# Patient Record
Sex: FEMALE | Race: WHITE | Employment: OTHER | URBAN - METROPOLITAN AREA
[De-identification: names, ages, dates, MRNs, and addresses within clinical notes are randomized per-mention and may not be internally consistent; named-entity substitution may affect disease eponyms.]

---

## 2018-10-26 ENCOUNTER — APPOINTMENT (OUTPATIENT)
Dept: GENERAL RADIOLOGY | Age: 73
DRG: 439 | End: 2018-10-26
Attending: EMERGENCY MEDICINE
Payer: MEDICARE

## 2018-10-26 ENCOUNTER — APPOINTMENT (OUTPATIENT)
Dept: ULTRASOUND IMAGING | Age: 73
DRG: 439 | End: 2018-10-26
Attending: EMERGENCY MEDICINE
Payer: MEDICARE

## 2018-10-26 ENCOUNTER — APPOINTMENT (OUTPATIENT)
Dept: CT IMAGING | Age: 73
DRG: 439 | End: 2018-10-26
Attending: EMERGENCY MEDICINE
Payer: MEDICARE

## 2018-10-26 ENCOUNTER — HOSPITAL ENCOUNTER (INPATIENT)
Age: 73
LOS: 8 days | Discharge: HOME | DRG: 439 | End: 2018-11-03
Attending: EMERGENCY MEDICINE | Admitting: INTERNAL MEDICINE
Payer: MEDICARE

## 2018-10-26 DIAGNOSIS — K85.11 ACUTE BILIARY PANCREATITIS WITH UNINFECTED NECROSIS: ICD-10-CM

## 2018-10-26 DIAGNOSIS — K80.20 GALL STONES: ICD-10-CM

## 2018-10-26 DIAGNOSIS — K85.90 ACUTE PANCREATITIS, UNSPECIFIED COMPLICATION STATUS, UNSPECIFIED PANCREATITIS TYPE: Primary | ICD-10-CM

## 2018-10-26 LAB
ALBUMIN SERPL-MCNC: 3.8 G/DL (ref 3.5–5)
ALBUMIN/GLOB SERPL: 0.9 {RATIO} (ref 1.1–2.2)
ALP SERPL-CCNC: 132 U/L (ref 45–117)
ALT SERPL-CCNC: 63 U/L (ref 12–78)
ANION GAP SERPL CALC-SCNC: 7 MMOL/L (ref 5–15)
APPEARANCE UR: CLEAR
AST SERPL-CCNC: 100 U/L (ref 15–37)
ATRIAL RATE: 77 BPM
BACTERIA URNS QL MICRO: NEGATIVE /HPF
BASOPHILS # BLD: 0.1 K/UL (ref 0–0.1)
BASOPHILS NFR BLD: 0 % (ref 0–1)
BILIRUB SERPL-MCNC: 0.9 MG/DL (ref 0.2–1)
BILIRUB UR QL: NEGATIVE
BUN SERPL-MCNC: 22 MG/DL (ref 6–20)
BUN/CREAT SERPL: 30 (ref 12–20)
CALCIUM SERPL-MCNC: 9 MG/DL (ref 8.5–10.1)
CALCULATED P AXIS, ECG09: 34 DEGREES
CALCULATED R AXIS, ECG10: -27 DEGREES
CALCULATED T AXIS, ECG11: 9 DEGREES
CHLORIDE SERPL-SCNC: 105 MMOL/L (ref 97–108)
CO2 SERPL-SCNC: 26 MMOL/L (ref 21–32)
COLOR UR: ABNORMAL
CREAT SERPL-MCNC: 0.74 MG/DL (ref 0.55–1.02)
DIAGNOSIS, 93000: NORMAL
DIFFERENTIAL METHOD BLD: ABNORMAL
EOSINOPHIL # BLD: 0.1 K/UL (ref 0–0.4)
EOSINOPHIL NFR BLD: 0 % (ref 0–7)
EPITH CASTS URNS QL MICRO: ABNORMAL /LPF
ERYTHROCYTE [DISTWIDTH] IN BLOOD BY AUTOMATED COUNT: 13.1 % (ref 11.5–14.5)
GLOBULIN SER CALC-MCNC: 4.4 G/DL (ref 2–4)
GLUCOSE BLD STRIP.AUTO-MCNC: 210 MG/DL (ref 65–100)
GLUCOSE BLD STRIP.AUTO-MCNC: 217 MG/DL (ref 65–100)
GLUCOSE SERPL-MCNC: 185 MG/DL (ref 65–100)
GLUCOSE UR STRIP.AUTO-MCNC: NEGATIVE MG/DL
HCT VFR BLD AUTO: 48.8 % (ref 35–47)
HGB BLD-MCNC: 15.5 G/DL (ref 11.5–16)
HGB UR QL STRIP: NEGATIVE
HYALINE CASTS URNS QL MICRO: ABNORMAL /LPF (ref 0–5)
IMM GRANULOCYTES # BLD: 0.1 K/UL (ref 0–0.04)
IMM GRANULOCYTES NFR BLD AUTO: 1 % (ref 0–0.5)
KETONES UR QL STRIP.AUTO: NEGATIVE MG/DL
LACTATE SERPL-SCNC: 2.4 MMOL/L (ref 0.4–2)
LEUKOCYTE ESTERASE UR QL STRIP.AUTO: ABNORMAL
LIPASE SERPL-CCNC: >3000 U/L (ref 73–393)
LYMPHOCYTES # BLD: 2.8 K/UL (ref 0.8–3.5)
LYMPHOCYTES NFR BLD: 12 % (ref 12–49)
MCH RBC QN AUTO: 30.6 PG (ref 26–34)
MCHC RBC AUTO-ENTMCNC: 31.8 G/DL (ref 30–36.5)
MCV RBC AUTO: 96.4 FL (ref 80–99)
MONOCYTES # BLD: 1.7 K/UL (ref 0–1)
MONOCYTES NFR BLD: 7 % (ref 5–13)
NEUTS SEG # BLD: 18.8 K/UL (ref 1.8–8)
NEUTS SEG NFR BLD: 80 % (ref 32–75)
NITRITE UR QL STRIP.AUTO: NEGATIVE
NRBC # BLD: 0 K/UL (ref 0–0.01)
NRBC BLD-RTO: 0 PER 100 WBC
P-R INTERVAL, ECG05: 140 MS
PH UR STRIP: 5.5 [PH] (ref 5–8)
PLATELET # BLD AUTO: 449 K/UL (ref 150–400)
PMV BLD AUTO: 10.2 FL (ref 8.9–12.9)
POTASSIUM SERPL-SCNC: 4.2 MMOL/L (ref 3.5–5.1)
PROT SERPL-MCNC: 8.2 G/DL (ref 6.4–8.2)
PROT UR STRIP-MCNC: NEGATIVE MG/DL
Q-T INTERVAL, ECG07: 396 MS
QRS DURATION, ECG06: 66 MS
QTC CALCULATION (BEZET), ECG08: 448 MS
RBC # BLD AUTO: 5.06 M/UL (ref 3.8–5.2)
RBC #/AREA URNS HPF: ABNORMAL /HPF (ref 0–5)
SERVICE CMNT-IMP: ABNORMAL
SERVICE CMNT-IMP: ABNORMAL
SODIUM SERPL-SCNC: 138 MMOL/L (ref 136–145)
SP GR UR REFRACTOMETRY: 1.01 (ref 1–1.03)
TRIGL SERPL-MCNC: 101 MG/DL (ref ?–150)
TROPONIN I SERPL-MCNC: <0.05 NG/ML
UA: UC IF INDICATED,UAUC: ABNORMAL
UROBILINOGEN UR QL STRIP.AUTO: 0.2 EU/DL (ref 0.2–1)
VENTRICULAR RATE, ECG03: 77 BPM
WBC # BLD AUTO: 23.6 K/UL (ref 3.6–11)
WBC URNS QL MICRO: ABNORMAL /HPF (ref 0–4)

## 2018-10-26 PROCEDURE — 83690 ASSAY OF LIPASE: CPT | Performed by: EMERGENCY MEDICINE

## 2018-10-26 PROCEDURE — 84484 ASSAY OF TROPONIN QUANT: CPT | Performed by: EMERGENCY MEDICINE

## 2018-10-26 PROCEDURE — 81001 URINALYSIS AUTO W/SCOPE: CPT | Performed by: INTERNAL MEDICINE

## 2018-10-26 PROCEDURE — 96374 THER/PROPH/DIAG INJ IV PUSH: CPT

## 2018-10-26 PROCEDURE — 74011250636 HC RX REV CODE- 250/636: Performed by: EMERGENCY MEDICINE

## 2018-10-26 PROCEDURE — 99285 EMERGENCY DEPT VISIT HI MDM: CPT

## 2018-10-26 PROCEDURE — 84478 ASSAY OF TRIGLYCERIDES: CPT | Performed by: PHYSICIAN ASSISTANT

## 2018-10-26 PROCEDURE — 71045 X-RAY EXAM CHEST 1 VIEW: CPT

## 2018-10-26 PROCEDURE — 74011636637 HC RX REV CODE- 636/637: Performed by: INTERNAL MEDICINE

## 2018-10-26 PROCEDURE — 74011250636 HC RX REV CODE- 250/636: Performed by: PHYSICIAN ASSISTANT

## 2018-10-26 PROCEDURE — 74011000250 HC RX REV CODE- 250: Performed by: EMERGENCY MEDICINE

## 2018-10-26 PROCEDURE — 74011250636 HC RX REV CODE- 250/636: Performed by: FAMILY MEDICINE

## 2018-10-26 PROCEDURE — 87086 URINE CULTURE/COLONY COUNT: CPT | Performed by: INTERNAL MEDICINE

## 2018-10-26 PROCEDURE — 99222 1ST HOSP IP/OBS MODERATE 55: CPT | Performed by: FAMILY MEDICINE

## 2018-10-26 PROCEDURE — 93005 ELECTROCARDIOGRAM TRACING: CPT

## 2018-10-26 PROCEDURE — 36415 COLL VENOUS BLD VENIPUNCTURE: CPT | Performed by: EMERGENCY MEDICINE

## 2018-10-26 PROCEDURE — 76705 ECHO EXAM OF ABDOMEN: CPT

## 2018-10-26 PROCEDURE — 74011000258 HC RX REV CODE- 258: Performed by: FAMILY MEDICINE

## 2018-10-26 PROCEDURE — 74177 CT ABD & PELVIS W/CONTRAST: CPT

## 2018-10-26 PROCEDURE — 74011000258 HC RX REV CODE- 258: Performed by: INTERNAL MEDICINE

## 2018-10-26 PROCEDURE — 74011250636 HC RX REV CODE- 250/636: Performed by: INTERNAL MEDICINE

## 2018-10-26 PROCEDURE — 82962 GLUCOSE BLOOD TEST: CPT

## 2018-10-26 PROCEDURE — 83605 ASSAY OF LACTIC ACID: CPT | Performed by: EMERGENCY MEDICINE

## 2018-10-26 PROCEDURE — 74011000258 HC RX REV CODE- 258: Performed by: EMERGENCY MEDICINE

## 2018-10-26 PROCEDURE — 87040 BLOOD CULTURE FOR BACTERIA: CPT | Performed by: EMERGENCY MEDICINE

## 2018-10-26 PROCEDURE — 65270000029 HC RM PRIVATE

## 2018-10-26 PROCEDURE — 74011250637 HC RX REV CODE- 250/637: Performed by: EMERGENCY MEDICINE

## 2018-10-26 PROCEDURE — 85025 COMPLETE CBC W/AUTO DIFF WBC: CPT | Performed by: EMERGENCY MEDICINE

## 2018-10-26 PROCEDURE — 82787 IGG 1 2 3 OR 4 EACH: CPT | Performed by: PHYSICIAN ASSISTANT

## 2018-10-26 PROCEDURE — 74011250636 HC RX REV CODE- 250/636

## 2018-10-26 PROCEDURE — 80053 COMPREHEN METABOLIC PANEL: CPT | Performed by: EMERGENCY MEDICINE

## 2018-10-26 PROCEDURE — 96375 TX/PRO/DX INJ NEW DRUG ADDON: CPT

## 2018-10-26 PROCEDURE — 74011636320 HC RX REV CODE- 636/320: Performed by: EMERGENCY MEDICINE

## 2018-10-26 RX ORDER — ONDANSETRON 2 MG/ML
INJECTION INTRAMUSCULAR; INTRAVENOUS
Status: COMPLETED
Start: 2018-10-26 | End: 2018-10-26

## 2018-10-26 RX ORDER — SODIUM CHLORIDE, SODIUM LACTATE, POTASSIUM CHLORIDE, CALCIUM CHLORIDE 600; 310; 30; 20 MG/100ML; MG/100ML; MG/100ML; MG/100ML
150 INJECTION, SOLUTION INTRAVENOUS CONTINUOUS
Status: DISCONTINUED | OUTPATIENT
Start: 2018-10-26 | End: 2018-10-29

## 2018-10-26 RX ORDER — HYDROMORPHONE HYDROCHLORIDE 1 MG/ML
0.5 INJECTION, SOLUTION INTRAMUSCULAR; INTRAVENOUS; SUBCUTANEOUS
Status: DISCONTINUED | OUTPATIENT
Start: 2018-10-26 | End: 2018-11-03 | Stop reason: HOSPADM

## 2018-10-26 RX ORDER — DEXTROSE 50 % IN WATER (D50W) INTRAVENOUS SYRINGE
25-50 AS NEEDED
Status: DISCONTINUED | OUTPATIENT
Start: 2018-10-26 | End: 2018-11-03 | Stop reason: HOSPADM

## 2018-10-26 RX ORDER — ASPIRIN 81 MG/1
81 TABLET ORAL DAILY
COMMUNITY
End: 2020-06-12

## 2018-10-26 RX ORDER — MAGNESIUM SULFATE 100 %
4 CRYSTALS MISCELLANEOUS AS NEEDED
Status: DISCONTINUED | OUTPATIENT
Start: 2018-10-26 | End: 2018-11-03 | Stop reason: HOSPADM

## 2018-10-26 RX ORDER — SODIUM CHLORIDE 0.9 % (FLUSH) 0.9 %
5-10 SYRINGE (ML) INJECTION EVERY 8 HOURS
Status: DISCONTINUED | OUTPATIENT
Start: 2018-10-26 | End: 2018-11-03 | Stop reason: HOSPADM

## 2018-10-26 RX ORDER — SODIUM CHLORIDE 9 MG/ML
100 INJECTION, SOLUTION INTRAVENOUS CONTINUOUS
Status: DISCONTINUED | OUTPATIENT
Start: 2018-10-26 | End: 2018-10-26

## 2018-10-26 RX ORDER — HYDROMORPHONE HYDROCHLORIDE 1 MG/ML
0.5 INJECTION, SOLUTION INTRAMUSCULAR; INTRAVENOUS; SUBCUTANEOUS ONCE
Status: COMPLETED | OUTPATIENT
Start: 2018-10-26 | End: 2018-10-26

## 2018-10-26 RX ORDER — SODIUM CHLORIDE 0.9 % (FLUSH) 0.9 %
10 SYRINGE (ML) INJECTION
Status: COMPLETED | OUTPATIENT
Start: 2018-10-26 | End: 2018-10-26

## 2018-10-26 RX ORDER — FAMOTIDINE 10 MG/ML
20 INJECTION INTRAVENOUS
Status: COMPLETED | OUTPATIENT
Start: 2018-10-26 | End: 2018-10-26

## 2018-10-26 RX ORDER — ONDANSETRON 2 MG/ML
4 INJECTION INTRAMUSCULAR; INTRAVENOUS
Status: DISCONTINUED | OUTPATIENT
Start: 2018-10-26 | End: 2018-11-03 | Stop reason: HOSPADM

## 2018-10-26 RX ORDER — SODIUM CHLORIDE 9 MG/ML
50 INJECTION, SOLUTION INTRAVENOUS
Status: COMPLETED | OUTPATIENT
Start: 2018-10-26 | End: 2018-10-26

## 2018-10-26 RX ORDER — INSULIN LISPRO 100 [IU]/ML
INJECTION, SOLUTION INTRAVENOUS; SUBCUTANEOUS
Status: DISCONTINUED | OUTPATIENT
Start: 2018-10-26 | End: 2018-11-03 | Stop reason: HOSPADM

## 2018-10-26 RX ORDER — SODIUM CHLORIDE 9 MG/ML
100 INJECTION, SOLUTION INTRAVENOUS ONCE
Status: COMPLETED | OUTPATIENT
Start: 2018-10-26 | End: 2018-10-26

## 2018-10-26 RX ORDER — MORPHINE SULFATE 2 MG/ML
2 INJECTION, SOLUTION INTRAMUSCULAR; INTRAVENOUS ONCE
Status: COMPLETED | OUTPATIENT
Start: 2018-10-26 | End: 2018-10-26

## 2018-10-26 RX ORDER — MULTIVITAMIN WITH IRON
1 TABLET ORAL DAILY
COMMUNITY
End: 2020-06-02

## 2018-10-26 RX ORDER — ONDANSETRON 2 MG/ML
8 INJECTION INTRAMUSCULAR; INTRAVENOUS
Status: COMPLETED | OUTPATIENT
Start: 2018-10-26 | End: 2018-10-26

## 2018-10-26 RX ORDER — HEPARIN SODIUM 5000 [USP'U]/ML
5000 INJECTION, SOLUTION INTRAVENOUS; SUBCUTANEOUS EVERY 8 HOURS
Status: DISCONTINUED | OUTPATIENT
Start: 2018-10-26 | End: 2018-11-03 | Stop reason: HOSPADM

## 2018-10-26 RX ORDER — ONDANSETRON 2 MG/ML
6 INJECTION INTRAMUSCULAR; INTRAVENOUS
Status: COMPLETED | OUTPATIENT
Start: 2018-10-26 | End: 2018-10-26

## 2018-10-26 RX ORDER — SODIUM CHLORIDE 0.9 % (FLUSH) 0.9 %
5-10 SYRINGE (ML) INJECTION AS NEEDED
Status: DISCONTINUED | OUTPATIENT
Start: 2018-10-26 | End: 2018-11-03 | Stop reason: HOSPADM

## 2018-10-26 RX ADMIN — HEPARIN SODIUM 5000 UNITS: 5000 INJECTION INTRAVENOUS; SUBCUTANEOUS at 21:07

## 2018-10-26 RX ADMIN — LIDOCAINE HYDROCHLORIDE 40 ML: 20 SOLUTION ORAL; TOPICAL at 13:11

## 2018-10-26 RX ADMIN — Medication 10 ML: at 15:11

## 2018-10-26 RX ADMIN — INSULIN LISPRO 2 UNITS: 100 INJECTION, SOLUTION INTRAVENOUS; SUBCUTANEOUS at 21:07

## 2018-10-26 RX ADMIN — Medication 10 ML: at 17:55

## 2018-10-26 RX ADMIN — MORPHINE SULFATE 2 MG: 2 INJECTION, SOLUTION INTRAMUSCULAR; INTRAVENOUS at 13:35

## 2018-10-26 RX ADMIN — SODIUM CHLORIDE, SODIUM LACTATE, POTASSIUM CHLORIDE, AND CALCIUM CHLORIDE 250 ML/HR: 600; 310; 30; 20 INJECTION, SOLUTION INTRAVENOUS at 23:43

## 2018-10-26 RX ADMIN — FAMOTIDINE 20 MG: 10 INJECTION, SOLUTION INTRAVENOUS at 13:35

## 2018-10-26 RX ADMIN — SODIUM CHLORIDE, SODIUM LACTATE, POTASSIUM CHLORIDE, AND CALCIUM CHLORIDE 250 ML/HR: 600; 310; 30; 20 INJECTION, SOLUTION INTRAVENOUS at 19:03

## 2018-10-26 RX ADMIN — ONDANSETRON 6 MG: 2 INJECTION INTRAMUSCULAR; INTRAVENOUS at 13:35

## 2018-10-26 RX ADMIN — HYDROMORPHONE HYDROCHLORIDE 0.5 MG: 1 INJECTION, SOLUTION INTRAMUSCULAR; INTRAVENOUS; SUBCUTANEOUS at 21:20

## 2018-10-26 RX ADMIN — PROMETHAZINE HYDROCHLORIDE 12.5 MG: 25 INJECTION INTRAMUSCULAR; INTRAVENOUS at 19:03

## 2018-10-26 RX ADMIN — IOPAMIDOL 100 ML: 755 INJECTION, SOLUTION INTRAVENOUS at 14:25

## 2018-10-26 RX ADMIN — INSULIN LISPRO 3 UNITS: 100 INJECTION, SOLUTION INTRAVENOUS; SUBCUTANEOUS at 17:59

## 2018-10-26 RX ADMIN — PIPERACILLIN SODIUM,TAZOBACTAM SODIUM 4.5 G: 4; .5 INJECTION, POWDER, FOR SOLUTION INTRAVENOUS at 17:11

## 2018-10-26 RX ADMIN — SODIUM CHLORIDE 1000 ML: 900 INJECTION, SOLUTION INTRAVENOUS at 14:23

## 2018-10-26 RX ADMIN — MEROPENEM 1 G: 1 INJECTION, POWDER, FOR SOLUTION INTRAVENOUS at 21:04

## 2018-10-26 RX ADMIN — HYDROMORPHONE HYDROCHLORIDE 0.5 MG: 1 INJECTION, SOLUTION INTRAMUSCULAR; INTRAVENOUS; SUBCUTANEOUS at 15:20

## 2018-10-26 RX ADMIN — ONDANSETRON 8 MG: 2 INJECTION INTRAMUSCULAR; INTRAVENOUS at 16:58

## 2018-10-26 RX ADMIN — SODIUM CHLORIDE 50 ML/HR: 900 INJECTION, SOLUTION INTRAVENOUS at 15:11

## 2018-10-26 RX ADMIN — Medication 10 ML: at 21:05

## 2018-10-26 RX ADMIN — SODIUM CHLORIDE, SODIUM LACTATE, POTASSIUM CHLORIDE, AND CALCIUM CHLORIDE 1000 ML: 600; 310; 30; 20 INJECTION, SOLUTION INTRAVENOUS at 17:55

## 2018-10-26 RX ADMIN — SODIUM CHLORIDE 100 ML/HR: 900 INJECTION, SOLUTION INTRAVENOUS at 17:11

## 2018-10-26 RX ADMIN — HYDROMORPHONE HYDROCHLORIDE 0.5 MG: 1 INJECTION, SOLUTION INTRAMUSCULAR; INTRAVENOUS; SUBCUTANEOUS at 17:50

## 2018-10-26 NOTE — ED NOTES
Patient assisted to bedside commode at this time; urine sample obtained and sent to lab at this time. Patient returned to bed in a position of comfort; call bell within reach.

## 2018-10-26 NOTE — ED NOTES
TRANSFER - OUT REPORT: 
 
Verbal report given to Regla Sidhu RN(name) on Karissa Payan  being transferred to Gen Surg(unit) for routine progression of care Report consisted of patients Situation, Background, Assessment and  
Recommendations(SBAR). Information from the following report(s) SBAR, Kardex, ED Summary, MAR, Recent Results and Cardiac Rhythm NSR was reviewed with the receiving nurse. Lines:  
Peripheral IV 10/26/18 Right Hand (Active) Site Assessment Clean, dry, & intact 10/26/2018  1:31 PM  
Phlebitis Assessment 0 10/26/2018  1:31 PM  
Infiltration Assessment 0 10/26/2018  1:31 PM  
Dressing Status Clean, dry, & intact 10/26/2018  1:31 PM  
Dressing Type Transparent 10/26/2018  1:31 PM  
Hub Color/Line Status Blue;Capped;Flushed 10/26/2018  1:31 PM  
  
 
Opportunity for questions and clarification was provided. Patient transported with: 
 Tech and patient chart

## 2018-10-26 NOTE — ROUTINE PROCESS
TRANSFER - IN REPORT: 
 
Verbal report received from Jeremy Ferro, [facility] (name) on Cielo Dan  being received from ED (unit) for routine progression of care Report consisted of patients Situation, Background, Assessment and  
Recommendations(SBAR). Information from the following report(s) SBAR, Kardex, Intake/Output and MAR was reviewed with the receiving nurse. Opportunity for questions and clarification was provided. Assessment completed upon patients arrival to unit and care assumed.

## 2018-10-26 NOTE — CONSULTS
Gastroenterology Consult     Referring Physician: Dr. Ash Alexandre    Consult Date: 10/26/2018     Subjective:     Chief Complaint: abd pain, nausea, vomiting    History of Present Illness: Yamil Woods is a 68 y.o. female who is seen in consultation for acute pancreatitis. Patient developed upper abd pain at 10am about an hour after breakfast this am (eggs and toast). The pain is severe, 10/10 and constant. There is no radiation to the back. There has been nausea and numerous episodes of vomiting (non bloody). Lipase>3000. CT abd/pelvis with contrast showed:   IMPRESSION:  1. Acute pancreatitis. Possible developing necrosis in the tail. No drainable  fluid collection at this time. 2. Cholelithiasis. Decreased gallstones since 2010. CBD is not dilated. 3. Colonic diverticulosis. No diverticulitis. U/S Abd showed  FINDINGS:      Liver: echogenicity is within normal limits. No focal liver lesion.      Main portal vein flow: Toward the liver.     Fluid: No ascites.     Gallbladder: Contains a mobile shadowing gallstones. One of the gallstones  measures 2.4 cm and is lodged in the gallbladder neck. No gallbladder wall  thickening or pericholecystic fluid. Positive tenderness during scanning.     Bile ducts: There is no intra or extrahepatic biliary ductal dilatation. The  common bile duct measures 4 mm.     Pancreas: 2 mm pancreatic duct is within normal limits. No visible pancreatic  Fluid.      Kidneys: Right length: 10.8 cm. No hydronephrosis.     IMPRESSION  IMPRESSION:   1. Cholelithiasis. No acute cholecystitis. Normal CBD. 2.  Normal pancreas on ultrasound. T. Bili 0.9  Alk Phos 132    ALT 63    WBC 23.6  BUN/Crt normal  Hct 48    No hx of pancreatitis or sxs of gallstones. No ETOH. No new medications. Mother had her gallbladder removed. No FH of pancreatitis. Non smoker. Did intentionally lose 23 lbs with the help of weight watchers this year (since Jan).     Past Medical History:   Diagnosis Date    Arthritis     osteoarthritis    Chronic allergic rhinitis     Chronic pain     right knee    Diabetes (HCC)     diet controlled    GERD (gastroesophageal reflux disease)     Hypercholesterolemia     Hypertension      Past Surgical History:   Procedure Laterality Date    HX ORTHOPAEDIC  9/18/13    RIGHT TOTAL KNEE ARTHROPLASTY    HX AISSATOU AND BSO      HX TUBAL LIGATION        History reviewed. No pertinent family history. Social History     Tobacco Use    Smoking status: Never Smoker    Smokeless tobacco: Never Used   Substance Use Topics    Alcohol use: No      No Known Allergies  Current Facility-Administered Medications   Medication Dose Route Frequency    0.9% sodium chloride infusion  100 mL/hr IntraVENous ONCE    piperacillin-tazobactam (ZOSYN) 3.375 g in 0.9% sodium chloride (MBP/ADV) 100 mL  3.375 g IntraVENous Q8H    ondansetron (ZOFRAN) injection 8 mg  8 mg IntraVENous NOW    glucose chewable tablet 16 g  4 Tab Oral PRN    dextrose (D50W) injection syrg 12.5-25 g  25-50 mL IntraVENous PRN    glucagon (GLUCAGEN) injection 1 mg  1 mg IntraMUSCular PRN    insulin lispro (HUMALOG) injection   SubCUTAneous AC&HS    sodium chloride (NS) flush 5-10 mL  5-10 mL IntraVENous Q8H    sodium chloride (NS) flush 5-10 mL  5-10 mL IntraVENous PRN    0.9% sodium chloride infusion  100 mL/hr IntraVENous CONTINUOUS    HYDROmorphone (PF) (DILAUDID) injection 0.5 mg  0.5 mg IntraVENous Q4H PRN    heparin (porcine) injection 5,000 Units  5,000 Units SubCUTAneous Q8H    ondansetron (ZOFRAN) injection 4 mg  4 mg IntraVENous Q4H PRN    acetaminophen (TYLENOL) solution 650 mg  650 mg Oral Q6H PRN     Current Outpatient Medications   Medication Sig    aspirin delayed-release 81 mg tablet Take 81 mg by mouth daily.  multivitamin with iron (DAILY MULTI-VITAMINS/IRON) tablet Take 1 Tab by mouth daily.  amLODIPine (NORVASC) 5 mg tablet Take 5 mg by mouth daily.  Indications: HYPERTENSION    omeprazole (PRILOSEC) 20 mg capsule Take 20 mg by mouth daily. Indications: GASTROESOPHAGEAL REFLUX    potassium chloride SR (KLOR-CON 10) 10 mEq tablet Take 20 mEq by mouth two (2) times a day.  lisinopril (PRINIVIL, ZESTRIL) 40 mg tablet Take 40 mg by mouth daily. Indications: HYPERTENSION    atorvastatin (LIPITOR) 20 mg tablet Take 20 mg by mouth nightly. Indications: HYPERCHOLESTEROLEMIA    BUMETANIDE (BUMEX PO) Take 1 mg by mouth daily.  estradiol (ESTRACE) 0.5 mg tablet Take 0.5 mg by mouth every other day.  cycloSPORINE (RESTASIS) 0.05 % ophthalmic emulsion Administer 1 Drop to left eye two (2) times a day. Indications: DRY EYE        Review of Systems:  A detailed 10 organ review of systems is obtained with pertinent positives as listed in the History of Present Illness and Past Medical History. A detailed review of systems was performed as follows:  Constitutional:  Sweats/chills  Eyes:  No ocular sensitivity to the sun, blurred vision or double vision. ENMT:  No nose or sinus problems. Respiratory: No coughing, wheezing or sob  Cardiac:  No chest pain, exertional chest pain or palpitations  Gastrointestinal:  See history of the present illness  :   No pain with urination or hematuria  Musculoskeletal:  No arthritis or hot swollen joints. Endocrine:  No thyroid disease or diabetes  Psychiatric: No depression or feeling blue  Integumentary:  No skin rash or sensitivity to the sun. Neurologic:  No stroke or seizure; no numbness or tingling of the extremities. Heme-Lymphatic:  No history of anemia, no unexplained lumps or bumps      Objective:     Physical Exam:  Visit Vitals  /57   Pulse 64   Temp 97.3 °F (36.3 °C)   Resp 28   Ht 5' 2\" (1.575 m)   Wt 64.8 kg (142 lb 13.7 oz)   SpO2 93%   BMI 26.13 kg/m²        Gen: Ill appearing but NAD  Skin:  Extremities and face reveal no rashes. Judson Pérez HEENT: Sclerae anicteric. No abnormal pigmentation of the lips. Cardiovascular: Regular rate and rhythm. No murmurs, gallops, or rubs. Respiratory:  Comfortable breathing with no accessory muscle use. Clear breath sounds with no wheezes, rales, or rhonchi. GI:  Abdomen nondistended Normal active bowel sounds. Tenderness across epigastrium. Palpation in RUQ induces referred pain in LUQ. No enlargement of the liver or spleen. No masses palpable. Rectal:  Deferred  Musculoskeletal:  No pitting edema of the lower legs. Neurological:  Gross memory appears intact. Patient is alert and oriented. Psychiatric:  Mood appears appropriate with judgement intact. Lymphatic:  No cervical or supraclavicular adenopathy. Lab/Data Review:  CT Results (most recent):  Results from Hospital Encounter encounter on 10/26/18   CT ABD PELV W CONT    Narrative EXAM:  CT ABD PELV W CONT    INDICATION: Epigastric abdominal pain, leukocytosis, elevated lipase, alkaline  phosphatase, and AST. Cholelithiasis. Hysterectomy and oophorectomy. COMPARISON: Limited abdominal ultrasound earlier this afternoon. CT  abdomen/pelvis without IV contrast on 1/4/2010. CONTRAST:  100 mL of Isovue-370. TECHNIQUE:   Following the uneventful intravenous administration of contrast, thin axial  images were obtained through the abdomen and pelvis. Coronal and sagittal  reconstructions were generated. Oral contrast was not administered. CT dose  reduction was achieved through use of a standardized protocol tailored for this  examination and automatic exposure control for dose modulation. FINDINGS:   LUNG BASES: Mild dependent atelectasis. No pneumonia. INCIDENTALLY IMAGED HEART AND MEDIASTINUM: Unremarkable. LIVER: No mass or biliary dilatation. GALLBLADDER: Cholelithiasis is decreased in number since 2010. No acute  cholecystitis. CBD is not dilated. SPLEEN: Normal size and enhancement. PANCREAS: Hypoenhancement of the tail. No mass. Extensive surrounding edema.  No  drainable fluid collection or pseudocyst. Pancreatic duct is not dilated. ADRENALS: Unremarkable. KIDNEYS: No mass, calculus, or hydronephrosis. STOMACH: Unremarkable. SMALL BOWEL: No dilatation or wall thickening. COLON: Moderate colonic diverticulosis. No diverticulitis. APPENDIX: Unremarkable. PERITONEUM: No ascites or pneumoperitoneum. RETROPERITONEUM: Edema from pancreatitis. Aorta is normal in caliber. Aortic  branches are patent. No lymphadenopathy. Mesenteric vasculature is patent. REPRODUCTIVE ORGANS: Hysterectomy and oophorectomy. URINARY BLADDER: No mass or calculus. BONES: No destructive bone lesion. ADDITIONAL COMMENTS: N/A      Impression IMPRESSION:    1. Acute pancreatitis. Possible developing necrosis in the tail. No drainable  fluid collection at this time. 2. Cholelithiasis. Decreased gallstones since 2010. CBD is not dilated. 3. Colonic diverticulosis. No diverticulitis. US Results (most recent):  Results from [place] encounter on 10/26/18   US ABD LTD    Narrative EXAM:  US ABD LTD    INDICATION: Sudden onset mid lower abdominal pain at 10:00 AM today. Leukocytosis. Elevated lipase, alkaline phosphatase, and AST. Normal serum  bilirubin. COMPARISON:  None    TECHNIQUE:  Limited abdominal ultrasound. FINDINGS:     Liver: echogenicity is within normal limits. No focal liver lesion. Main portal vein flow: Toward the liver. Fluid: No ascites. Gallbladder: Contains a mobile shadowing gallstones. One of the gallstones  measures 2.4 cm and is lodged in the gallbladder neck. No gallbladder wall  thickening or pericholecystic fluid. Positive tenderness during scanning. Bile ducts: There is no intra or extrahepatic biliary ductal dilatation. The  common bile duct measures 4 mm. Pancreas: 2 mm pancreatic duct is within normal limits. No visible pancreatic  Fluid. Kidneys: Right length: 10.8 cm. No hydronephrosis. Impression IMPRESSION:   1. Cholelithiasis.  No acute cholecystitis. Normal CBD. 2.  Normal pancreas on ultrasound. Lab Results   Component Value Date/Time    WBC 23.6 (H) 10/26/2018 01:10 PM    HGB 15.5 10/26/2018 01:10 PM    HCT 48.8 (H) 10/26/2018 01:10 PM    PLATELET 438 (H) 13/02/8276 01:10 PM    MCV 96.4 10/26/2018 01:10 PM     Lab Results   Component Value Date/Time    Sodium 138 10/26/2018 01:10 PM    Potassium 4.2 10/26/2018 01:10 PM    Chloride 105 10/26/2018 01:10 PM    CO2 26 10/26/2018 01:10 PM    Anion gap 7 10/26/2018 01:10 PM    Glucose 185 (H) 10/26/2018 01:10 PM    BUN 22 (H) 10/26/2018 01:10 PM    Creatinine 0.74 10/26/2018 01:10 PM    BUN/Creatinine ratio 30 (H) 10/26/2018 01:10 PM    GFR est AA >60 10/26/2018 01:10 PM    GFR est non-AA >60 10/26/2018 01:10 PM    Calcium 9.0 10/26/2018 01:10 PM    Bilirubin, total 0.9 10/26/2018 01:10 PM    AST (SGOT) 100 (H) 10/26/2018 01:10 PM    Alk. phosphatase 132 (H) 10/26/2018 01:10 PM    Protein, total 8.2 10/26/2018 01:10 PM    Albumin 3.8 10/26/2018 01:10 PM    Globulin 4.4 (H) 10/26/2018 01:10 PM    A-G Ratio 0.9 (L) 10/26/2018 01:10 PM    ALT (SGPT) 63 10/26/2018 01:10 PM         Assessment/Plan:     Active Problems:    Gall stones (10/26/2018)      Pancreatitis (10/26/2018)         Patient presents with her first episode of acute gallstone pancreatitis. There is no evidence of a CBD stone and therefore it does not appear that ERCP is necessary. General surgery consult has been placed. She appears to have severe pancreatitis with WBC 23.6 and developing necrosis in the tail of the pancreas. I recommend aggressive hydration with LR (4-6 L) and supportive care with anti emetics and pain medication and NPO overnight. I warned the patient and her  that severe pancreatitis can progress to multisystem organ failure and can be life threatening in some people. YANNA Ontiveros  10/26/18  5:04 PM      The patient was seen and examined independently.  Please see above note for details. Physical exam:  General:AAO x 3, vomited during interview.  is at bedside  HEENT:  EOMI,   Chest:  CTA,Heart: S1, S2, RRR  GI: Soft, epigastric and RUQ pain with referred pain to LUQ. Dec bowel sounds  Extremities: No edema    Data Review:  CT reviewed with radiology    Impression:   Severe  acute pancreatitis  Possible early pancreatic necrosis  Leucocytosis  Nausea,vomiting and pain 2ndary to above  Gallstones    Plan and discussion:  · CT review showed ti pancreatic edema with possible early tail necrosis. GB has one large neck  stone. On last CT there were more stones suggesting that she has been passing them. This is likely what may have caused this. LFTs look ok. No CBD pathology. · Treat aggressively with IVF (LR) 3-4 litres today, then 250 cc /hour,  · IV analgesia,  · IV PPI  · IV antiemetics,  · Surgical consult. · NPO for now. Re asses daily. · Follow up CT depending on how this progresses. · Prognosis d/w pt and  in detail. · I have discussed the serious nature of pancreatitis with the family. The discussion included potential infection, respiratory failure, renal failure, ICU stay and multi-organ failure. · Get IgG 4 and TG levels. Signed By: Amina Man.  Yoon Courtney MD    10/26/2018  5:24 PM

## 2018-10-26 NOTE — ED NOTES
Patient assisted to bedside commode at this time. Patient returned to bed in a position of comfort; call bell within reach.

## 2018-10-26 NOTE — CONSULTS
Surgery      CT scan: \"IMPRESSION:     1. Acute pancreatitis. Possible developing necrosis in the tail. No drainable  fluid collection at this time. 2. Cholelithiasis. Decreased gallstones since 2010. CBD is not dilated. I    US:\"Contains a mobile shadowing gallstones. One of the gallstones  measures 2.4 cm and is lodged in the gallbladder neck. No gallbladder wall  thickening or pericholecystic fluid. Lipase>3000  Bili 0.9  SGPT  63  SGOT  100  Alk phos   132    Ca 9.0    WBC 23.6    H/H 15.5/48.8    Pt reports developing pain this AM.    PE tender across upper abdomen, greater to left side    Acute pancreatitis with possible necrosis  Unclear etiology for pancreatitis but may be gallstones. Would not entertain Lap Jessa at least until lipase is normaliized but due to CT scan findings and severity of pancreatitis might opt to wait six weeks. Suyggest repeat CT scan in 3-4 days unless conditiopn worsens. Discussed antibx coverage for acute pancreatitis and only study I am aware of which showed benefit was use of a carbapenem.     Discussed with Hospitalist    Will follow with you    Kumar Neil MD, [facility]

## 2018-10-26 NOTE — ED NOTES
GI MD at bedside to assess patient and discuss plan of care at this time. Per Dr. Lester Blas should consult general surgery if needed stat.

## 2018-10-26 NOTE — H&P
Hospitalist Admission NoteNAME: Raymond Garcia :  1945 MRN:  [id number] Date/Time:  10/26/2018 3:38 PM 
 
Patient PCP: Kenyon Jackson MD 
______________________________________________________________________ Given the patient's current clinical presentation, I have a high level of concern for decompensation if discharged from the emergency department. Complex decision making was performed, which includes reviewing the patient's available past medical records, laboratory results, and x-ray films. My assessment of this patient's clinical condition and my plan of care is as follows. Assessment / Plan: 
 
Acute pancreatitis  POA Leukocytosis POA with SIRS criteria Blood cx/check lactic acid Cont zosyn Gi/surgery to evl Epigastric pain radiating to back, lipase elevated Suspect related gall stones not on any culprit meds Check lipid panel NPO Aggressive IVF 
PRN pain and nausea meds, monitor for side effects with the narcotics Check CT scan /us 1. Acute pancreatitis. Possible developing necrosis in the tail. No drainable 
fluid collection at this time. 2. Cholelithiasis. Decreased gallstones since . CBD is not dilated. 3. Colonic diverticulosis. No diverticulitis. [place] Check a1c HTN POA hold bp meds/bumex/asa Pain control PRN hydralazine HLP- hold meds ? DVT prophylaxis: Heparin Code status: Full code Surrogate Decision Maker:  GI Prophylaxis: pepcic Baseline:ambulatory Subjective: CHIEF COMPLAINT: abdominal pain HISTORY OF PRESENT ILLNESS:    
Mary Ralph is a 68 y.o.  female with PMHx significant for HTN, GERD,pre diabetes, hypercholesterolemia, and arthritis, presents to the er  C/o  new onset, constant, moderate, sharp, non-radiating epigastric and RUQ pain starting at 1000 today alongside associated nausea with vomiting, chills, and diaphoresis. No blood in vomitusThe pt states that she was able to normally eat breakfast this morning and dinner last night. She reports that she still has her gallbladder. She denies being in the proximity of sick individuals recently. She specifically denies experiencing hematemesis, SOB, CP, diarrhea, or fever. No h/o gall bladder issues. We were asked to admit for work up and evaluation of the above problems. Past Medical History:  
Diagnosis Date  Arthritis   
 osteoarthritis  Chronic allergic rhinitis  Chronic pain   
 right knee  Diabetes ([address] diet controlled  GERD (gastroesophageal reflux disease)  Hypercholesterolemia  Hypertension Past Surgical History:  
Procedure Laterality Date  HX ORTHOPAEDIC  9/18/13 RIGHT TOTAL KNEE ARTHROPLASTY  HX AISSATOU AND BSO  HX TUBAL LIGATION Social History Tobacco Use  Smoking status: Never Smoker  Smokeless tobacco: Never Used Substance Use Topics  Alcohol use: No  
  
 
History reviewed. No pertinent family history. No Known Allergies Prior to Admission medications Medication Sig Start Date End Date Taking? Authorizing Provider  
aspirin delayed-release 81 mg tablet Take 81 mg by mouth daily. Yes Other, MD Radha  
multivitamin with iron (DAILY MULTI-VITAMINS/IRON) tablet Take 1 Tab by mouth daily. Yes Other, MD Radha  
amLODIPine (NORVASC) 5 mg tablet Take 5 mg by mouth daily. Indications: HYPERTENSION   Yes Provider, Historical  
omeprazole (PRILOSEC) 20 mg capsule Take 20 mg by mouth daily. Indications: GASTROESOPHAGEAL REFLUX   Yes Provider, Historical  
potassium chloride SR (KLOR-CON 10) 10 mEq tablet Take 20 mEq by mouth two (2) times a day. Yes Provider, Historical  
lisinopril (PRINIVIL, ZESTRIL) 40 mg tablet Take 40 mg by mouth daily. Indications: HYPERTENSION   Yes Provider, Historical  
atorvastatin (LIPITOR) 20 mg tablet Take 20 mg by mouth nightly. Indications: HYPERCHOLESTEROLEMIA   Yes Provider, Historical  
BUMETANIDE (BUMEX PO) Take 1 mg by mouth daily. Yes Provider, Historical  
estradiol (ESTRACE) 0.5 mg tablet Take 0.5 mg by mouth every other day. Provider, Historical  
cycloSPORINE (RESTASIS) 0.05 % ophthalmic emulsion Administer 1 Drop to left eye two (2) times a day. Indications: DRY EYE    Provider, Historical  
 
 
REVIEW OF SYSTEMS:    
I am not able to complete the review of systems because: The patient is intubated and sedated The patient has altered mental status due to his acute medical problems The patient has baseline aphasia from prior stroke(s) The patient has baseline dementia and is not reliable historian The patient is in acute medical distress and unable to provide information Total of 12 systems reviewed as follows:   
   POSITIVE= underlined text  Negative = text not underlined General:  fever, chills, sweats, generalized weakness, weight loss/gain,  
   loss of appetite Eyes:    blurred vision, eye pain, loss of vision, double vision ENT:    rhinorrhea, pharyngitis Respiratory:   cough, sputum production, SOB, KLEIN, wheezing, pleuritic pain  
Cardiology:   chest pain, palpitations, orthopnea, PND, edema, syncope Gastrointestinal:  abdominal pain , N/V, diarrhea, dysphagia, constipation, bleeding rt uq pain Genitourinary:  frequency, urgency, dysuria, hematuria, incontinence no hematemisis Muskuloskeletal :  arthralgia, myalgia, back pain Hematology:  easy bruising, nose or gum bleeding, lymphadenopathy Dermatological: rash, ulceration, pruritis, color change / jaundice Endocrine:   hot flashes or polydipsia Neurological:  headache, dizziness, confusion, focal weakness, paresthesia, Speech difficulties, memory loss, gait difficulty Psychological: Feelings of anxiety, depression, agitation Objective: VITALS:   
Visit Vitals /63 Pulse (!) 52 Temp 97.3 °F (36.3 °C) Resp 21 Ht 5' 2\" (1.575 m) Wt 64.8 kg (142 lb 13.7 oz) SpO2 98% BMI 26.13 kg/m² PHYSICAL EXAM: 
 
General:    Alert, cooperative, no distress, appears stated age. Bit distress dt n/v 
HEENT: Atraumatic, anicteric sclerae, pink conjunctivae No oral ulcers, mucosa moist, throat clear, dentition fair Neck:  Supple, symmetrical,  thyroid: non tender Lungs:   Clear to auscultation bilaterally. No Wheezing or Rhonchi. No rales. Chest wall:  No tenderness  No Accessory muscle use. Heart:   Regular  rhythm,  No  murmur   No edema Abdomen:   Soft, + tender rt uq . Not distended. No rt no guarding,  Bowel sounds normal 
Extremities: No cyanosis. No clubbing,   
  Skin turgor normal, Capillary refill normal, Radial dial pulse 2+ Skin:     Not pale. Not Jaundiced  No rashes Psych:  Good insight. Not depressed. Not anxious or agitated. Neurologic: EOMs intact. No facial asymmetry. No aphasia or slurred speech. Symmetrical strength, Sensation grossly intact. Alert and oriented X 4.  
 
_______________________________________________________________________ Care Plan discussed with: 
  Comments Patient x Family  x   
RN Care Manager Consultant:  x   
_______________________________________________________________________ Expected  Disposition:  
Home with Family x HH/PT/OT/RN   
SNF/LTC   
SAE   
________________________________________________________________________ TOTAL TIME:  90 Minutes Critical Care Provided     Minutes non procedure based Comments  
 x Reviewed previous records  
>50% of visit spent in counseling and coordination of care x Discussion with patient and/or family and questions answered 
  
 
________________________________________________________________________ Signed: Cristian Guillermo MD 
 
Procedures: see electronic medical records for all procedures/Xrays and details which were not copied into this note but were reviewed prior to creation of Plan. LAB DATA REVIEWED:   
Recent Results (from the past 24 hour(s)) EKG, 12 LEAD, INITIAL Collection Time: 10/26/18 12:19 PM  
Result Value Ref Range Ventricular Rate 77 BPM  
 Atrial Rate 77 BPM  
 P-R Interval 140 ms QRS Duration 66 ms  
 Q-T Interval 396 ms QTC Calculation (Bezet) 448 ms Calculated P Axis 34 degrees Calculated R Axis -27 degrees Calculated T Axis 9 degrees Diagnosis Normal sinus rhythm Inferior infarct , age undetermined Cannot rule out Anterior infarct , age undetermined No previous ECGs available CBC WITH AUTOMATED DIFF Collection Time: 10/26/18  1:10 PM  
Result Value Ref Range WBC 23.6 (H) 3.6 - 11.0 K/uL  
 RBC 5.06 3.80 - 5.20 M/uL  
 HGB 15.5 11.5 - 16.0 g/dL HCT 48.8 (H) 35.0 - 47.0 % MCV 96.4 80.0 - 99.0 FL  
 MCH 30.6 26.0 - 34.0 PG  
 MCHC 31.8 30.0 - 36.5 g/dL  
 RDW 13.1 11.5 - 14.5 % PLATELET 742 (H) 309 - 400 K/uL MPV 10.2 8.9 - 12.9 FL  
 NRBC 0.0 0  WBC ABSOLUTE NRBC 0.00 0.00 - 0.01 K/uL NEUTROPHILS 80 (H) 32 - 75 % LYMPHOCYTES 12 12 - 49 % MONOCYTES 7 5 - 13 % EOSINOPHILS 0 0 - 7 % BASOPHILS 0 0 - 1 % IMMATURE GRANULOCYTES 1 (H) 0.0 - 0.5 % ABS. NEUTROPHILS 18.8 (H) 1.8 - 8.0 K/UL  
 ABS. LYMPHOCYTES 2.8 0.8 - 3.5 K/UL  
 ABS. MONOCYTES 1.7 (H) 0.0 - 1.0 K/UL  
 ABS. EOSINOPHILS 0.1 0.0 - 0.4 K/UL  
 ABS. BASOPHILS 0.1 0.0 - 0.1 K/UL  
 ABS. IMM. GRANS. 0.1 (H) 0.00 - 0.04 K/UL  
 DF AUTOMATED METABOLIC PANEL, COMPREHENSIVE Collection Time: 10/26/18  1:10 PM  
Result Value Ref Range Sodium 138 136 - 145 mmol/L Potassium 4.2 3.5 - 5.1 mmol/L Chloride 105 97 - 108 mmol/L  
 CO2 26 21 - 32 mmol/L Anion gap 7 5 - 15 mmol/L Glucose 185 (H) 65 - 100 mg/dL BUN 22 (H) 6 - 20 MG/DL  Creatinine 0.74 0.55 - 1.02 MG/DL  
 BUN/Creatinine ratio 30 (H) 12 - 20    
 GFR est AA >60 >60 ml/min/1.73m2 GFR est non-AA >60 >60 ml/min/1.73m2 Calcium 9.0 8.5 - 10.1 MG/DL Bilirubin, total 0.9 0.2 - 1.0 MG/DL  
 ALT (SGPT) 63 12 - 78 U/L  
 AST (SGOT) 100 (H) 15 - 37 U/L Alk. phosphatase 132 (H) 45 - 117 U/L Protein, total 8.2 6.4 - 8.2 g/dL Albumin 3.8 3.5 - 5.0 g/dL Globulin 4.4 (H) 2.0 - 4.0 g/dL A-G Ratio 0.9 (L) 1.1 - 2.2 LIPASE Collection Time: 10/26/18  1:10 PM  
Result Value Ref Range Lipase >3,000 (H) 73 - 393 U/L  
TROPONIN I Collection Time: 10/26/18  1:10 PM  
Result Value Ref Range Troponin-I, Qt. <0.05 <0.05 ng/mL

## 2018-10-26 NOTE — ED PROVIDER NOTES
EMERGENCY DEPARTMENT HISTORY AND PHYSICAL EXAM 
 
 
Date: 10/26/2018 Patient Name: Risa Barlow History of Presenting Illness Chief Complaint Patient presents with  Abdominal Pain  
  middle lower abdomen onset this morning at 1000am sudden onset. History Provided By: Patient HPI: Risa Barlow, 68 y.o. female with PMHx significant for HTN, GERD, diabetes, hypercholesterolemia, and arthritis, presents ambulatory to the ED with cc of new onset, constant, moderate, sharp, non-radiating epigastric and RUQ pain starting at 1000 today alongside associated nausea with vomiting, chills, and diaphoresis. The pt states that she was able to normally eat breakfast this morning and dinner last night. She reports that she still has her gallbladder. She denies being in the proximity of sick individuals recently. She specifically denies experiencing hematemesis, SOB, CP, diarrhea, or fever. PMHx: HTN, GERD, diabetes, hypercholesterolemia, and arthritis PSHx: hysterectomy, tubal ligation SHx: - EtOH, - tobacco, - illicit drugs There are no other complaints, changes, or physical findings at this time. PCP: Vaishnavi Cleaning MD 
 
Current Facility-Administered Medications Medication Dose Route Frequency Provider Last Rate Last Dose  
 HYDROmorphone (PF) (DILAUDID) injection 0.5 mg  0.5 mg IntraVENous ONCE Anirudh Arauz MD      
 0.9% sodium chloride infusion  50 mL/hr IntraVENous RAD ONCE Anirudh Arauz MD      
 sodium chloride (NS) flush 10 mL  10 mL IntraVENous RAD ONCE Anirudh Arauz MD      
 iopamidol (ISOVUE-370) 76 % injection 100 mL  100 mL IntraVENous RAD ONCE Anirudh Arauz MD      
 
Current Outpatient Medications Medication Sig Dispense Refill  aspirin delayed-release 81 mg tablet Take 81 mg by mouth daily.  multivitamin with iron (DAILY MULTI-VITAMINS/IRON) tablet Take 1 Tab by mouth daily.  amLODIPine (NORVASC) 5 mg tablet Take 5 mg by mouth daily. Indications: HYPERTENSION    
 omeprazole (PRILOSEC) 20 mg capsule Take 20 mg by mouth daily. Indications: GASTROESOPHAGEAL REFLUX  potassium chloride SR (KLOR-CON 10) 10 mEq tablet Take 20 mEq by mouth two (2) times a day.  lisinopril (PRINIVIL, ZESTRIL) 40 mg tablet Take 40 mg by mouth daily. Indications: HYPERTENSION    
 atorvastatin (LIPITOR) 20 mg tablet Take 20 mg by mouth nightly. Indications: HYPERCHOLESTEROLEMIA  BUMETANIDE (BUMEX PO) Take 1 mg by mouth daily.  estradiol (ESTRACE) 0.5 mg tablet Take 0.5 mg by mouth every other day.  cycloSPORINE (RESTASIS) 0.05 % ophthalmic emulsion Administer 1 Drop to left eye two (2) times a day. Indications: DRY EYE Past History Past Medical History: 
Past Medical History:  
Diagnosis Date  Arthritis   
 osteoarthritis  Chronic allergic rhinitis  Chronic pain   
 right knee  Diabetes ([address] diet controlled  GERD (gastroesophageal reflux disease)  Hypercholesterolemia  Hypertension Past Surgical History: 
Past Surgical History:  
Procedure Laterality Date  HX ORTHOPAEDIC  9/18/13 RIGHT TOTAL KNEE ARTHROPLASTY  HX AISSATOU AND BSO  HX TUBAL LIGATION Family History: 
History reviewed. No pertinent family history. Social History: 
Social History Tobacco Use  Smoking status: Never Smoker  Smokeless tobacco: Never Used Substance Use Topics  Alcohol use: No  
 Drug use: No  
 
 
Allergies: 
No Known Allergies Review of Systems Review of Systems Constitutional: Positive for chills and diaphoresis. Negative for activity change, appetite change, fever and unexpected weight change. HENT: Negative for congestion. Eyes: Negative for pain and visual disturbance. Respiratory: Negative for cough and shortness of breath. Cardiovascular: Negative for chest pain. Gastrointestinal: Positive for abdominal pain (epigastric and RUQ pain), nausea and vomiting. Negative for diarrhea.  
     -hematemesis Genitourinary: Negative for dysuria. Musculoskeletal: Negative for back pain. Skin: Negative for rash. Neurological: Negative for headaches. Physical Exam  
Physical Exam  
Constitutional: She is oriented to person, place, and time. She appears well-developed and well-nourished. This is an elderly female in moderate distress due to pain HENT:  
Head: Normocephalic and atraumatic. Mouth/Throat: Oropharynx is clear and moist.  
Eyes: Conjunctivae and EOM are normal. Pupils are equal, round, and reactive to light. Right eye exhibits no discharge. Left eye exhibits no discharge. Neck: Normal range of motion and full passive range of motion without pain. Neck supple. Cardiovascular: Normal rate, regular rhythm, S1 normal, S2 normal, normal heart sounds, intact distal pulses and normal pulses. No murmur heard. Pulmonary/Chest: Effort normal and breath sounds normal. No respiratory distress. She has no wheezes. She has no rales. Abdominal: Soft. Normal appearance and bowel sounds are normal. She exhibits no distension and no mass. There is tenderness in the right upper quadrant and epigastric area. There is rebound and guarding. Musculoskeletal: Normal range of motion. She exhibits no edema. Neurological: She is alert and oriented to person, place, and time. She has normal strength. No cranial nerve deficit. She exhibits normal muscle tone. Skin: Skin is warm, dry and intact. No rash noted. She is not diaphoretic. Psychiatric: She has a normal mood and affect. Her speech is normal and behavior is normal. Judgment and thought content normal.  
Nursing note and vitals reviewed. Diagnostic Study Results Labs - Recent Results (from the past 12 hour(s)) EKG, 12 LEAD, INITIAL Collection Time: 10/26/18 12:19 PM  
Result Value Ref Range Ventricular Rate 77 BPM  
 Atrial Rate 77 BPM  
 P-R Interval 140 ms QRS Duration 66 ms  
 Q-T Interval 396 ms QTC Calculation (Bezet) 448 ms Calculated P Axis 34 degrees Calculated R Axis -27 degrees Calculated T Axis 9 degrees Diagnosis Normal sinus rhythm Inferior infarct , age undetermined Cannot rule out Anterior infarct , age undetermined No previous ECGs available CBC WITH AUTOMATED DIFF Collection Time: 10/26/18  1:10 PM  
Result Value Ref Range WBC 23.6 (H) 3.6 - 11.0 K/uL  
 RBC 5.06 3.80 - 5.20 M/uL  
 HGB 15.5 11.5 - 16.0 g/dL HCT 48.8 (H) 35.0 - 47.0 % MCV 96.4 80.0 - 99.0 FL  
 MCH 30.6 26.0 - 34.0 PG  
 MCHC 31.8 30.0 - 36.5 g/dL  
 RDW 13.1 11.5 - 14.5 % PLATELET 413 (H) 522 - 400 K/uL MPV 10.2 8.9 - 12.9 FL  
 NRBC 0.0 0  WBC ABSOLUTE NRBC 0.00 0.00 - 0.01 K/uL NEUTROPHILS 80 (H) 32 - 75 % LYMPHOCYTES 12 12 - 49 % MONOCYTES 7 5 - 13 % EOSINOPHILS 0 0 - 7 % BASOPHILS 0 0 - 1 % IMMATURE GRANULOCYTES 1 (H) 0.0 - 0.5 % ABS. NEUTROPHILS 18.8 (H) 1.8 - 8.0 K/UL  
 ABS. LYMPHOCYTES 2.8 0.8 - 3.5 K/UL  
 ABS. MONOCYTES 1.7 (H) 0.0 - 1.0 K/UL  
 ABS. EOSINOPHILS 0.1 0.0 - 0.4 K/UL  
 ABS. BASOPHILS 0.1 0.0 - 0.1 K/UL  
 ABS. IMM. GRANS. 0.1 (H) 0.00 - 0.04 K/UL  
 DF AUTOMATED METABOLIC PANEL, COMPREHENSIVE Collection Time: 10/26/18  1:10 PM  
Result Value Ref Range Sodium 138 136 - 145 mmol/L Potassium 4.2 3.5 - 5.1 mmol/L Chloride 105 97 - 108 mmol/L  
 CO2 26 21 - 32 mmol/L Anion gap 7 5 - 15 mmol/L Glucose 185 (H) 65 - 100 mg/dL BUN 22 (H) 6 - 20 MG/DL Creatinine 0.74 0.55 - 1.02 MG/DL  
 BUN/Creatinine ratio 30 (H) 12 - 20 GFR est AA >60 >60 ml/min/1.73m2 GFR est non-AA >60 >60 ml/min/1.73m2 Calcium 9.0 8.5 - 10.1 MG/DL Bilirubin, total 0.9 0.2 - 1.0 MG/DL  
 ALT (SGPT) 63 12 - 78 U/L  
 AST (SGOT) 100 (H) 15 - 37 U/L Alk.  phosphatase 132 (H) 45 - 117 U/L  
 Protein, total 8.2 6.4 - 8.2 g/dL Albumin 3.8 3.5 - 5.0 g/dL Globulin 4.4 (H) 2.0 - 4.0 g/dL A-G Ratio 0.9 (L) 1.1 - 2.2 LIPASE Collection Time: 10/26/18  1:10 PM  
Result Value Ref Range Lipase >3,000 (H) 73 - 393 U/L  
TROPONIN I Collection Time: 10/26/18  1:10 PM  
Result Value Ref Range Troponin-I, Qt. <0.05 <0.05 ng/mL Radiologic Studies - CXR Results  (Last 48 hours) 10/26/18 1407  XR CHEST PORT Final result Impression:  IMPRESSION:  
   
Low lung volumes. No acute process on portable chest. Consider PA and lateral  
chest views when the patient can better tolerate. Narrative:  EXAM:  XR CHEST PORT INDICATION:  Lower mid abdominal pain had sudden onset this morning. COMPARISON: Chest views on 9/20/2013 TECHNIQUE: Semiupright portable chest AP view. FINDINGS: Cardiac monitoring wires overlie the thorax. The cardiomediastinal and  
hilar contours are within normal limits. The pulmonary vasculature is within  
normal limits. Lung volumes are low. No evidence of pneumonia or pneumothorax. The visualized  
bones and upper abdomen are age-appropriate. US ABD LTD (Final result) Result time 10/26/18 14:55:07 Final result by Gerber, Rad Results In (10/26/18 14:52:11) Initial Result:  
Impression:  
 IMPRESSION:  
1. Cholelithiasis. No acute cholecystitis. Normal CBD. 2.  Normal pancreas on ultrasound. Narrative: EXAM:  US ABD LTD INDICATION: Sudden onset mid lower abdominal pain at 10:00 AM today. Leukocytosis. Elevated lipase, alkaline phosphatase, and AST. Normal serum 
bilirubin. COMPARISON:  None TECHNIQUE:  Limited abdominal ultrasound. FINDINGS:  
 
Liver: echogenicity is within normal limits.  No focal liver lesion. Main portal vein flow: Toward the liver. Fluid: No ascites. Gallbladder: Contains a mobile shadowing gallstones. One of the gallstones measures 2.4 cm and is lodged in the gallbladder neck. No gallbladder wall 
thickening or pericholecystic fluid. Positive tenderness during scanning. Bile ducts: There is no intra or extrahepatic biliary ductal dilatation.  The 
common bile duct measures 4 mm. Pancreas: 2 mm pancreatic duct is within normal limits. No visible pancreatic Fluid. Kidneys: Right length: 10.8 cm. No hydronephrosis.  
  
  
  
   
CT ABD PELV W CONT (Final result) Result time 10/26/18 15:33:47 Final result by Gerber, Rad Results In (10/26/18 15:28:52) Initial Result:  
Impression:  
 IMPRESSION: 
 
1. Acute pancreatitis. Possible developing necrosis in the tail. No drainable 
fluid collection at this time. 2. Cholelithiasis. Decreased gallstones since 2010. CBD is not dilated. 3. Colonic diverticulosis. No diverticulitis. Narrative: EXAM:  CT ABD PELV W CONT INDICATION: Epigastric abdominal pain, leukocytosis, elevated lipase, alkaline 
phosphatase, and AST. Cholelithiasis.  Hysterectomy and oophorectomy. COMPARISON: Limited abdominal ultrasound earlier this afternoon. CT 
abdomen/pelvis without IV contrast on 1/4/2010. CONTRAST:  100 mL of Isovue-370. TECHNIQUE:  
Following the uneventful intravenous administration of contrast, thin axial 
images were obtained through the abdomen and pelvis. Coronal and sagittal 
reconstructions were generated. Oral contrast was not administered. CT dose 
reduction was achieved through use of a standardized protocol tailored for this 
examination and automatic exposure control for dose modulation. FINDINGS:  
LUNG BASES: Mild dependent atelectasis. No pneumonia. INCIDENTALLY IMAGED HEART AND MEDIASTINUM: Unremarkable. LIVER: No mass or biliary dilatation. GALLBLADDER: Cholelithiasis is decreased in number since 2010. No acute 
cholecystitis. CBD is not dilated. SPLEEN: Normal size and enhancement. PANCREAS: Hypoenhancement of the tail. No mass. Extensive surrounding edema. No 
drainable fluid collection or pseudocyst. Pancreatic duct is not dilated. ADRENALS: Unremarkable. KIDNEYS: No mass, calculus, or hydronephrosis. STOMACH: Unremarkable. SMALL BOWEL: No dilatation or wall thickening. COLON: Moderate colonic diverticulosis. No diverticulitis. APPENDIX: Unremarkable. PERITONEUM: No ascites or pneumoperitoneum. RETROPERITONEUM: Edema from pancreatitis. Aorta is normal in caliber. Aortic 
branches are patent. No lymphadenopathy. Mesenteric vasculature is patent. REPRODUCTIVE ORGANS: Hysterectomy and oophorectomy. URINARY BLADDER: No mass or calculus. BONES: No destructive bone lesion. ADDITIONAL COMMENTS: N/A Medical Decision Making I am the first provider for this patient. I reviewed the vital signs, available nursing notes, past medical history, past surgical history, family history and social history. Vital Signs-Reviewed the patient's vital signs. Patient Vitals for the past 12 hrs: 
 Temp Pulse Resp BP SpO2  
10/26/18 1414  (!) 54  94/52   
10/26/18 1400  (!) 54 28 93/45 90 % 10/26/18 1347  (!) 55 14 98/48 94 % 10/26/18 1252    93/41 96 % 10/26/18 1208 97.3 °F (36.3 °C) 63 18 116/78 100 % Pulse Oximetry Analysis - 100% on RA Cardiac Monitor:  
Rate: 63 bpm 
Rhythm: Normal Sinus Rhythm EKG interpretation: (Preliminary) (12:19:09) Rhythm: normal sinus rhythm; and regular . Rate (approx.): 77; Axis: normal; MA interval: normal; QRS interval: normal ; ST/T wave: normal; Other findings: normal. 
Written by Bettie [facility], ED Scribe, as dictated by Oumou Arauz MD. Records Reviewed: Nursing Notes and Old Medical Records Provider Notes (Medical Decision Making):  
Elderly female with upper abdominal pain concerning for biliary colic, pancreatitis, gastritis. Currently without evidence of sepsis. ED Course: Initial assessment performed. The patients presenting problems have been discussed, and they are in agreement with the care plan formulated and outlined with them. I have encouraged them to ask questions as they arise throughout their visit. CONSULT NOTE:  
2:40 PM 
Reji Arauz MD spoke with Dr. Javy Gann, Specialty: Hospitalist 
Discussed pt's hx, disposition, and available diagnostic and imaging results. Reviewed care plans. Consultant will evaluate pt for admission. Written by Issa Paige, ED Scribe, as dictated by Reji Arauz MD. 
 
15:00 US notable for gallstones without CBD dilatation. Discussed with IM-hospitalist who request call to GI. Consult page placed for Dr Zulema Hartmann. 
 
16:00 Continue to await GI for further recommendations. Update hospitalist. 
 
Critical Care Time: 0 minutes Disposition: 
Admit 2:18 PM 
Patient is being admitted to the hospital by Dr. Javy Gann. The results of their tests and reasons for their admission have been discussed with them and/or available family. They convey agreement and understanding for the need to be admitted and for their admission diagnosis. Consultation has been made with the inpatient physician specialist for hospitalization. Written by Issa Paige, MALACHI Scribe, as dictated by Reji Arauz MD. 
 
PLAN: 
1. Admitted to the hospital.  
Diagnosis Clinical Impression: 1. Acute pancreatitis, unspecified complication status, unspecified pancreatitis type Attestation: This note is prepared by Issa Paige, acting as Scribe for Reji Arauz MD. Reji Arauz MD: The scribe's documentation has been prepared under my direction and personally reviewed by me in its entirety. I confirm that the note above accurately reflects all work, treatment, procedures, and medical decision making performed by me.

## 2018-10-26 NOTE — ED TRIAGE NOTES
Patient arrives ambulatory with  with a report of abdominal pain and episodes of vomiting after eating breakfast around 10am. Patient states the pain started, then she started getting sick, and sweating. Patient denies diarrhea and fever. Patient reports recent travel outside [address] [facility] to [facility] and just got back Sunday. MD at bedside at this time.

## 2018-10-27 LAB
ALBUMIN SERPL-MCNC: 2.9 G/DL (ref 3.5–5)
ALBUMIN/GLOB SERPL: 0.7 {RATIO} (ref 1.1–2.2)
ALP SERPL-CCNC: 120 U/L (ref 45–117)
ALT SERPL-CCNC: 127 U/L (ref 12–78)
ANION GAP SERPL CALC-SCNC: 8 MMOL/L (ref 5–15)
AST SERPL-CCNC: 81 U/L (ref 15–37)
BASOPHILS # BLD: 0 K/UL (ref 0–0.1)
BASOPHILS NFR BLD: 0 % (ref 0–1)
BILIRUB DIRECT SERPL-MCNC: 0.2 MG/DL (ref 0–0.2)
BILIRUB SERPL-MCNC: 0.6 MG/DL (ref 0.2–1)
BUN SERPL-MCNC: 21 MG/DL (ref 6–20)
BUN/CREAT SERPL: 32 (ref 12–20)
CALCIUM SERPL-MCNC: 8.3 MG/DL (ref 8.5–10.1)
CHLORIDE SERPL-SCNC: 106 MMOL/L (ref 97–108)
CHOLEST SERPL-MCNC: 107 MG/DL
CO2 SERPL-SCNC: 27 MMOL/L (ref 21–32)
CREAT SERPL-MCNC: 0.65 MG/DL (ref 0.55–1.02)
DIFFERENTIAL METHOD BLD: ABNORMAL
EOSINOPHIL # BLD: 0 K/UL (ref 0–0.4)
EOSINOPHIL NFR BLD: 0 % (ref 0–7)
ERYTHROCYTE [DISTWIDTH] IN BLOOD BY AUTOMATED COUNT: 13.2 % (ref 11.5–14.5)
EST. AVERAGE GLUCOSE BLD GHB EST-MCNC: 128 MG/DL
GLOBULIN SER CALC-MCNC: 4 G/DL (ref 2–4)
GLUCOSE BLD STRIP.AUTO-MCNC: 148 MG/DL (ref 65–100)
GLUCOSE BLD STRIP.AUTO-MCNC: 166 MG/DL (ref 65–100)
GLUCOSE BLD STRIP.AUTO-MCNC: 167 MG/DL (ref 65–100)
GLUCOSE BLD STRIP.AUTO-MCNC: 176 MG/DL (ref 65–100)
GLUCOSE SERPL-MCNC: 208 MG/DL (ref 65–100)
HBA1C MFR BLD: 6.1 % (ref 4.2–6.3)
HCT VFR BLD AUTO: 48.9 % (ref 35–47)
HDLC SERPL-MCNC: 48 MG/DL
HDLC SERPL: 2.2 {RATIO} (ref 0–5)
HGB BLD-MCNC: 15.3 G/DL (ref 11.5–16)
IMM GRANULOCYTES # BLD: 0.1 K/UL (ref 0–0.04)
IMM GRANULOCYTES NFR BLD AUTO: 1 % (ref 0–0.5)
LACTATE SERPL-SCNC: 2.7 MMOL/L (ref 0.4–2)
LACTATE SERPL-SCNC: 4.2 MMOL/L (ref 0.4–2)
LDLC SERPL CALC-MCNC: 36.8 MG/DL (ref 0–100)
LIPASE SERPL-CCNC: >3000 U/L (ref 73–393)
LIPID PROFILE,FLP: NORMAL
LYMPHOCYTES # BLD: 1.9 K/UL (ref 0.8–3.5)
LYMPHOCYTES NFR BLD: 10 % (ref 12–49)
MAGNESIUM SERPL-MCNC: 2 MG/DL (ref 1.6–2.4)
MCH RBC QN AUTO: 29.8 PG (ref 26–34)
MCHC RBC AUTO-ENTMCNC: 31.3 G/DL (ref 30–36.5)
MCV RBC AUTO: 95.3 FL (ref 80–99)
MONOCYTES # BLD: 1.4 K/UL (ref 0–1)
MONOCYTES NFR BLD: 7 % (ref 5–13)
NEUTS SEG # BLD: 16.4 K/UL (ref 1.8–8)
NEUTS SEG NFR BLD: 83 % (ref 32–75)
NRBC # BLD: 0 K/UL (ref 0–0.01)
NRBC BLD-RTO: 0 PER 100 WBC
PLATELET # BLD AUTO: 398 K/UL (ref 150–400)
PMV BLD AUTO: 10.1 FL (ref 8.9–12.9)
POTASSIUM SERPL-SCNC: 3.9 MMOL/L (ref 3.5–5.1)
PROT SERPL-MCNC: 6.9 G/DL (ref 6.4–8.2)
RBC # BLD AUTO: 5.13 M/UL (ref 3.8–5.2)
SERVICE CMNT-IMP: ABNORMAL
SODIUM SERPL-SCNC: 141 MMOL/L (ref 136–145)
TRIGL SERPL-MCNC: 111 MG/DL (ref ?–150)
VLDLC SERPL CALC-MCNC: 22.2 MG/DL
WBC # BLD AUTO: 19.8 K/UL (ref 3.6–11)

## 2018-10-27 PROCEDURE — 83605 ASSAY OF LACTIC ACID: CPT | Performed by: EMERGENCY MEDICINE

## 2018-10-27 PROCEDURE — 74011250637 HC RX REV CODE- 250/637: Performed by: INTERNAL MEDICINE

## 2018-10-27 PROCEDURE — 80053 COMPREHEN METABOLIC PANEL: CPT | Performed by: INTERNAL MEDICINE

## 2018-10-27 PROCEDURE — 74011250636 HC RX REV CODE- 250/636: Performed by: PHYSICIAN ASSISTANT

## 2018-10-27 PROCEDURE — 85025 COMPLETE CBC W/AUTO DIFF WBC: CPT | Performed by: INTERNAL MEDICINE

## 2018-10-27 PROCEDURE — 82248 BILIRUBIN DIRECT: CPT | Performed by: INTERNAL MEDICINE

## 2018-10-27 PROCEDURE — 83690 ASSAY OF LIPASE: CPT | Performed by: INTERNAL MEDICINE

## 2018-10-27 PROCEDURE — 80061 LIPID PANEL: CPT | Performed by: INTERNAL MEDICINE

## 2018-10-27 PROCEDURE — 65270000029 HC RM PRIVATE

## 2018-10-27 PROCEDURE — 99231 SBSQ HOSP IP/OBS SF/LOW 25: CPT | Performed by: FAMILY MEDICINE

## 2018-10-27 PROCEDURE — 82962 GLUCOSE BLOOD TEST: CPT

## 2018-10-27 PROCEDURE — 74011000258 HC RX REV CODE- 258: Performed by: FAMILY MEDICINE

## 2018-10-27 PROCEDURE — 36415 COLL VENOUS BLD VENIPUNCTURE: CPT | Performed by: INTERNAL MEDICINE

## 2018-10-27 PROCEDURE — 74011250636 HC RX REV CODE- 250/636: Performed by: INTERNAL MEDICINE

## 2018-10-27 PROCEDURE — 83036 HEMOGLOBIN GLYCOSYLATED A1C: CPT | Performed by: INTERNAL MEDICINE

## 2018-10-27 PROCEDURE — 83735 ASSAY OF MAGNESIUM: CPT | Performed by: INTERNAL MEDICINE

## 2018-10-27 PROCEDURE — 74011636637 HC RX REV CODE- 636/637: Performed by: INTERNAL MEDICINE

## 2018-10-27 PROCEDURE — 74011250636 HC RX REV CODE- 250/636: Performed by: FAMILY MEDICINE

## 2018-10-27 RX ADMIN — HYDROMORPHONE HYDROCHLORIDE 0.5 MG: 1 INJECTION, SOLUTION INTRAMUSCULAR; INTRAVENOUS; SUBCUTANEOUS at 13:12

## 2018-10-27 RX ADMIN — MEROPENEM 1 G: 1 INJECTION, POWDER, FOR SOLUTION INTRAVENOUS at 04:48

## 2018-10-27 RX ADMIN — Medication 10 ML: at 21:02

## 2018-10-27 RX ADMIN — HEPARIN SODIUM 5000 UNITS: 5000 INJECTION INTRAVENOUS; SUBCUTANEOUS at 06:44

## 2018-10-27 RX ADMIN — ONDANSETRON 4 MG: 2 INJECTION, SOLUTION INTRAMUSCULAR; INTRAVENOUS at 04:48

## 2018-10-27 RX ADMIN — HYDROMORPHONE HYDROCHLORIDE 0.5 MG: 1 INJECTION, SOLUTION INTRAMUSCULAR; INTRAVENOUS; SUBCUTANEOUS at 17:11

## 2018-10-27 RX ADMIN — SODIUM CHLORIDE, SODIUM LACTATE, POTASSIUM CHLORIDE, AND CALCIUM CHLORIDE 250 ML/HR: 600; 310; 30; 20 INJECTION, SOLUTION INTRAVENOUS at 14:07

## 2018-10-27 RX ADMIN — HEPARIN SODIUM 5000 UNITS: 5000 INJECTION INTRAVENOUS; SUBCUTANEOUS at 21:02

## 2018-10-27 RX ADMIN — SODIUM CHLORIDE, SODIUM LACTATE, POTASSIUM CHLORIDE, AND CALCIUM CHLORIDE 250 ML/HR: 600; 310; 30; 20 INJECTION, SOLUTION INTRAVENOUS at 09:16

## 2018-10-27 RX ADMIN — INSULIN LISPRO 2 UNITS: 100 INJECTION, SOLUTION INTRAVENOUS; SUBCUTANEOUS at 02:00

## 2018-10-27 RX ADMIN — ACETAMINOPHEN 650 MG: 325 SOLUTION ORAL at 21:02

## 2018-10-27 RX ADMIN — ONDANSETRON 4 MG: 2 INJECTION, SOLUTION INTRAMUSCULAR; INTRAVENOUS at 00:52

## 2018-10-27 RX ADMIN — HYDROMORPHONE HYDROCHLORIDE 0.5 MG: 1 INJECTION, SOLUTION INTRAMUSCULAR; INTRAVENOUS; SUBCUTANEOUS at 01:29

## 2018-10-27 RX ADMIN — INSULIN LISPRO 2 UNITS: 100 INJECTION, SOLUTION INTRAVENOUS; SUBCUTANEOUS at 12:31

## 2018-10-27 RX ADMIN — HYDROMORPHONE HYDROCHLORIDE 0.5 MG: 1 INJECTION, SOLUTION INTRAMUSCULAR; INTRAVENOUS; SUBCUTANEOUS at 09:12

## 2018-10-27 RX ADMIN — SODIUM CHLORIDE, SODIUM LACTATE, POTASSIUM CHLORIDE, AND CALCIUM CHLORIDE 250 ML/HR: 600; 310; 30; 20 INJECTION, SOLUTION INTRAVENOUS at 18:13

## 2018-10-27 RX ADMIN — Medication 10 ML: at 06:44

## 2018-10-27 RX ADMIN — SODIUM CHLORIDE, SODIUM LACTATE, POTASSIUM CHLORIDE, AND CALCIUM CHLORIDE 250 ML/HR: 600; 310; 30; 20 INJECTION, SOLUTION INTRAVENOUS at 03:49

## 2018-10-27 RX ADMIN — HEPARIN SODIUM 5000 UNITS: 5000 INJECTION INTRAVENOUS; SUBCUTANEOUS at 14:08

## 2018-10-27 RX ADMIN — MEROPENEM 1 G: 1 INJECTION, POWDER, FOR SOLUTION INTRAVENOUS at 21:02

## 2018-10-27 RX ADMIN — ACETAMINOPHEN 650 MG: 325 SOLUTION ORAL at 11:08

## 2018-10-27 RX ADMIN — Medication 10 ML: at 09:20

## 2018-10-27 RX ADMIN — MEROPENEM 1 G: 1 INJECTION, POWDER, FOR SOLUTION INTRAVENOUS at 12:32

## 2018-10-27 RX ADMIN — INSULIN LISPRO 2 UNITS: 100 INJECTION, SOLUTION INTRAVENOUS; SUBCUTANEOUS at 17:18

## 2018-10-27 NOTE — PROGRESS NOTES
00:08 - Received call from lab. Lactic acid is critical at 4.2 
 
00:13 - Paged Dr. Abby Livingston regarding critical lab result. 00:15 - Received call back from Dr. Abby Livingston. Notified physician of above. Also notified physician that pt is on [email or web address] and that blood cultures were done on 10/26. Physician states that pt is being treated appropriately and to recheck lactic acid level again 6 hrs from last time drawn per protocol. No other orders noted at this time.

## 2018-10-27 NOTE — PROGRESS NOTES
06:04 - Received call from lab reporting pt's lactic acid is critically high at 2.7 this AM. Level noted to be trending down.  
 
06:23 - Paged Dr. Jen Armando regarding lab result 06:25 - Received call back from Dr. Jen Armando. Notified physician of pt's lactic acid. No new orders noted at this time.

## 2018-10-27 NOTE — PROGRESS NOTES
GI PROGRESS NOTE 
 
NAME:             Tobin Cuellar :              1945 MRN:              [id number] Admit Date:     10/26/2018 Todays Date:  10/27/2018 Subjective:  
 
    
Abdominal pain: improved Nausea: yes but better Vomiting:no Review of Systems - Respiratory ROS: no cough, shortness of breath, or wheezing Some difficulty taking deep breathe Cardiovascular ROS: no chest pain or dyspnea on exertion Medications-reviewed Current Facility-Administered Medications Medication Dose Route Frequency  glucose chewable tablet 16 g  4 Tab Oral PRN  
 dextrose (D50W) injection syrg 12.5-25 g  25-50 mL IntraVENous PRN  
 glucagon (GLUCAGEN) injection 1 mg  1 mg IntraMUSCular PRN  
 insulin lispro (HUMALOG) injection   SubCUTAneous AC&HS  sodium chloride (NS) flush 5-10 mL  5-10 mL IntraVENous Q8H  
 sodium chloride (NS) flush 5-10 mL  5-10 mL IntraVENous PRN  
 HYDROmorphone (PF) (DILAUDID) injection 0.5 mg  0.5 mg IntraVENous Q4H PRN  
 heparin (porcine) injection 5,000 Units  5,000 Units SubCUTAneous Q8H  
 ondansetron (ZOFRAN) injection 4 mg  4 mg IntraVENous Q4H PRN  
 acetaminophen (TYLENOL) solution 650 mg  650 mg Oral Q6H PRN  
 lactated Ringers infusion  250 mL/hr IntraVENous CONTINUOUS  promethazine (PHENERGAN) 12.5 mg in 0.9% sodium chloride 50 mL IVPB  12.5 mg IntraVENous Q6H PRN  
 meropenem (MERREM) 1 g in 0.9% sodium chloride (MBP/ADV) 50 mL  1 g IntraVENous Q8H Objective:  
 
Patient Vitals for the past 8 hrs: 
 BP Temp Pulse Resp SpO2  
10/27/18 1515 127/68 98.5 °F (36.9 °C) 84 20 92 % 10/27/18 1455 129/88 98.8 °F (37.1 °C) 84 18 94 % 10/27/18 1100 142/78 98.7 °F (37.1 °C) 88 18 90 % No intake/output data recorded. 10/25 190 - 10/27 0700 In: 3112.5 [I.V.:3112.5] Out: - EXAM:   
Visit Vitals /68 Pulse 84 Temp 98.5 °F (36.9 °C) Resp 20 Ht 5' 2\" (1.575 m) Wt 64.8 kg (142 lb 13.7 oz) SpO2 92% BMI 26.13 kg/m² General appearance: alert, cooperative, no distress, appears stated age Lungs: clear to auscultation bilaterally Heart: regular rate and rhythm, S1, S2 normal, no murmur, click, rub or gallop Abdomen: soft, tender. Bowel sounds hypoactive. No masses,  no organomegaly Data Review Recent Labs 10/27/18 
0516 10/26/18 
1310 WBC 19.8* 23.6* HGB 15.3 15.5 HCT 48.9* 48.8*  449* Recent Labs 10/27/18 
0516 10/26/18 
1310  138  
K 3.9 4.2  105 CO2 27 26 BUN 21* 22* CREA 0.65 0.74 * 185* CA 8.3* 9.0 Recent Labs 10/27/18 
0516 10/26/18 
1310 SGOT 81* 100* * 132* TP 6.9 8.2 ALB 2.9* 3.8 GLOB 4.0 4.4* LPSE >3,000* >3,000* Assessment:  
Severe  acute pancreatitis - improved Possible early pancreatic necrosis following Leucocytosis Nausea,vomiting and pain 2ndary to above Gallstones Active Problems: 
  Gall stones (10/26/2018) Pancreatitis (10/26/2018) Plan: 1. Continue NPO, pain and antiemetics, encorage to deep breath and ambulate Karen Ordonez MD

## 2018-10-27 NOTE — PROGRESS NOTES
General Surgery End of Shift Nursing Note Bedside shift change report given to Ester Maria (oncoming nurse) by Sherif Edmondson (offgoing nurse). Report included the following information SBAR, Kardex, Intake/Output, MAR and Recent Results. Shift worked:   C Summary of shift:    0 Issues for physician to address:   0 Number times ambulated in hallway past shift: 2 Number of times OOB to chair past shift: 2 Pain Management: 
Current medication: dilaudid Patient states pain is manageable on current pain medication: YES 
 
GI: 
 
Current diet:  No diet orders on file Tolerating current diet: YES Passing flatus: YES Last Bowel Movement: yesterday Appearance: 0 Respiratory: 
 
Incentive Spirometer at bedside: YES Patient instructed on use: YES Patient Safety: 
 
Falls Score: 1 Bed Alarm On? No 
Sitter?  No 
 
Anatoly James RN

## 2018-10-27 NOTE — PROGRESS NOTES
Pharmacy Automatic Renal Dosing Protocol - Antimicrobials Indication for Antimicrobials: acute pancreatitis w/ possible necrosis Current Regimen of Each Antimicrobial: 
Meropenem 1gm IV q12h; start 10/26; Day #1 Previous Antimicrobial Therapy: 
Zosyn 3.375g IV q8h (Start Date 10/26/18; Day # 1) Goal Level:  
 
Date Dose & Interval Measured (mcg/mL) Extrapolated (mcg/mL) Significant Cultures: N/a Radiology / Imaging results: (X-ray, CT scan or MRI):  
10/26/18 CT abd/ pelv: 
1. Acute pancreatitis. Possible developing necrosis in the tail. No drainable 
fluid collection at this time. 2. Cholelithiasis. Decreased gallstones since . CBD is not dilated. 3. Colonic diverticulosis. No diverticulitis. Paralysis, amputations, malnutrition: none Labs: 
Recent Labs 10/26/18 
1310 CREA 0.74 BUN 22* WBC 23.6* Temp (24hrs), Av.5 °F (36.4 °C), Min:97.3 °F (36.3 °C), Max:97.8 °F (36.6 °C) Creatinine Clearance (mL/min) or Dialysis: 54 ml/min Impression/Plan: · Acute pancreatitis w/ possible necrosis; possible gallstones. To wait on Lap-Jessa · Zosyn changed to Meropenem · Change Meropenem to 1gm IV q8h for CrCl >50 
· Antimicrobial stop date TBD Pharmacy will follow daily and adjust medications as appropriate for renal function and/or serum levels. Thank you, 
Santy Wynn, [facility] Recommended duration of therapy 
[email or web address]/[facility]/Pharmacy/Clinical%[email or web address]. docx Renal Dosing 
[email or web address]/[place]/[facility]/Pharmacy/Clinical%20Companion/Renal%[id number]. pdf

## 2018-10-27 NOTE — PROGRESS NOTES
12:09 AM  called to report critical lactic acid of 4.2; Primary RN, Leva Goldberg, aware. Twyla Phillip RN 
10/27/18 
12:09 AM 
 
12:13 AM Primary RN, Leva Goldberg, paged on call hospitalist to report critical lactic acid; Currently awaiting callback.

## 2018-10-27 NOTE — PROGRESS NOTES
Hospitalist Progress Note NAME: Christ Glover :  1945 MRN:  [id number] Assessment / Plan: 
Acute pancreatitis  POA 
-Lipase > 3000 on admission 
-Triglycerides normal. 
CT abd/pelvis:  Acute pancreatitis; possible developing necrosis in tail. Colonic diverticulosis, gallstones noted. U/S abd:  Cholelithiasis. No acute cholecystitis. Normal CBD. GI, Surgery consulted. No surgical intervention at this time. Recommend repeat CT in next several days. Consultants help appreciated. Keep NPO for now IV fluids Empiric iv abx Prn antiemetic Pain control Check lipid panel  
 
Lactic acidosis 
-likely due to lower bp. Not septic at this time. No SIRS criteria. No fevers or hypothermia. RR and HR stable. Leukocytosis 
-likely reactive 
-monitor Prediabetes -A1c 6.1 HTN  
-hold oral meds PRN hydralazine  
  
HLD- hold meds GERD ? DVT prophylaxis: Heparin 
  
  
Code status: Full code 
  
Surrogate Decision Maker:   
  
GI Prophylaxis: pepcic Body mass index is 26.13 kg/m². Subjective: Chief Complaint / Reason for Physician Visit Follow up for abd pain. Discussed with RN events overnight. C/o generalized abd pain. No n/v. No cp/sob. Review of Systems: 
Symptom Y/N Comments  Symptom Y/N Comments Fever/Chills    Chest Pain Poor Appetite    Edema Cough    Abdominal Pain Sputum    Joint Pain SOB/KLEIN    Pruritis/Rash Nausea/vomit    Tolerating PT/OT Diarrhea    Tolerating Diet Constipation    Other Could NOT obtain due to:   
 
Objective: VITALS:  
Last 24hrs VS reviewed since prior progress note. Most recent are: 
Patient Vitals for the past 24 hrs: 
 Temp Pulse Resp BP SpO2  
10/27/18 1100 98.7 °F (37.1 °C) 88 18 142/78 90 % 10/27/18 0842 98.7 °F (37.1 °C) 92 18 147/75 90 % 10/27/18 0505 98 °F (36.7 °C) 63 17 144/57 90 % 10/26/18 2315 98.1 °F (36.7 °C) 67 20 131/63 90 % 10/26/18 1906  (!) 58     
10/26/18 1902 97.3 °F (36.3 °C) 64 22 118/65 94 % 10/26/18 1800 97.8 °F (36.6 °C) 74 21 105/50 95 % 10/26/18 1715  64 18 112/67 94 % 10/26/18 1700  62 21 121/48 97 % 10/26/18 1645  64 28 122/57 93 % 10/26/18 1630  (!) 58 23 128/49 95 % 10/26/18 1615  (!) 56 21 125/56 95 % 10/26/18 1600  65 24 127/58 93 % 10/26/18 1545  69 20 135/60 95 % 10/26/18 1530  74 27 126/58 94 % 10/26/18 1523  68 16 128/43 98 % 10/26/18 1430  (!) 52 21 104/63 98 % 10/26/18 1414  (!) 54  94/52   
10/26/18 1400  (!) 54 28 93/45 90 % 10/26/18 1347  (!) 55 14 98/48 94 % 10/26/18 1252    93/41 96 % 10/26/18 1208 97.3 °F (36.3 °C) 63 18 116/78 100 % Intake/Output Summary (Last 24 hours) at 10/27/2018 1143 Last data filed at 10/27/2018 2501 Gross per 24 hour Intake 3112.5 ml Output  Net 3112.5 ml PHYSICAL EXAM: 
General: WD, WN. Alert, cooperative, no acute distress   
EENT:  EOMI. Anicteric sclerae. MMM Resp:  CTA bilaterally, no wheezing or rales. No accessory muscle use CV:  Regular  rhythm,  No edema GI:  Soft, Non distended, mild generalized tenderness, no rebound.  +Bowel sounds Neurologic:  Alert and oriented X 3, normal speech, Psych:   Good insight. Not anxious nor agitated Skin:  No rashes. No jaundice Reviewed most current lab test results and cultures  YES Reviewed most current radiology test results   YES Review and summation of old records today    NO Reviewed patient's current orders and MAR    YES 
PMH/SH reviewed - no change compared to H&P 
________________________________________________________________________ Care Plan discussed with: 
  Comments Patient x Family RN Care Manager Consultant Multidiciplinary team rounds were held today with , nursing, pharmacist and clinical coordinator.   Patient's plan of care was discussed; medications were reviewed and discharge planning was addressed. ________________________________________________________________________ Total NON critical care TIME:  25   Minutes Total CRITICAL CARE TIME Spent:   Minutes non procedure based Comments >50% of visit spent in counseling and coordination of care    
________________________________________________________________________ Billy Cuevas MD  
 
Procedures: see electronic medical records for all procedures/Xrays and details which were not copied into this note but were reviewed prior to creation of Plan. LABS: 
I reviewed today's most current labs and imaging studies. Pertinent labs include: 
Recent Labs 10/27/18 
0516 10/26/18 
1310 WBC 19.8* 23.6* HGB 15.3 15.5 HCT 48.9* 48.8*  449* Recent Labs 10/27/18 
0516 10/26/18 
1310  138  
K 3.9 4.2  105 CO2 27 26 * 185* BUN 21* 22* CREA 0.65 0.74 CA 8.3* 9.0 MG 2.0  --   
ALB 2.9* 3.8 TBILI 0.6 0.9 SGOT 81* 100* * 63 Signed: Billy Cuevas MD

## 2018-10-27 NOTE — PROGRESS NOTES
Problem: Pain - Acute Goal: *Pain is controlled to three or less Outcome: Progressing Towards Goal 
Assess pain Q4h  & prn. Medicating with 0.5 mg IV Dilaudid Q4h. Rating pain \"6-8\" out of \"10\" on scale of '0-10\".

## 2018-10-27 NOTE — PROGRESS NOTES
Surgery Lipase still > 3000 Bili wnl, LFTs elevated WBC elevated but trending down Lactic acid elevated Feels slightly better PE unchangesd Cont present RX Leoncio Escamilla MD, FACS 
ED [facility]

## 2018-10-28 LAB
ALBUMIN SERPL-MCNC: 2.5 G/DL (ref 3.5–5)
ALBUMIN/GLOB SERPL: 0.7 {RATIO} (ref 1.1–2.2)
ALP SERPL-CCNC: 90 U/L (ref 45–117)
ALT SERPL-CCNC: 59 U/L (ref 12–78)
ANION GAP SERPL CALC-SCNC: 4 MMOL/L (ref 5–15)
AST SERPL-CCNC: 38 U/L (ref 15–37)
BACTERIA SPEC CULT: NORMAL
BASOPHILS # BLD: 0 K/UL (ref 0–0.1)
BASOPHILS NFR BLD: 0 % (ref 0–1)
BILIRUB SERPL-MCNC: 0.7 MG/DL (ref 0.2–1)
BUN SERPL-MCNC: 18 MG/DL (ref 6–20)
BUN/CREAT SERPL: 40 (ref 12–20)
CALCIUM SERPL-MCNC: 8 MG/DL (ref 8.5–10.1)
CC UR VC: NORMAL
CHLORIDE SERPL-SCNC: 105 MMOL/L (ref 97–108)
CO2 SERPL-SCNC: 31 MMOL/L (ref 21–32)
CREAT SERPL-MCNC: 0.45 MG/DL (ref 0.55–1.02)
DIFFERENTIAL METHOD BLD: ABNORMAL
EOSINOPHIL # BLD: 0 K/UL (ref 0–0.4)
EOSINOPHIL NFR BLD: 0 % (ref 0–7)
ERYTHROCYTE [DISTWIDTH] IN BLOOD BY AUTOMATED COUNT: 13.5 % (ref 11.5–14.5)
GLOBULIN SER CALC-MCNC: 3.6 G/DL (ref 2–4)
GLUCOSE BLD STRIP.AUTO-MCNC: 122 MG/DL (ref 65–100)
GLUCOSE BLD STRIP.AUTO-MCNC: 131 MG/DL (ref 65–100)
GLUCOSE BLD STRIP.AUTO-MCNC: 131 MG/DL (ref 65–100)
GLUCOSE BLD STRIP.AUTO-MCNC: 134 MG/DL (ref 65–100)
GLUCOSE SERPL-MCNC: 142 MG/DL (ref 65–100)
HCT VFR BLD AUTO: 38.6 % (ref 35–47)
HGB BLD-MCNC: 12.5 G/DL (ref 11.5–16)
IMM GRANULOCYTES # BLD: 0 K/UL (ref 0–0.04)
IMM GRANULOCYTES NFR BLD AUTO: 0 % (ref 0–0.5)
LACTATE SERPL-SCNC: 1.3 MMOL/L (ref 0.4–2)
LIPASE SERPL-CCNC: 1309 U/L (ref 73–393)
LYMPHOCYTES # BLD: 0.6 K/UL (ref 0.8–3.5)
LYMPHOCYTES NFR BLD: 2 % (ref 12–49)
MCH RBC QN AUTO: 30.9 PG (ref 26–34)
MCHC RBC AUTO-ENTMCNC: 32.4 G/DL (ref 30–36.5)
MCV RBC AUTO: 95.3 FL (ref 80–99)
MONOCYTES # BLD: 1.8 K/UL (ref 0–1)
MONOCYTES NFR BLD: 6 % (ref 5–13)
NEUTS BAND NFR BLD MANUAL: 6 %
NEUTS SEG # BLD: 27.2 K/UL (ref 1.8–8)
NEUTS SEG NFR BLD: 86 % (ref 32–75)
NRBC # BLD: 0 K/UL (ref 0–0.01)
NRBC BLD-RTO: 0 PER 100 WBC
PLATELET # BLD AUTO: 306 K/UL (ref 150–400)
PMV BLD AUTO: 9.9 FL (ref 8.9–12.9)
POTASSIUM SERPL-SCNC: 3.7 MMOL/L (ref 3.5–5.1)
PROT SERPL-MCNC: 6.1 G/DL (ref 6.4–8.2)
RBC # BLD AUTO: 4.05 M/UL (ref 3.8–5.2)
RBC MORPH BLD: ABNORMAL
SERVICE CMNT-IMP: ABNORMAL
SERVICE CMNT-IMP: NORMAL
SODIUM SERPL-SCNC: 140 MMOL/L (ref 136–145)
WBC # BLD AUTO: 29.6 K/UL (ref 3.6–11)

## 2018-10-28 PROCEDURE — 74011250636 HC RX REV CODE- 250/636: Performed by: INTERNAL MEDICINE

## 2018-10-28 PROCEDURE — 74011250636 HC RX REV CODE- 250/636: Performed by: PHYSICIAN ASSISTANT

## 2018-10-28 PROCEDURE — 82962 GLUCOSE BLOOD TEST: CPT

## 2018-10-28 PROCEDURE — 94760 N-INVAS EAR/PLS OXIMETRY 1: CPT

## 2018-10-28 PROCEDURE — 83605 ASSAY OF LACTIC ACID: CPT

## 2018-10-28 PROCEDURE — 83690 ASSAY OF LIPASE: CPT

## 2018-10-28 PROCEDURE — 74011000250 HC RX REV CODE- 250: Performed by: FAMILY MEDICINE

## 2018-10-28 PROCEDURE — 77010033678 HC OXYGEN DAILY

## 2018-10-28 PROCEDURE — 65270000029 HC RM PRIVATE

## 2018-10-28 PROCEDURE — 80053 COMPREHEN METABOLIC PANEL: CPT

## 2018-10-28 PROCEDURE — 36415 COLL VENOUS BLD VENIPUNCTURE: CPT

## 2018-10-28 PROCEDURE — 74011250636 HC RX REV CODE- 250/636: Performed by: FAMILY MEDICINE

## 2018-10-28 PROCEDURE — 85025 COMPLETE CBC W/AUTO DIFF WBC: CPT

## 2018-10-28 PROCEDURE — 99231 SBSQ HOSP IP/OBS SF/LOW 25: CPT | Performed by: FAMILY MEDICINE

## 2018-10-28 PROCEDURE — 74011000258 HC RX REV CODE- 258: Performed by: FAMILY MEDICINE

## 2018-10-28 PROCEDURE — 77030027138 HC INCENT SPIROMETER -A

## 2018-10-28 RX ORDER — FAMOTIDINE 20 MG/50ML
20 INJECTION, SOLUTION INTRAVENOUS EVERY 12 HOURS
Status: DISCONTINUED | OUTPATIENT
Start: 2018-10-28 | End: 2018-10-28

## 2018-10-28 RX ADMIN — SODIUM CHLORIDE, SODIUM LACTATE, POTASSIUM CHLORIDE, AND CALCIUM CHLORIDE 250 ML/HR: 600; 310; 30; 20 INJECTION, SOLUTION INTRAVENOUS at 13:09

## 2018-10-28 RX ADMIN — HYDROMORPHONE HYDROCHLORIDE 0.5 MG: 1 INJECTION, SOLUTION INTRAMUSCULAR; INTRAVENOUS; SUBCUTANEOUS at 08:50

## 2018-10-28 RX ADMIN — SODIUM CHLORIDE, SODIUM LACTATE, POTASSIUM CHLORIDE, AND CALCIUM CHLORIDE 250 ML/HR: 600; 310; 30; 20 INJECTION, SOLUTION INTRAVENOUS at 08:45

## 2018-10-28 RX ADMIN — MEROPENEM 1 G: 1 INJECTION, POWDER, FOR SOLUTION INTRAVENOUS at 20:27

## 2018-10-28 RX ADMIN — MEROPENEM 1 G: 1 INJECTION, POWDER, FOR SOLUTION INTRAVENOUS at 05:48

## 2018-10-28 RX ADMIN — FAMOTIDINE 20 MG: 10 INJECTION INTRAVENOUS at 20:27

## 2018-10-28 RX ADMIN — HEPARIN SODIUM 5000 UNITS: 5000 INJECTION INTRAVENOUS; SUBCUTANEOUS at 21:12

## 2018-10-28 RX ADMIN — HEPARIN SODIUM 5000 UNITS: 5000 INJECTION INTRAVENOUS; SUBCUTANEOUS at 05:48

## 2018-10-28 RX ADMIN — HYDROMORPHONE HYDROCHLORIDE 0.5 MG: 1 INJECTION, SOLUTION INTRAMUSCULAR; INTRAVENOUS; SUBCUTANEOUS at 21:08

## 2018-10-28 RX ADMIN — HYDROMORPHONE HYDROCHLORIDE 0.5 MG: 1 INJECTION, SOLUTION INTRAMUSCULAR; INTRAVENOUS; SUBCUTANEOUS at 17:10

## 2018-10-28 RX ADMIN — HYDROMORPHONE HYDROCHLORIDE 0.5 MG: 1 INJECTION, SOLUTION INTRAMUSCULAR; INTRAVENOUS; SUBCUTANEOUS at 00:06

## 2018-10-28 RX ADMIN — FAMOTIDINE 20 MG: 10 INJECTION INTRAVENOUS at 12:53

## 2018-10-28 RX ADMIN — Medication 10 ML: at 05:49

## 2018-10-28 RX ADMIN — Medication 10 ML: at 12:49

## 2018-10-28 RX ADMIN — Medication 10 ML: at 08:50

## 2018-10-28 RX ADMIN — HYDROMORPHONE HYDROCHLORIDE 0.5 MG: 1 INJECTION, SOLUTION INTRAMUSCULAR; INTRAVENOUS; SUBCUTANEOUS at 13:06

## 2018-10-28 RX ADMIN — SODIUM CHLORIDE, SODIUM LACTATE, POTASSIUM CHLORIDE, AND CALCIUM CHLORIDE 150 ML/HR: 600; 310; 30; 20 INJECTION, SOLUTION INTRAVENOUS at 20:31

## 2018-10-28 RX ADMIN — MEROPENEM 1 G: 1 INJECTION, POWDER, FOR SOLUTION INTRAVENOUS at 12:47

## 2018-10-28 RX ADMIN — SODIUM CHLORIDE, SODIUM LACTATE, POTASSIUM CHLORIDE, AND CALCIUM CHLORIDE 250 ML/HR: 600; 310; 30; 20 INJECTION, SOLUTION INTRAVENOUS at 04:25

## 2018-10-28 RX ADMIN — Medication 5 ML: at 22:00

## 2018-10-28 RX ADMIN — HEPARIN SODIUM 5000 UNITS: 5000 INJECTION INTRAVENOUS; SUBCUTANEOUS at 13:06

## 2018-10-28 NOTE — PROGRESS NOTES
Hospitalist Progress Note NAME: Ricardo Stout :  1945 MRN:  [id number] Assessment / Plan: 
Acute pancreatitis  POA 
-Lipase > 3000 on admission 
-Triglycerides normal. 
CT abd/pelvis:  Acute pancreatitis; possible developing necrosis in tail. Colonic diverticulosis, gallstones noted. U/S abd:  Cholelithiasis. No acute cholecystitis. Normal CBD. GI, Surgery consulted. No surgical intervention at this time. Recommend repeat CT in next several days. Consultants help appreciated. Keep NPO for now IV fluids Empiric iv abx Prn antiemetic Pain control Check lipase. Lactic acidosis 
-likely due to lower bp. Not septic at this time. No SIRS criteria. No fevers or hypothermia. RR and HR stable. Leukocytosis 
-likely reactive 
-monitor Prediabetes -A1c 6.1 HTN  
-hold oral meds PRN hydralazine  
  
HLD- hold meds GERD ? DVT prophylaxis: Heparin 
  
  
Code status: Full code 
  
Surrogate Decision Maker:   
  
GI Prophylaxis: pepcid Body mass index is 26.13 kg/m². Subjective: Chief Complaint / Reason for Physician Visit Follow up for abd pain. Discussed with RN events overnight. Abdominal pain much better. . No n/v. No cp/sob. Review of Systems: 
Symptom Y/N Comments  Symptom Y/N Comments Fever/Chills    Chest Pain Poor Appetite    Edema Cough    Abdominal Pain Sputum    Joint Pain SOB/KLEIN    Pruritis/Rash Nausea/vomit    Tolerating PT/OT Diarrhea    Tolerating Diet Constipation    Other Could NOT obtain due to:   
 
Objective: VITALS:  
Last 24hrs VS reviewed since prior progress note. Most recent are: 
Patient Vitals for the past 24 hrs: 
 Temp Pulse Resp BP SpO2  
10/28/18 0902 99.6 °F (37.6 °C) (!) 101 18 118/62 94 % 10/28/18 0439 98.7 °F (37.1 °C) (!) 106 20 116/52 (!) 87 % 10/28/18 0123     94 % 10/28/18 0119 98.6 °F (37 °C) (!) 101 16 113/57 (!) 80 % 10/27/18 1515 98.5 °F (36.9 °C) 84 20 127/68 92 % 10/27/18 1455 98.8 °F (37.1 °C) 84 18 129/88 94 % 10/27/18 1100 98.7 °F (37.1 °C) 88 18 142/78 90 % Intake/Output Summary (Last 24 hours) at 10/28/2018 1007 Last data filed at 10/28/2018 7423 Gross per 24 hour Intake 6075 ml Output  Net 6075 ml PHYSICAL EXAM: 
General: WD, WN. Alert, cooperative, no acute distress   
EENT:  EOMI. Anicteric sclerae. MMM Resp:  CTA bilaterally, no wheezing or rales. Decreased breath sounds. No accessory muscle use CV:  Regular  rhythm,  No edema GI:  Soft, Non distended, minimal tenderness, no rebound.  +Bowel sounds Neurologic:  Alert and oriented X 3, normal speech, Psych:   Good insight. Not anxious nor agitated Skin:  No rashes. No jaundice Reviewed most current lab test results and cultures  YES Reviewed most current radiology test results   YES Review and summation of old records today    NO Reviewed patient's current orders and MAR    YES 
PMH/ reviewed - no change compared to H&P 
________________________________________________________________________ Care Plan discussed with: 
  Comments Patient x Family RN Care Manager Consultant Multidiciplinary team rounds were held today with , nursing, pharmacist and clinical coordinator. Patient's plan of care was discussed; medications were reviewed and discharge planning was addressed. ________________________________________________________________________ Total NON critical care TIME:  25   Minutes Total CRITICAL CARE TIME Spent:   Minutes non procedure based Comments >50% of visit spent in counseling and coordination of care    
________________________________________________________________________ Toña Fontenot MD  
 
Procedures: see electronic medical records for all procedures/Xrays and details which were not copied into this note but were reviewed prior to creation of Plan. LABS: 
I reviewed today's most current labs and imaging studies. Pertinent labs include: 
Recent Labs 10/27/18 
0516 10/26/18 
1310 WBC 19.8* 23.6* HGB 15.3 15.5 HCT 48.9* 48.8*  449* Recent Labs 10/27/18 
0516 10/26/18 
1310  138  
K 3.9 4.2  105 CO2 27 26 * 185* BUN 21* 22* CREA 0.65 0.74 CA 8.3* 9.0 MG 2.0  --   
ALB 2.9* 3.8 TBILI 0.6 0.9 SGOT 81* 100* * 63 Signed: Anupam Conti MD

## 2018-10-28 NOTE — PROGRESS NOTES
General Surgery End of Shift Nursing Note Bedside shift change report given to Barbara Morales  (oncoming nurse) by Lottie Herrera (offgoing nurse). Report included the following information SBAR, Kardex, OR Summary, Procedure Summary, Intake/Output, MAR and Accordion. Shift worked:   [id number] Summary of shift:   Patient alert and oriented x 4. O2 saturation within normal range today, supplemental oxygen discontinued. pt sitting up in chair, ABT continued. Patient complained of pain. PRN dilaudid administered per MD orders. No c/o nausea. Labs drawn this shift and sent to lab for further evaluation. 18 derek peripheral IV in left ac placed today. patient NPO with Ice chips. No bowel movement this shift. Family at bedside. Issues for physician to address:  follow up on gallstones, and pancreatitis Number times ambulated in hallway past shift: 0 Number of times OOB to chair past shift: 3 Pain Management: 
Current medication: See [facility] Patient states pain is manageable on current pain medication: YES 
 
GI: 
 
Current diet:  No diet orders on file Tolerating current diet: YES Passing flatus: YES Last Bowel Movement: 10/26/18 Appearance: Lona Sosa, mason Respiratory: 
 
Incentive Spirometer at bedside: YES Patient instructed on use: YES Patient Safety: 
 
Falls Score: 1 Bed Alarm On? No 
Sitter?  No 
 
Christina Vazquez RN

## 2018-10-28 NOTE — PROGRESS NOTES
Surgery Pt reports feeling a bit better each day Lipase starting to trend down 1309 Lactic acid wnl WBC climbing 29.6 PE stable With WBC rising would get CT scan tomorrow to assess necrosis of pancreas and ? If area could be aspirated for culture by IR 
 
Dr Yaneli El will assume surgical evaluation of patient in AM 
 
Tavares Vera MD, [address] [facility]

## 2018-10-28 NOTE — PROGRESS NOTES
Unable to obtain labs and IV with vein finder, and multiple attempts made. Nurse spoke with ED charge nurse who will send someone up shortly

## 2018-10-29 ENCOUNTER — APPOINTMENT (OUTPATIENT)
Dept: CT IMAGING | Age: 73
DRG: 439 | End: 2018-10-29
Attending: FAMILY MEDICINE
Payer: MEDICARE

## 2018-10-29 ENCOUNTER — APPOINTMENT (OUTPATIENT)
Dept: GENERAL RADIOLOGY | Age: 73
DRG: 439 | End: 2018-10-29
Attending: INTERNAL MEDICINE
Payer: MEDICARE

## 2018-10-29 LAB
ALBUMIN SERPL-MCNC: 2.3 G/DL (ref 3.5–5)
ALBUMIN/GLOB SERPL: 0.6 {RATIO} (ref 1.1–2.2)
ALP SERPL-CCNC: 87 U/L (ref 45–117)
ALT SERPL-CCNC: 45 U/L (ref 12–78)
ANION GAP SERPL CALC-SCNC: 5 MMOL/L (ref 5–15)
AST SERPL-CCNC: 30 U/L (ref 15–37)
BASOPHILS # BLD: 0 K/UL (ref 0–0.1)
BASOPHILS NFR BLD: 0 % (ref 0–1)
BILIRUB SERPL-MCNC: 1 MG/DL (ref 0.2–1)
BUN SERPL-MCNC: 18 MG/DL (ref 6–20)
BUN/CREAT SERPL: 60 (ref 12–20)
CALCIUM SERPL-MCNC: 7.8 MG/DL (ref 8.5–10.1)
CHLORIDE SERPL-SCNC: 105 MMOL/L (ref 97–108)
CO2 SERPL-SCNC: 29 MMOL/L (ref 21–32)
CREAT SERPL-MCNC: 0.3 MG/DL (ref 0.55–1.02)
DIFFERENTIAL METHOD BLD: ABNORMAL
EOSINOPHIL # BLD: 0 K/UL (ref 0–0.4)
EOSINOPHIL NFR BLD: 0 % (ref 0–7)
ERYTHROCYTE [DISTWIDTH] IN BLOOD BY AUTOMATED COUNT: 13.4 % (ref 11.5–14.5)
GLOBULIN SER CALC-MCNC: 3.6 G/DL (ref 2–4)
GLUCOSE BLD STRIP.AUTO-MCNC: 119 MG/DL (ref 65–100)
GLUCOSE BLD STRIP.AUTO-MCNC: 120 MG/DL (ref 65–100)
GLUCOSE BLD STRIP.AUTO-MCNC: 122 MG/DL (ref 65–100)
GLUCOSE SERPL-MCNC: 125 MG/DL (ref 65–100)
HCT VFR BLD AUTO: 37.2 % (ref 35–47)
HGB BLD-MCNC: 11.7 G/DL (ref 11.5–16)
IMM GRANULOCYTES # BLD: 0.3 K/UL (ref 0–0.04)
IMM GRANULOCYTES NFR BLD AUTO: 1 % (ref 0–0.5)
LIPASE SERPL-CCNC: 399 U/L (ref 73–393)
LYMPHOCYTES # BLD: 2.3 K/UL (ref 0.8–3.5)
LYMPHOCYTES NFR BLD: 9 % (ref 12–49)
MCH RBC QN AUTO: 30.8 PG (ref 26–34)
MCHC RBC AUTO-ENTMCNC: 31.5 G/DL (ref 30–36.5)
MCV RBC AUTO: 97.9 FL (ref 80–99)
MONOCYTES # BLD: 1.8 K/UL (ref 0–1)
MONOCYTES NFR BLD: 7 % (ref 5–13)
NEUTS SEG # BLD: 20.8 K/UL (ref 1.8–8)
NEUTS SEG NFR BLD: 83 % (ref 32–75)
NRBC # BLD: 0 K/UL (ref 0–0.01)
NRBC BLD-RTO: 0 PER 100 WBC
PLATELET # BLD AUTO: 269 K/UL (ref 150–400)
PMV BLD AUTO: 10.4 FL (ref 8.9–12.9)
POTASSIUM SERPL-SCNC: 3.7 MMOL/L (ref 3.5–5.1)
PROT SERPL-MCNC: 5.9 G/DL (ref 6.4–8.2)
RBC # BLD AUTO: 3.8 M/UL (ref 3.8–5.2)
RBC MORPH BLD: ABNORMAL
SERVICE CMNT-IMP: ABNORMAL
SODIUM SERPL-SCNC: 139 MMOL/L (ref 136–145)
WBC # BLD AUTO: 25.2 K/UL (ref 3.6–11)

## 2018-10-29 PROCEDURE — 74011250636 HC RX REV CODE- 250/636: Performed by: INTERNAL MEDICINE

## 2018-10-29 PROCEDURE — 71046 X-RAY EXAM CHEST 2 VIEWS: CPT

## 2018-10-29 PROCEDURE — 74177 CT ABD & PELVIS W/CONTRAST: CPT

## 2018-10-29 PROCEDURE — 85025 COMPLETE CBC W/AUTO DIFF WBC: CPT

## 2018-10-29 PROCEDURE — 99232 SBSQ HOSP IP/OBS MODERATE 35: CPT | Performed by: SURGERY

## 2018-10-29 PROCEDURE — 74011250636 HC RX REV CODE- 250/636: Performed by: FAMILY MEDICINE

## 2018-10-29 PROCEDURE — 36415 COLL VENOUS BLD VENIPUNCTURE: CPT

## 2018-10-29 PROCEDURE — 74011000250 HC RX REV CODE- 250: Performed by: FAMILY MEDICINE

## 2018-10-29 PROCEDURE — 80053 COMPREHEN METABOLIC PANEL: CPT

## 2018-10-29 PROCEDURE — 74011000258 HC RX REV CODE- 258: Performed by: FAMILY MEDICINE

## 2018-10-29 PROCEDURE — 65270000029 HC RM PRIVATE

## 2018-10-29 PROCEDURE — 82962 GLUCOSE BLOOD TEST: CPT

## 2018-10-29 PROCEDURE — 74011636320 HC RX REV CODE- 636/320: Performed by: INTERNAL MEDICINE

## 2018-10-29 PROCEDURE — 83690 ASSAY OF LIPASE: CPT

## 2018-10-29 PROCEDURE — 74011000255 HC RX REV CODE- 255: Performed by: INTERNAL MEDICINE

## 2018-10-29 RX ORDER — SODIUM CHLORIDE 9 MG/ML
50 INJECTION, SOLUTION INTRAVENOUS
Status: COMPLETED | OUTPATIENT
Start: 2018-10-29 | End: 2018-10-29

## 2018-10-29 RX ORDER — SODIUM CHLORIDE 9 MG/ML
50 INJECTION, SOLUTION INTRAVENOUS CONTINUOUS
Status: DISCONTINUED | OUTPATIENT
Start: 2018-10-29 | End: 2018-11-03 | Stop reason: HOSPADM

## 2018-10-29 RX ORDER — ALBUTEROL SULFATE 0.83 MG/ML
2.5 SOLUTION RESPIRATORY (INHALATION)
Status: DISCONTINUED | OUTPATIENT
Start: 2018-10-29 | End: 2018-11-03 | Stop reason: HOSPADM

## 2018-10-29 RX ORDER — SODIUM CHLORIDE 0.9 % (FLUSH) 0.9 %
10 SYRINGE (ML) INJECTION
Status: COMPLETED | OUTPATIENT
Start: 2018-10-29 | End: 2018-10-29

## 2018-10-29 RX ORDER — BARIUM SULFATE 20 MG/ML
900 SUSPENSION ORAL
Status: COMPLETED | OUTPATIENT
Start: 2018-10-29 | End: 2018-10-29

## 2018-10-29 RX ORDER — FUROSEMIDE 10 MG/ML
20 INJECTION INTRAMUSCULAR; INTRAVENOUS ONCE
Status: COMPLETED | OUTPATIENT
Start: 2018-10-29 | End: 2018-10-29

## 2018-10-29 RX ADMIN — HYDROMORPHONE HYDROCHLORIDE 0.5 MG: 1 INJECTION, SOLUTION INTRAMUSCULAR; INTRAVENOUS; SUBCUTANEOUS at 01:10

## 2018-10-29 RX ADMIN — HEPARIN SODIUM 5000 UNITS: 5000 INJECTION INTRAVENOUS; SUBCUTANEOUS at 23:22

## 2018-10-29 RX ADMIN — Medication 10 ML: at 08:47

## 2018-10-29 RX ADMIN — Medication 10 ML: at 14:35

## 2018-10-29 RX ADMIN — SODIUM CHLORIDE 125 ML/HR: 900 INJECTION, SOLUTION INTRAVENOUS at 11:43

## 2018-10-29 RX ADMIN — BARIUM SULFATE 900 ML: 20 SUSPENSION ORAL at 08:52

## 2018-10-29 RX ADMIN — HEPARIN SODIUM 5000 UNITS: 5000 INJECTION INTRAVENOUS; SUBCUTANEOUS at 14:34

## 2018-10-29 RX ADMIN — HYDROMORPHONE HYDROCHLORIDE 0.5 MG: 1 INJECTION, SOLUTION INTRAMUSCULAR; INTRAVENOUS; SUBCUTANEOUS at 07:38

## 2018-10-29 RX ADMIN — HEPARIN SODIUM 5000 UNITS: 5000 INJECTION INTRAVENOUS; SUBCUTANEOUS at 06:12

## 2018-10-29 RX ADMIN — FAMOTIDINE 20 MG: 10 INJECTION INTRAVENOUS at 08:47

## 2018-10-29 RX ADMIN — Medication 10 ML: at 11:04

## 2018-10-29 RX ADMIN — HYDROMORPHONE HYDROCHLORIDE 0.5 MG: 1 INJECTION, SOLUTION INTRAMUSCULAR; INTRAVENOUS; SUBCUTANEOUS at 14:36

## 2018-10-29 RX ADMIN — IOPAMIDOL 100 ML: 755 INJECTION, SOLUTION INTRAVENOUS at 11:04

## 2018-10-29 RX ADMIN — FAMOTIDINE 20 MG: 10 INJECTION INTRAVENOUS at 23:22

## 2018-10-29 RX ADMIN — SODIUM CHLORIDE 50 ML/HR: 900 INJECTION, SOLUTION INTRAVENOUS at 11:04

## 2018-10-29 RX ADMIN — HYDROMORPHONE HYDROCHLORIDE 0.5 MG: 1 INJECTION, SOLUTION INTRAMUSCULAR; INTRAVENOUS; SUBCUTANEOUS at 23:14

## 2018-10-29 RX ADMIN — MEROPENEM 1 G: 1 INJECTION, POWDER, FOR SOLUTION INTRAVENOUS at 05:32

## 2018-10-29 RX ADMIN — Medication 10 ML: at 17:22

## 2018-10-29 RX ADMIN — SODIUM CHLORIDE, SODIUM LACTATE, POTASSIUM CHLORIDE, AND CALCIUM CHLORIDE 150 ML/HR: 600; 310; 30; 20 INJECTION, SOLUTION INTRAVENOUS at 03:26

## 2018-10-29 RX ADMIN — MEROPENEM 1 G: 1 INJECTION, POWDER, FOR SOLUTION INTRAVENOUS at 12:44

## 2018-10-29 RX ADMIN — FUROSEMIDE 20 MG: 10 INJECTION, SOLUTION INTRAMUSCULAR; INTRAVENOUS at 17:27

## 2018-10-29 RX ADMIN — Medication 10 ML: at 23:23

## 2018-10-29 RX ADMIN — Medication 10 ML: at 07:38

## 2018-10-29 RX ADMIN — MEROPENEM 1 G: 1 INJECTION, POWDER, FOR SOLUTION INTRAVENOUS at 23:22

## 2018-10-29 RX ADMIN — Medication 10 ML: at 05:32

## 2018-10-29 NOTE — PROGRESS NOTES
GI PROGRESS NOTE 
 
NAME:             Melanie Lopez :              1945 MRN:              [id number] Admit Date:     10/26/2018 Todays Date:  10/28/2018 Subjective:  
 
          
Abdominal pain: improved Nausea: yes but better Vomiting:no 
  
Review of Systems - Respiratory ROS: no cough, shortness of breath, or wheezing Some difficulty taking deep breathe Cardiovascular ROS: no chest pain or dyspnea on exertion Medications-reviewed Current Facility-Administered Medications Medication Dose Route Frequency  famotidine (PF) (PEPCID) 20 mg in sodium chloride 0.9% 10 mL injection  20 mg IntraVENous Q12H  
 glucose chewable tablet 16 g  4 Tab Oral PRN  
 dextrose (D50W) injection syrg 12.5-25 g  25-50 mL IntraVENous PRN  
 glucagon (GLUCAGEN) injection 1 mg  1 mg IntraMUSCular PRN  
 insulin lispro (HUMALOG) injection   SubCUTAneous AC&HS  sodium chloride (NS) flush 5-10 mL  5-10 mL IntraVENous Q8H  
 sodium chloride (NS) flush 5-10 mL  5-10 mL IntraVENous PRN  
 HYDROmorphone (PF) (DILAUDID) injection 0.5 mg  0.5 mg IntraVENous Q4H PRN  
 heparin (porcine) injection 5,000 Units  5,000 Units SubCUTAneous Q8H  
 ondansetron (ZOFRAN) injection 4 mg  4 mg IntraVENous Q4H PRN  
 acetaminophen (TYLENOL) solution 650 mg  650 mg Oral Q6H PRN  
 lactated Ringers infusion  150 mL/hr IntraVENous CONTINUOUS  promethazine (PHENERGAN) 12.5 mg in 0.9% sodium chloride 50 mL IVPB  12.5 mg IntraVENous Q6H PRN  
 meropenem (MERREM) 1 g in 0.9% sodium chloride (MBP/ADV) 50 mL  1 g IntraVENous Q8H Objective:  
 
Patient Vitals for the past 8 hrs: 
 BP Temp Pulse Resp SpO2  
10/28/18 1906 136/52 99.3 °F (37.4 °C) (!) 102 18 93 % 10/28/18 1520 128/52 98.4 °F (36.9 °C) 93 18 97 % No intake/output data recorded. 10/27 0701 - 10/28 1900 In: 6125 [I. Q.:9721] Out: - EXAM:   
Visit Vitals /52 (BP 1 Location: Left arm, BP Patient Position: Sitting) Pulse (!) 102 Temp 99.3 °F (37.4 °C) Resp 18 Ht 5' 2\" (1.575 m) Wt 64.8 kg (142 lb 13.7 oz) SpO2 93% BMI 26.13 kg/m² General appearance: alert, cooperative, no distress, appears stated age Lungs: clear to auscultation bilaterally Heart: regular rate and rhythm, S1, S2 normal, no murmur, click, rub or gallop Abdomen: soft, tender. Bowel sounds hypoactive. No masses,  no organomegaly Data Review Recent Labs 10/28/18 
1242 10/27/18 
0516 WBC 29.6* 19.8* HGB 12.5 15.3 HCT 38.6 48.9*  
 398 Recent Labs 10/28/18 
1242 10/27/18 
0516  141  
K 3.7 3.9  106 CO2 31 27 BUN 18 21* CREA 0.45* 0.65 * 208* CA 8.0* 8.3* Recent Labs 10/28/18 
1242 10/27/18 
0516 SGOT 38* 81* AP 90 120* TP 6.1* 6.9 ALB 2.5* 2.9*  
GLOB 3.6 4.0  
LPSE 1,309* >3,000* Assessment:  
Severe  acute pancreatitis - improved but agree WBC increase is concerning Possible early pancreatic necrosis following Leucocytosis Nausea,vomiting and pain 2ndary to above - resolved Gallstones Active Problems: 
  Gall stones (10/26/2018) Pancreatitis (10/26/2018) Plan: 1. CT tomorrow Erin Rodriguez MD

## 2018-10-29 NOTE — PROGRESS NOTES
Call placed to Dr. Kailey Sims re Changing IV fluids so it is compatible with Merrem since patient is maryuri difficult IV stick. Also pt has some exp wheezes and feels very tight in chest, can only raise IS to 500 max,  Could she get a portable chest xray?

## 2018-10-29 NOTE — PROGRESS NOTES
Hospitalist Progress Note NAME: Cielo Dan :  1945 MRN:  [id number] Assessment / Plan: 
Acute pancreatitis  POA 
-Lipase > 3000 on admission 
-Triglycerides normal. 
CT abd/pelvis:  Acute pancreatitis; possible developing necrosis in tail. Colonic diverticulosis, gallstones noted. U/S abd:  Cholelithiasis. No acute cholecystitis. Normal CBD. GI, Surgery consulted. No surgical intervention at this time. NPO on admission. IV fluids Empiric IV abx. Received meropenem. Lipase improved to normal. WBC increased to 29 K. However, clinically improved. No fevers. Pain improved. Repeat CT abd/pelvis 10/29: 
1. Diffuse peripancreatic stranding and fluid consistent with acute 
pancreatitis. No abscess or drainable fluid collection. 2. Diminished enhancement in the neck of the pancreas consistent with pancreatic 
necrosis. 3. New bilateral pleural effusions and lower lobe atelectasis. 4. New ascites in the left lower quadrant and pelvis. 5. Atherosclerotic abdominal aorta without aneurysm. 6. Diverticulosis. CT reviewed by Surgery and GI. No surgical intervention at this time. Clear liquid diet vs post-duodenal feeds via dubhoff starting tomorrow via GI. Bilateral pleural effusions/pulm edema  
-likely due to volume overload. Asymptomatic. 
-decreased IV fluids. 
-Lasix 20 mg IV x 1 
-cont incentive spirometry Lactic acidosis 
-likely due to lower bp. Not septic at this time. No SIRS criteria. No fevers or hypothermia. RR and HR stable. Resolved. Leukocytosis 
-likely reactive 
-monitor Prediabetes -A1c 6.1 HTN  
-hold oral meds PRN hydralazine  
  
HLD- hold meds GERD ? Plan: As above Recheck labs in am. 
Dc once tolerating oral diet DVT prophylaxis: Heparin 
  
  
Code status: Full code 
  
Surrogate Decision Maker:   
  
GI Prophylaxis: pepcid Body mass index is 26.13 kg/m². Subjective: Chief Complaint / Reason for Physician Visit Follow up for abd pain. Discussed with RN events overnight. Much less abdominal pain. Wants to eat. No n/v. No cp/sob. Review of Systems: 
Symptom Y/N Comments  Symptom Y/N Comments Fever/Chills    Chest Pain Poor Appetite    Edema Cough    Abdominal Pain Sputum    Joint Pain SOB/KLEIN    Pruritis/Rash Nausea/vomit    Tolerating PT/OT Diarrhea    Tolerating Diet Constipation    Other Could NOT obtain due to:   
 
Objective: VITALS:  
Last 24hrs VS reviewed since prior progress note. Most recent are: 
Patient Vitals for the past 24 hrs: 
 Temp Pulse Resp BP SpO2  
10/29/18 0728 98.5 °F (36.9 °C) 80 18 122/41 96 % 10/29/18 0352 97.7 °F (36.5 °C) 98 18 143/64 90 % 10/29/18 0026 98 °F (36.7 °C) (!) 101 18 128/57 90 % 10/28/18 1906 99.3 °F (37.4 °C) (!) 102 18 136/52 93 % 10/28/18 1520 98.4 °F (36.9 °C) 93 18 128/52 97 % 10/28/18 1227 98.5 °F (36.9 °C) (!) 107 18 122/70 93 % 10/28/18 0902 99.6 °F (37.6 °C) (!) 101 18 118/62 94 % Intake/Output Summary (Last 24 hours) at 10/29/2018 1040 Last data filed at 10/29/2018 0114 Gross per 24 hour Intake 4315 ml Output  Net 4315 ml PHYSICAL EXAM: 
General: WD, WN. Alert, cooperative, no acute distress   
EENT:  EOMI. Anicteric sclerae. MMM Resp:  CTA bilaterally, no wheezing or rales. Decreased breath sounds. No accessory muscle use CV:  Regular  rhythm,  No edema GI:  Soft, Non distended, minimal tenderness, no rebound.  +Bowel sounds Neurologic:  Alert and oriented X 3, normal speech, Psych:   Good insight. Not anxious nor agitated Skin:  No rashes. No jaundice Reviewed most current lab test results and cultures  YES Reviewed most current radiology test results   YES Review and summation of old records today    NO Reviewed patient's current orders and MAR    YES 
PMH/SH reviewed - no change compared to H&P 
 ________________________________________________________________________ Care Plan discussed with: 
  Comments Patient x Family RN x Care Manager Consultant Multidiciplinary team rounds were held today with , nursing, pharmacist and clinical coordinator. Patient's plan of care was discussed; medications were reviewed and discharge planning was addressed. ________________________________________________________________________ Total NON critical care TIME:  25   Minutes Total CRITICAL CARE TIME Spent:   Minutes non procedure based Comments >50% of visit spent in counseling and coordination of care    
________________________________________________________________________ Suzan Rinne, MD  
 
Procedures: see electronic medical records for all procedures/Xrays and details which were not copied into this note but were reviewed prior to creation of Plan. LABS: 
I reviewed today's most current labs and imaging studies. Pertinent labs include: 
Recent Labs 10/29/18 
4240 10/28/18 
1242 10/27/18 
7423 WBC 25.2* 29.6* 19.8* HGB 11.7 12.5 15.3 HCT 37.2 38.6 48.9*  
 306 398 Recent Labs 10/29/18 
8873 10/28/18 
1242 10/27/18 
2962  140 141  
K 3.7 3.7 3.9  105 106 CO2 29 31 27 * 142* 208* BUN 18 18 21* CREA 0.30* 0.45* 0.65 CA 7.8* 8.0* 8.3*  
MG  --   --  2.0 ALB 2.3* 2.5* 2.9* TBILI 1.0 0.7 0.6 SGOT 30 38* 81* ALT 45 59 127* Signed: Suzan Rinne, MD

## 2018-10-29 NOTE — PROGRESS NOTES
Left AC IV access was removed at 11:08am.  Site was puffy after CT IV Dye injection and suffered approximately 10cc infiltration of normal saline. Floor RN Darren Guidry) was notified.

## 2018-10-29 NOTE — PROGRESS NOTES
GI PROGRESS NOTE 
 
NAME:             Tobin Cuellar :              1945 MRN:              [id number] Admit Date:     10/26/2018 Todays Date:  10/29/2018 Subjective:  
 
          
Abdominal pain: improved Nausea: yes but better Vomiting:no 
 
+ flatus. . Had CT:Reviewed. 
  
Review of Systems - Respiratory ROS: no cough, shortness of breath, or wheezing Some difficulty taking deep breathe Cardiovascular ROS: no chest pain or dyspnea on exertion Medications-reviewed Current Facility-Administered Medications Medication Dose Route Frequency  0.9% sodium chloride infusion  75 mL/hr IntraVENous CONTINUOUS  
 albuterol (PROVENTIL VENTOLIN) nebulizer solution 2.5 mg  2.5 mg Nebulization Q4H PRN  
 famotidine (PF) (PEPCID) 20 mg in sodium chloride 0.9% 10 mL injection  20 mg IntraVENous Q12H  
 glucose chewable tablet 16 g  4 Tab Oral PRN  
 dextrose (D50W) injection syrg 12.5-25 g  25-50 mL IntraVENous PRN  
 glucagon (GLUCAGEN) injection 1 mg  1 mg IntraMUSCular PRN  
 insulin lispro (HUMALOG) injection   SubCUTAneous AC&HS  sodium chloride (NS) flush 5-10 mL  5-10 mL IntraVENous Q8H  
 sodium chloride (NS) flush 5-10 mL  5-10 mL IntraVENous PRN  
 HYDROmorphone (PF) (DILAUDID) injection 0.5 mg  0.5 mg IntraVENous Q4H PRN  
 heparin (porcine) injection 5,000 Units  5,000 Units SubCUTAneous Q8H  
 ondansetron (ZOFRAN) injection 4 mg  4 mg IntraVENous Q4H PRN  
 acetaminophen (TYLENOL) solution 650 mg  650 mg Oral Q6H PRN  promethazine (PHENERGAN) 12.5 mg in 0.9% sodium chloride 50 mL IVPB  12.5 mg IntraVENous Q6H PRN  
 meropenem (MERREM) 1 g in 0.9% sodium chloride (MBP/ADV) 50 mL  1 g IntraVENous Q8H Objective:  
 
Patient Vitals for the past 8 hrs: 
 BP Temp Pulse Resp SpO2  
10/29/18 1313 113/68 98.2 °F (36.8 °C) 76 16 90 % 10/29/18 0728 122/41 98.5 °F (36.9 °C) 80 18 96 % 10/29 0701 - 10/29 1900 In: -  
 Out: 200 [Urine:200] 10/27 1901 - 10/29 0700 In: 7319.2 [I.V.:7319.2] Out: - EXAM:   
Visit Vitals /68 (BP 1 Location: Right arm, BP Patient Position: Sitting) Pulse 76 Temp 98.2 °F (36.8 °C) Resp 16 Ht 5' 2\" (1.575 m) Wt 64.8 kg (142 lb 13.7 oz) SpO2 90% BMI 26.13 kg/m² General appearance: alert, cooperative, no distress, appears stated age Lungs: clear to auscultation bilaterally Heart: regular rate and rhythm, S1, S2 normal, no murmur, click, rub or gallop Abdomen: soft, tender in epigastriium. Bowel sounds hypoactive. No masses,  no organomegaly Data Review Recent Labs 10/29/18 
0328 10/28/18 
1242 WBC 25.2* 29.6* HGB 11.7 12.5 HCT 37.2 38.6  306 Recent Labs 10/29/18 
0328 10/28/18 
1242  140  
K 3.7 3.7  105 CO2 29 31 BUN 18 18 CREA 0.30* 0.45* * 142* CA 7.8* 8.0* Recent Labs 10/29/18 
0328 10/28/18 
1242 SGOT 30 38* AP 87 90  
TP 5.9* 6.1* ALB 2.3* 2.5*  
GLOB 3.6 3.6 LPSE 399* 1,309* Assessment:  
Severe  acute pancreatitis  
pancreatic necrosis Leucocytosis Nausea,vomiting and pain- better Gallstones Active Problems: 
  Gall stones (10/26/2018) Pancreatitis (10/26/2018) Plan:  
· CTshows diffuse peripancreatic stranding and fluid consistent with acute · pancreatitis. No abscess or drainable fluid collection. Diminished enhancement in the neck of the pancreas consistent with pancreatic necrosis. · At risk for a prolonged complicated course but has flatus and pain is better. · Agree with holding off GB surgery for now. · Clear liquids tomorrow vs post duodenal feeds via Dobhoff. · Serial exam. 
· Continue IVF but decrease rate. · IV PPI and DVT prophylaxis.  
  
 
 
Kristine Chiu MD

## 2018-10-29 NOTE — PROGRESS NOTES
General Surgery End of Shift Nursing Note Bedside shift change report given to Ginger Bhatt RN (oncoming nurse) by Magnus Harris RN RN (offgoing nurse). Report included the following information SBAR, Kardex, Intake/Output, MAR, Recent Results and Cardiac Rhythm SR. Shift worked:   7A-7P Summary of shift:   CT completed today./  IV Fluids changed to NS at 125 ml/hr. Chest xray done today for \"chest tightness\" and IS only up to 500 ml. Left arm IV infiltrated in CT, ice pack to swelling. IV lasix given today for lung congestion. Pt feeling better tonight after lasix. Can raise IS to 650 ml. Issues for physician to address:   none Number times ambulated in hallway past shift: 2 Number of times OOB to chair past shift: 2 Pain Management: 
Current medication: Dilaudid 0.5mg IVP Patient states pain is manageable on current pain medication: YES 
 
GI: 
 
Current diet:  DIET NPO With [employer] Tolerating current diet: YES Passing flatus: YES Last Bowel Movement:  
 
 
Respiratory: 
 
Incentive Spirometer at bedside: Yes Patient instructed on use: KASSANDRA Patient Safety: 
 
Falls Score: 2 Bed Alarm On? No 
Sitter?  No 
 
Rogelio Carroll RN

## 2018-10-29 NOTE — PROGRESS NOTES
Surgery CT reviewed. Significant persistent pancreatitis with concern for pancreatic necrosis. High risk for complications from cholecystectomy in the acute setting. Recommend continue medical management and outpatient follow up for elective cholecystectomy in no less than six weeks from now. No indication for percutaneous cholecystostomy. Diet per attending service. Jimmy Ramirez. Doc MD [place], [address] [facility]

## 2018-10-29 NOTE — PROGRESS NOTES
Admit Date: 10/26/2018 POD * No surgery found * Procedure:  * No surgery found * Subjective:  
 
Patient feeling better overall. Some back pain related to bed. Some epigastric pain. Nausea resolved. Objective:  
 
Blood pressure 122/41, pulse 80, temperature 98.5 °F (36.9 °C), resp. rate 18, height 5' 2\" (1.575 m), weight 142 lb 13.7 oz (64.8 kg), SpO2 96 %. Temp (24hrs), Av.6 °F (37 °C), Min:97.7 °F (36.5 °C), Max:99.6 °F (37.6 °C) Physical Exam:  GENERAL: alert, cooperative, no distress, appears stated age, LUNG: clear to auscultation bilaterally, HEART: regular rate and rhythm, ABDOMEN: soft, non-tender. Bowel sounds normal. No masses,  no organomegaly, EXTREMITIES:  extremities normal, atraumatic, no cyanosis or edema Labs:  
Recent Results (from the past 24 hour(s)) GLUCOSE, POC Collection Time: 10/28/18  9:15 AM  
Result Value Ref Range Glucose (POC) 131 (H) 65 - 100 mg/dL Performed by Faheem Garza (PCT) GLUCOSE, POC Collection Time: 10/28/18 12:36 PM  
Result Value Ref Range Glucose (POC) 134 (H) 65 - 100 mg/dL Performed by Faheem Garza (PCT) LIPASE Collection Time: 10/28/18 12:42 PM  
Result Value Ref Range Lipase 1,309 (H) 73 - 393 U/L  
CBC WITH AUTOMATED DIFF Collection Time: 10/28/18 12:42 PM  
Result Value Ref Range WBC 29.6 (H) 3.6 - 11.0 K/uL  
 RBC 4.05 3.80 - 5.20 M/uL  
 HGB 12.5 11.5 - 16.0 g/dL HCT 38.6 35.0 - 47.0 % MCV 95.3 80.0 - 99.0 FL  
 MCH 30.9 26.0 - 34.0 PG  
 MCHC 32.4 30.0 - 36.5 g/dL  
 RDW 13.5 11.5 - 14.5 % PLATELET 739 605 - 469 K/uL MPV 9.9 8.9 - 12.9 FL  
 NRBC 0.0 0  WBC ABSOLUTE NRBC 0.00 0.00 - 0.01 K/uL NEUTROPHILS 86 (H) 32 - 75 % BAND NEUTROPHILS 6 % LYMPHOCYTES 2 (L) 12 - 49 % MONOCYTES 6 5 - 13 % EOSINOPHILS 0 0 - 7 % BASOPHILS 0 0 - 1 % IMMATURE GRANULOCYTES 0 0.0 - 0.5 % ABS. NEUTROPHILS 27.2 (H) 1.8 - 8.0 K/UL ABS. LYMPHOCYTES 0.6 (L) 0.8 - 3.5 K/UL  
 ABS. MONOCYTES 1.8 (H) 0.0 - 1.0 K/UL  
 ABS. EOSINOPHILS 0.0 0.0 - 0.4 K/UL  
 ABS. BASOPHILS 0.0 0.0 - 0.1 K/UL  
 ABS. IMM. GRANS. 0.0 0.00 - 0.04 K/UL  
 DF AUTOMATED    
 RBC COMMENTS NORMOCYTIC, NORMOCHROMIC METABOLIC PANEL, COMPREHENSIVE Collection Time: 10/28/18 12:42 PM  
Result Value Ref Range Sodium 140 136 - 145 mmol/L Potassium 3.7 3.5 - 5.1 mmol/L Chloride 105 97 - 108 mmol/L  
 CO2 31 21 - 32 mmol/L Anion gap 4 (L) 5 - 15 mmol/L Glucose 142 (H) 65 - 100 mg/dL BUN 18 6 - 20 MG/DL Creatinine 0.45 (L) 0.55 - 1.02 MG/DL  
 BUN/Creatinine ratio 40 (H) 12 - 20 GFR est AA >60 >60 ml/min/1.73m2 GFR est non-AA >60 >60 ml/min/1.73m2 Calcium 8.0 (L) 8.5 - 10.1 MG/DL Bilirubin, total 0.7 0.2 - 1.0 MG/DL  
 ALT (SGPT) 59 12 - 78 U/L  
 AST (SGOT) 38 (H) 15 - 37 U/L Alk. phosphatase 90 45 - 117 U/L Protein, total 6.1 (L) 6.4 - 8.2 g/dL Albumin 2.5 (L) 3.5 - 5.0 g/dL Globulin 3.6 2.0 - 4.0 g/dL A-G Ratio 0.7 (L) 1.1 - 2.2 LACTIC ACID Collection Time: 10/28/18 12:42 PM  
Result Value Ref Range Lactic acid 1.3 0.4 - 2.0 MMOL/L  
GLUCOSE, POC Collection Time: 10/28/18  4:08 PM  
Result Value Ref Range Glucose (POC) 131 (H) 65 - 100 mg/dL Performed by Hazel Cruz (PCT) GLUCOSE, POC Collection Time: 10/28/18  8:54 PM  
Result Value Ref Range Glucose (POC) 122 (H) 65 - 100 mg/dL Performed by Kristeen Caper (PCT) LIPASE Collection Time: 10/29/18  3:28 AM  
Result Value Ref Range Lipase 399 (H) 73 - 393 U/L  
CBC WITH AUTOMATED DIFF Collection Time: 10/29/18  3:28 AM  
Result Value Ref Range WBC 25.2 (H) 3.6 - 11.0 K/uL  
 RBC 3.80 3.80 - 5.20 M/uL  
 HGB 11.7 11.5 - 16.0 g/dL HCT 37.2 35.0 - 47.0 % MCV 97.9 80.0 - 99.0 FL  
 MCH 30.8 26.0 - 34.0 PG  
 MCHC 31.5 30.0 - 36.5 g/dL  
 RDW 13.4 11.5 - 14.5 % PLATELET 091 135 - 726 K/uL  MPV 10.4 8.9 - 12.9 FL  
 NRBC 0.0 0  WBC ABSOLUTE NRBC 0.00 0.00 - 0.01 K/uL NEUTROPHILS 83 (H) 32 - 75 % LYMPHOCYTES 9 (L) 12 - 49 % MONOCYTES 7 5 - 13 % EOSINOPHILS 0 0 - 7 % BASOPHILS 0 0 - 1 % IMMATURE GRANULOCYTES 1 (H) 0.0 - 0.5 % ABS. NEUTROPHILS 20.8 (H) 1.8 - 8.0 K/UL  
 ABS. LYMPHOCYTES 2.3 0.8 - 3.5 K/UL  
 ABS. MONOCYTES 1.8 (H) 0.0 - 1.0 K/UL  
 ABS. EOSINOPHILS 0.0 0.0 - 0.4 K/UL  
 ABS. BASOPHILS 0.0 0.0 - 0.1 K/UL  
 ABS. IMM. GRANS. 0.3 (H) 0.00 - 0.04 K/UL  
 DF AUTOMATED    
 RBC COMMENTS NORMOCYTIC, NORMOCHROMIC METABOLIC PANEL, COMPREHENSIVE Collection Time: 10/29/18  3:28 AM  
Result Value Ref Range Sodium 139 136 - 145 mmol/L Potassium 3.7 3.5 - 5.1 mmol/L Chloride 105 97 - 108 mmol/L  
 CO2 29 21 - 32 mmol/L Anion gap 5 5 - 15 mmol/L Glucose 125 (H) 65 - 100 mg/dL BUN 18 6 - 20 MG/DL Creatinine 0.30 (L) 0.55 - 1.02 MG/DL  
 BUN/Creatinine ratio 60 (H) 12 - 20 GFR est AA >60 >60 ml/min/1.73m2 GFR est non-AA >60 >60 ml/min/1.73m2 Calcium 7.8 (L) 8.5 - 10.1 MG/DL Bilirubin, total 1.0 0.2 - 1.0 MG/DL  
 ALT (SGPT) 45 12 - 78 U/L  
 AST (SGOT) 30 15 - 37 U/L Alk. phosphatase 87 45 - 117 U/L Protein, total 5.9 (L) 6.4 - 8.2 g/dL Albumin 2.3 (L) 3.5 - 5.0 g/dL Globulin 3.6 2.0 - 4.0 g/dL A-G Ratio 0.6 (L) 1.1 - 2.2 GLUCOSE, POC Collection Time: 10/29/18  7:49 AM  
Result Value Ref Range Glucose (POC) 119 (H) 65 - 100 mg/dL Performed by Emma Gibson (PCT) Data Review images and reports reviewed Assessment:  
 
Active Problems: 
  Gall stones (10/26/2018) Pancreatitis (10/26/2018) Plan/Recommendations/Medical Decision Making: Pt with pancreatitis on presentation, cholelithiasis. Marked leukocytosis persists. Pancreatitis improving by lab, Lipase 399. Interval CT pending for today.   Pt requires cholecystectomy, would favor performing during this hospitalization vs interval cholecystectomy in 6 weeks given persistent leukocytosis. Will follow up with CT results before making final determination. Espinoza Mane. Janell Rothman MD, [facility] [address] [facility]

## 2018-10-29 NOTE — PROGRESS NOTES
General Surgery End of Shift Nursing Note Bedside shift change report given to Poonam Bernal (oncoming nurse) by Cleora Sandhoff RN (offgoing nurse). Report included the following information SBAR, Kardex, Intake/Output, MAR, Recent Results and Cardiac Rhythm SR. Shift worked:   7p-7a Summary of shift:    Medicated for c/o back pain, slept in recliner NPO, CT this am  
Issues for physician to address:   none Number times ambulated in hallway past shift: 0 Number of times OOB to chair past shift: 2 Pain Management: 
Current medication: Dilaudid 0.5mg IVP Patient states pain is manageable on current pain medication: YES 
 
GI: 
 
Current diet:  DIET NPO With [employer] Tolerating current diet: YES Passing flatus: YES Last Bowel Movement:  
 
 
Respiratory: 
 
Incentive Spirometer at bedside: NO 
Patient instructed on use: NO 
 
Patient Safety: 
 
Falls Score: 2 Bed Alarm On? No 
Sitter?  No 
 
Shilo Coleman RN

## 2018-10-29 NOTE — DIABETES MGMT
DTC Consult Note Recommendations/ Comments:  
 
Current hospital DM medication: Humalog for correction, normal sensitivity scale Consult received for:  [x]             Assessment of home management Chart reviewed and initial evaluation complete on [address] Patient is a 68 y.o. female with hx of diabetes controlled with diet at home. Pt reports to eat 3 meals a day. She sees Dr Silvia Noguera and follows up with PCP regularly. She started weight watchers and has a good understanding of controlling carbohydrates for bg control . Assessed and instructed patient on the following:  
·  interpretation of lab results, blood sugar goals, hypoglycemia prevention and treatment and nutrition Encouraged the following:  
· increased exercise, dietary modifications: plate method, continue avoiding concentrated sweets, follow up with PCP at least every 6 months Provided patient with the following: [x]             Survival skills education materials []             Insulin education materials []             CHO counting education materials [x]             Outpatient DTC contact number 
             []             Glucometer Discussed with patient and/or family need for follow up appointment for diabetes management after discharge. A1c:  
Lab Results Component Value Date/Time Hemoglobin A1c 6.1 10/27/2018 05:04 AM  
 
 
Recent Glucose Results:  
Lab Results Component Value Date/Time  (H) 10/29/2018 03:28 AM  
  (H) 10/28/2018 12:42 PM  
 GLUCPOC 119 (H) 10/29/2018 07:49 AM  
 GLUCPOC 122 (H) 10/28/2018 08:54 PM  
 GLUCPOC 131 (H) 10/28/2018 04:08 PM  
  
 
Lab Results Component Value Date/Time Creatinine 0.30 (L) 10/29/2018 03:28 AM  
 
Estimated Creatinine Clearance: 147.6 mL/min (A) (based on SCr of 0.3 mg/dL (L)). Active Orders Diet DIET NPO With [employer] PO intake: No data found. Will continue to follow as needed. Thank you. Tejinder Miles RD, [facility] Pager: [phone]

## 2018-10-30 LAB
ALBUMIN SERPL-MCNC: 2.3 G/DL (ref 3.5–5)
ALBUMIN/GLOB SERPL: 0.6 {RATIO} (ref 1.1–2.2)
ALP SERPL-CCNC: 89 U/L (ref 45–117)
ALT SERPL-CCNC: 43 U/L (ref 12–78)
ANION GAP SERPL CALC-SCNC: 7 MMOL/L (ref 5–15)
AST SERPL-CCNC: 27 U/L (ref 15–37)
BASOPHILS # BLD: 0 K/UL (ref 0–0.1)
BASOPHILS NFR BLD: 0 % (ref 0–1)
BILIRUB SERPL-MCNC: 0.7 MG/DL (ref 0.2–1)
BUN SERPL-MCNC: 21 MG/DL (ref 6–20)
BUN/CREAT SERPL: 66 (ref 12–20)
CALCIUM SERPL-MCNC: 7.7 MG/DL (ref 8.5–10.1)
CHLORIDE SERPL-SCNC: 104 MMOL/L (ref 97–108)
CO2 SERPL-SCNC: 28 MMOL/L (ref 21–32)
CREAT SERPL-MCNC: 0.32 MG/DL (ref 0.55–1.02)
DIFFERENTIAL METHOD BLD: ABNORMAL
EOSINOPHIL # BLD: 0 K/UL (ref 0–0.4)
EOSINOPHIL NFR BLD: 0 % (ref 0–7)
ERYTHROCYTE [DISTWIDTH] IN BLOOD BY AUTOMATED COUNT: 13.2 % (ref 11.5–14.5)
GLOBULIN SER CALC-MCNC: 3.9 G/DL (ref 2–4)
GLUCOSE BLD STRIP.AUTO-MCNC: 104 MG/DL (ref 65–100)
GLUCOSE BLD STRIP.AUTO-MCNC: 110 MG/DL (ref 65–100)
GLUCOSE BLD STRIP.AUTO-MCNC: 114 MG/DL (ref 65–100)
GLUCOSE BLD STRIP.AUTO-MCNC: 123 MG/DL (ref 65–100)
GLUCOSE BLD STRIP.AUTO-MCNC: 153 MG/DL (ref 65–100)
GLUCOSE SERPL-MCNC: 112 MG/DL (ref 65–100)
HCT VFR BLD AUTO: 36.2 % (ref 35–47)
HGB BLD-MCNC: 11.3 G/DL (ref 11.5–16)
IGG4 SER-MCNC: NORMAL MG/DL
IMM GRANULOCYTES # BLD: 0.1 K/UL (ref 0–0.04)
IMM GRANULOCYTES NFR BLD AUTO: 1 % (ref 0–0.5)
LIPASE SERPL-CCNC: 129 U/L (ref 73–393)
LYMPHOCYTES # BLD: 2.1 K/UL (ref 0.8–3.5)
LYMPHOCYTES NFR BLD: 11 % (ref 12–49)
MCH RBC QN AUTO: 30.3 PG (ref 26–34)
MCHC RBC AUTO-ENTMCNC: 31.2 G/DL (ref 30–36.5)
MCV RBC AUTO: 97.1 FL (ref 80–99)
MONOCYTES # BLD: 1.3 K/UL (ref 0–1)
MONOCYTES NFR BLD: 7 % (ref 5–13)
NEUTS SEG # BLD: 15.6 K/UL (ref 1.8–8)
NEUTS SEG NFR BLD: 81 % (ref 32–75)
NRBC # BLD: 0 K/UL (ref 0–0.01)
NRBC BLD-RTO: 0 PER 100 WBC
PLATELET # BLD AUTO: 274 K/UL (ref 150–400)
PMV BLD AUTO: 10.2 FL (ref 8.9–12.9)
POTASSIUM SERPL-SCNC: 3.4 MMOL/L (ref 3.5–5.1)
PROT SERPL-MCNC: 6.2 G/DL (ref 6.4–8.2)
RBC # BLD AUTO: 3.73 M/UL (ref 3.8–5.2)
SERVICE CMNT-IMP: ABNORMAL
SODIUM SERPL-SCNC: 139 MMOL/L (ref 136–145)
WBC # BLD AUTO: 19.2 K/UL (ref 3.6–11)

## 2018-10-30 PROCEDURE — 74011250636 HC RX REV CODE- 250/636: Performed by: INTERNAL MEDICINE

## 2018-10-30 PROCEDURE — 80053 COMPREHEN METABOLIC PANEL: CPT

## 2018-10-30 PROCEDURE — 85025 COMPLETE CBC W/AUTO DIFF WBC: CPT

## 2018-10-30 PROCEDURE — 74011250637 HC RX REV CODE- 250/637: Performed by: INTERNAL MEDICINE

## 2018-10-30 PROCEDURE — 36415 COLL VENOUS BLD VENIPUNCTURE: CPT

## 2018-10-30 PROCEDURE — 65270000029 HC RM PRIVATE

## 2018-10-30 PROCEDURE — 74011000250 HC RX REV CODE- 250: Performed by: INTERNAL MEDICINE

## 2018-10-30 PROCEDURE — 74011250636 HC RX REV CODE- 250/636: Performed by: FAMILY MEDICINE

## 2018-10-30 PROCEDURE — 99232 SBSQ HOSP IP/OBS MODERATE 35: CPT | Performed by: SURGERY

## 2018-10-30 PROCEDURE — 82962 GLUCOSE BLOOD TEST: CPT

## 2018-10-30 PROCEDURE — 74011000258 HC RX REV CODE- 258: Performed by: FAMILY MEDICINE

## 2018-10-30 PROCEDURE — 94640 AIRWAY INHALATION TREATMENT: CPT

## 2018-10-30 PROCEDURE — 74011000250 HC RX REV CODE- 250: Performed by: FAMILY MEDICINE

## 2018-10-30 PROCEDURE — 83690 ASSAY OF LIPASE: CPT

## 2018-10-30 RX ORDER — POTASSIUM CHLORIDE 7.45 MG/ML
10 INJECTION INTRAVENOUS ONCE
Status: COMPLETED | OUTPATIENT
Start: 2018-10-30 | End: 2018-10-30

## 2018-10-30 RX ORDER — IPRATROPIUM BROMIDE AND ALBUTEROL SULFATE 2.5; .5 MG/3ML; MG/3ML
3 SOLUTION RESPIRATORY (INHALATION)
Status: DISCONTINUED | OUTPATIENT
Start: 2018-10-30 | End: 2018-11-01

## 2018-10-30 RX ADMIN — POTASSIUM CHLORIDE 10 MEQ: 10 INJECTION, SOLUTION INTRAVENOUS at 08:29

## 2018-10-30 RX ADMIN — SODIUM CHLORIDE 75 ML/HR: 900 INJECTION, SOLUTION INTRAVENOUS at 08:25

## 2018-10-30 RX ADMIN — IPRATROPIUM BROMIDE AND ALBUTEROL SULFATE 3 ML: .5; 3 SOLUTION RESPIRATORY (INHALATION) at 14:56

## 2018-10-30 RX ADMIN — FAMOTIDINE 20 MG: 10 INJECTION INTRAVENOUS at 08:29

## 2018-10-30 RX ADMIN — HEPARIN SODIUM 5000 UNITS: 5000 INJECTION INTRAVENOUS; SUBCUTANEOUS at 21:40

## 2018-10-30 RX ADMIN — Medication 10 ML: at 05:36

## 2018-10-30 RX ADMIN — IPRATROPIUM BROMIDE AND ALBUTEROL SULFATE 3 ML: .5; 3 SOLUTION RESPIRATORY (INHALATION) at 20:12

## 2018-10-30 RX ADMIN — MEROPENEM 1 G: 1 INJECTION, POWDER, FOR SOLUTION INTRAVENOUS at 05:36

## 2018-10-30 RX ADMIN — ACETAMINOPHEN 650 MG: 325 SOLUTION ORAL at 21:41

## 2018-10-30 RX ADMIN — FAMOTIDINE 20 MG: 10 INJECTION INTRAVENOUS at 20:54

## 2018-10-30 RX ADMIN — ACETAMINOPHEN 650 MG: 325 SOLUTION ORAL at 17:21

## 2018-10-30 RX ADMIN — HEPARIN SODIUM 5000 UNITS: 5000 INJECTION INTRAVENOUS; SUBCUTANEOUS at 05:37

## 2018-10-30 RX ADMIN — HEPARIN SODIUM 5000 UNITS: 5000 INJECTION INTRAVENOUS; SUBCUTANEOUS at 14:10

## 2018-10-30 RX ADMIN — HYDROMORPHONE HYDROCHLORIDE 0.5 MG: 1 INJECTION, SOLUTION INTRAMUSCULAR; INTRAVENOUS; SUBCUTANEOUS at 08:35

## 2018-10-30 RX ADMIN — HYDROMORPHONE HYDROCHLORIDE 0.5 MG: 1 INJECTION, SOLUTION INTRAMUSCULAR; INTRAVENOUS; SUBCUTANEOUS at 03:58

## 2018-10-30 RX ADMIN — MEROPENEM 1 G: 1 INJECTION, POWDER, FOR SOLUTION INTRAVENOUS at 20:54

## 2018-10-30 RX ADMIN — MEROPENEM 1 G: 1 INJECTION, POWDER, FOR SOLUTION INTRAVENOUS at 13:01

## 2018-10-30 NOTE — PROGRESS NOTES
Bedside and Verbal shift change report given to Osiris Smith RN (oncoming nurse) by Demond Raya RN (offgoing nurse). Report included the following information SBAR, Kardex, MAR, Med Rec Status and Cardiac Rhythm NSR.

## 2018-10-30 NOTE — PROGRESS NOTES
Hospitalist Progress Note NAME: Tang Pryor :  1945 MRN:  [id number] Assessment / Plan: 
Acute pancreatitis  POA 
-Lipase > 3000 on admission, now down to WNL 
-Triglycerides normal. 
CT abd/pelvis:  Acute pancreatitis; possible developing necrosis in tail. Colonic diverticulosis, gallstones noted. U/S abd:  Cholelithiasis. No acute cholecystitis. Normal CBD. GI, Surgery consulted. No surgical intervention at this time. NPO on admission. Now advancing to clear liquids IV fluids Empiric IV abx. C/w  meropenem. Lipase improved to normal. WBC increased to 29 K. However, clinically improved. No fevers. Pain improved. Repeat CT abd/pelvis 10/29: 
1. Diffuse peripancreatic stranding and fluid consistent with acute 
pancreatitis. No abscess or drainable fluid collection. 2. Diminished enhancement in the neck of the pancreas consistent with pancreatic 
necrosis. 3. New bilateral pleural effusions and lower lobe atelectasis. 4. New ascites in the left lower quadrant and pelvis. 5. Atherosclerotic abdominal aorta without aneurysm. 6. Diverticulosis. CT reviewed by Surgery and GI. No surgical intervention at this time. Clear liquid diet Bilateral pleural effusions/pulm edema  
-likely due to volume overload. Asymptomatic. 
-decreased IV fluids. 
-Lasix 20 mg IV x 1 
-cont incentive spirometry Monitor closely one of the features of complicated pancreatitis is pleural effusion with high enzymatic content , I/O , weight daily Lactic acidosis 
-likely due to lower bp. Not septic at this time. No SIRS criteria. No fevers or hypothermia. RR and HR stable. Resolved. Leukocytosis 
-monitor, so far trending down, has enough pathology in the pancreas to justify WBC elevation, c/w Meropenem Prediabetes -A1c 6.1 HTN  
-hold oral meds PRN hydralazine Hypokalemia: replace and monitor. HLD- hold meds GERD 
DVT prophylaxis: Heparin Code status: Full code Surrogate Decision Maker:  GI Prophylaxis: pepcid Body mass index is 26.13 kg/m². Subjective: Chief Complaint / Reason for Physician Visit \"I feel better\" Review of Systems: 
Symptom Y/N Comments  Symptom Y/N Comments Fever/Chills    Chest Pain Poor Appetite    Edema Cough y   Abdominal Pain y Sputum    Joint Pain SOB/KLEIN    Pruritis/Rash Nausea/vomit    Tolerating PT/OT Diarrhea    Tolerating Diet y Constipation    Other Could NOT obtain due to:   
 
Objective: VITALS:  
Last 24hrs VS reviewed since prior progress note. Most recent are: 
Patient Vitals for the past 24 hrs: 
 Temp Pulse Resp BP SpO2  
10/30/18 0909 97.6 °F (36.4 °C) 71 18 128/52 94 % 10/30/18 0550 97.9 °F (36.6 °C) 66 18 113/77 97 % 10/30/18 0011 98.5 °F (36.9 °C) 66 16 121/54 91 % 10/29/18 1722 98.2 °F (36.8 °C) 67 17 113/49 93 % 10/29/18 1313 98.2 °F (36.8 °C) 76 16 113/68 90 % Intake/Output Summary (Last 24 hours) at 10/30/2018 1115 Last data filed at 10/30/2018 8135 Gross per 24 hour Intake 1481.67 ml Output 600 ml Net 881.67 ml PHYSICAL EXAM: 
General: WD, WN. Alert, cooperative, no acute distress   
EENT:  EOMI. Anicteric sclerae. MMM Resp:  Coarse BS 
CV:  Regular  rhythm,  No edema GI:  Soft, Non distended, minimal tenderness, no rebound.  +Bowel sounds Neurologic:  Alert and oriented X 3, normal speech, Psych:   Good insight. Not anxious nor agitated Skin:  No rashes. No jaundice Reviewed most current lab test results and cultures  YES Reviewed most current radiology test results   YES Review and summation of old records today    NO Reviewed patient's current orders and MAR    YES 
PMH/SH reviewed - no change compared to H&P 
________________________________________________________________________ Care Plan discussed with: 
  Comments Patient x Family RN x Care Manager Consultant Multidiciplinary team rounds were held today with , nursing, pharmacist and clinical coordinator. Patient's plan of care was discussed; medications were reviewed and discharge planning was addressed. ________________________________________________________________________ Total NON critical care TIME:  25   Minutes Total CRITICAL CARE TIME Spent:   Minutes non procedure based Comments >50% of visit spent in counseling and coordination of care y   
________________________________________________________________________ Jigar Cárdenas MD  
 
Procedures: see electronic medical records for all procedures/Xrays and details which were not copied into this note but were reviewed prior to creation of Plan. LABS: 
I reviewed today's most current labs and imaging studies. Pertinent labs include: 
Recent Labs 10/30/18 
3126 10/29/18 
0328 10/28/18 
1242 WBC 19.2* 25.2* 29.6* HGB 11.3* 11.7 12.5 HCT 36.2 37.2 38.6  269 306 Recent Labs 10/30/18 
3829 10/29/18 
0328 10/28/18 
1242  139 140  
K 3.4* 3.7 3.7  105 105 CO2 28 29 31 * 125* 142* BUN 21* 18 18 CREA 0.32* 0.30* 0.45* CA 7.7* 7.8* 8.0* ALB 2.3* 2.3* 2.5* TBILI 0.7 1.0 0.7 SGOT 27 30 38* ALT 43 45 59 Signed: Jigar Cárdenas MD

## 2018-10-30 NOTE — PROGRESS NOTES
Admit Date: 10/26/2018 POD * No surgery found * Procedure:  * No surgery found * Subjective:  
 
Patient feeling better overall. Nausea resolved. Some burping. Objective:  
 
Blood pressure 128/52, pulse 71, temperature 97.6 °F (36.4 °C), resp. rate 18, height 5' 2\" (1.575 m), weight 142 lb 13.7 oz (64.8 kg), SpO2 94 %. Temp (24hrs), Av.1 °F (36.7 °C), Min:97.6 °F (36.4 °C), Max:98.5 °F (36.9 °C) Physical Exam:  GENERAL: alert, cooperative, no distress, appears stated age, LUNG: clear to auscultation bilaterally, HEART: regular rate and rhythm, ABDOMEN: soft, non-tender. Bowel sounds normal. No masses,  no organomegaly, EXTREMITIES:  extremities normal, atraumatic, no cyanosis or edema Labs:  
Recent Results (from the past 24 hour(s)) GLUCOSE, POC Collection Time: 10/29/18  1:18 PM  
Result Value Ref Range Glucose (POC) 122 (H) 65 - 100 mg/dL Performed by Zacarias Graf (PCT) GLUCOSE, POC Collection Time: 10/29/18  5:09 PM  
Result Value Ref Range Glucose (POC) 120 (H) 65 - 100 mg/dL Performed by Ifeoma Pedraza (PCT) GLUCOSE, POC Collection Time: 10/30/18 12:30 AM  
Result Value Ref Range Glucose (POC) 110 (H) 65 - 100 mg/dL Performed by Dioni Seymour LIPASE Collection Time: 10/30/18  5:48 AM  
Result Value Ref Range Lipase 129 73 - 393 U/L  
CBC WITH AUTOMATED DIFF Collection Time: 10/30/18  5:48 AM  
Result Value Ref Range WBC 19.2 (H) 3.6 - 11.0 K/uL  
 RBC 3.73 (L) 3.80 - 5.20 M/uL  
 HGB 11.3 (L) 11.5 - 16.0 g/dL HCT 36.2 35.0 - 47.0 % MCV 97.1 80.0 - 99.0 FL  
 MCH 30.3 26.0 - 34.0 PG  
 MCHC 31.2 30.0 - 36.5 g/dL  
 RDW 13.2 11.5 - 14.5 % PLATELET 268 902 - 841 K/uL MPV 10.2 8.9 - 12.9 FL  
 NRBC 0.0 0  WBC ABSOLUTE NRBC 0.00 0.00 - 0.01 K/uL NEUTROPHILS 81 (H) 32 - 75 % LYMPHOCYTES 11 (L) 12 - 49 % MONOCYTES 7 5 - 13 % EOSINOPHILS 0 0 - 7 % BASOPHILS 0 0 - 1 % IMMATURE GRANULOCYTES 1 (H) 0.0 - 0.5 % ABS. NEUTROPHILS 15.6 (H) 1.8 - 8.0 K/UL  
 ABS. LYMPHOCYTES 2.1 0.8 - 3.5 K/UL  
 ABS. MONOCYTES 1.3 (H) 0.0 - 1.0 K/UL  
 ABS. EOSINOPHILS 0.0 0.0 - 0.4 K/UL  
 ABS. BASOPHILS 0.0 0.0 - 0.1 K/UL  
 ABS. IMM. GRANS. 0.1 (H) 0.00 - 0.04 K/UL  
 DF AUTOMATED METABOLIC PANEL, COMPREHENSIVE Collection Time: 10/30/18  5:48 AM  
Result Value Ref Range Sodium 139 136 - 145 mmol/L Potassium 3.4 (L) 3.5 - 5.1 mmol/L Chloride 104 97 - 108 mmol/L  
 CO2 28 21 - 32 mmol/L Anion gap 7 5 - 15 mmol/L Glucose 112 (H) 65 - 100 mg/dL BUN 21 (H) 6 - 20 MG/DL Creatinine 0.32 (L) 0.55 - 1.02 MG/DL  
 BUN/Creatinine ratio 66 (H) 12 - 20 GFR est AA >60 >60 ml/min/1.73m2 GFR est non-AA >60 >60 ml/min/1.73m2 Calcium 7.7 (L) 8.5 - 10.1 MG/DL Bilirubin, total 0.7 0.2 - 1.0 MG/DL  
 ALT (SGPT) 43 12 - 78 U/L  
 AST (SGOT) 27 15 - 37 U/L Alk. phosphatase 89 45 - 117 U/L Protein, total 6.2 (L) 6.4 - 8.2 g/dL Albumin 2.3 (L) 3.5 - 5.0 g/dL Globulin 3.9 2.0 - 4.0 g/dL A-G Ratio 0.6 (L) 1.1 - 2.2 GLUCOSE, POC Collection Time: 10/30/18  8:32 AM  
Result Value Ref Range Glucose (POC) 114 (H) 65 - 100 mg/dL Performed by Joleen Reza (PCT) Data Review images and reports reviewed Assessment:  
 
Active Problems: 
  Gall stones (10/26/2018) Pancreatitis (10/26/2018) Plan/Recommendations/Medical Decision Making: CT reviewed. Significant persistent pancreatitis with concern for pancreatic necrosis. High risk for complications from cholecystectomy in the acute setting. Lipase completely normalized now. Recommend continue medical management and outpatient follow up for elective cholecystectomy in no less than six weeks from now. No indication for percutaneous cholecystostomy. Trial of clear liquid diet Jenn Richardson. Desiree Cooper MD, [address] [facility]

## 2018-10-31 LAB
ALBUMIN SERPL-MCNC: 2.2 G/DL (ref 3.5–5)
ALBUMIN/GLOB SERPL: 0.6 {RATIO} (ref 1.1–2.2)
ALP SERPL-CCNC: 107 U/L (ref 45–117)
ALT SERPL-CCNC: 69 U/L (ref 12–78)
ANION GAP SERPL CALC-SCNC: 4 MMOL/L (ref 5–15)
AST SERPL-CCNC: 62 U/L (ref 15–37)
BASOPHILS # BLD: 0 K/UL (ref 0–0.1)
BASOPHILS NFR BLD: 0 % (ref 0–1)
BILIRUB SERPL-MCNC: 0.7 MG/DL (ref 0.2–1)
BUN SERPL-MCNC: 17 MG/DL (ref 6–20)
BUN/CREAT SERPL: 46 (ref 12–20)
CALCIUM SERPL-MCNC: 7.5 MG/DL (ref 8.5–10.1)
CHLORIDE SERPL-SCNC: 104 MMOL/L (ref 97–108)
CO2 SERPL-SCNC: 31 MMOL/L (ref 21–32)
CREAT SERPL-MCNC: 0.37 MG/DL (ref 0.55–1.02)
DIFFERENTIAL METHOD BLD: ABNORMAL
EOSINOPHIL # BLD: 0.1 K/UL (ref 0–0.4)
EOSINOPHIL NFR BLD: 1 % (ref 0–7)
ERYTHROCYTE [DISTWIDTH] IN BLOOD BY AUTOMATED COUNT: 13.2 % (ref 11.5–14.5)
GLOBULIN SER CALC-MCNC: 3.5 G/DL (ref 2–4)
GLUCOSE BLD STRIP.AUTO-MCNC: 139 MG/DL (ref 65–100)
GLUCOSE BLD STRIP.AUTO-MCNC: 143 MG/DL (ref 65–100)
GLUCOSE BLD STRIP.AUTO-MCNC: 149 MG/DL (ref 65–100)
GLUCOSE BLD STRIP.AUTO-MCNC: 185 MG/DL (ref 65–100)
GLUCOSE SERPL-MCNC: 157 MG/DL (ref 65–100)
HCT VFR BLD AUTO: 32.5 % (ref 35–47)
HGB BLD-MCNC: 10.5 G/DL (ref 11.5–16)
IMM GRANULOCYTES # BLD: 0.2 K/UL (ref 0–0.04)
IMM GRANULOCYTES NFR BLD AUTO: 1 % (ref 0–0.5)
LIPASE SERPL-CCNC: 94 U/L (ref 73–393)
LYMPHOCYTES # BLD: 1.7 K/UL (ref 0.8–3.5)
LYMPHOCYTES NFR BLD: 10 % (ref 12–49)
MAGNESIUM SERPL-MCNC: 2.2 MG/DL (ref 1.6–2.4)
MCH RBC QN AUTO: 30.6 PG (ref 26–34)
MCHC RBC AUTO-ENTMCNC: 32.3 G/DL (ref 30–36.5)
MCV RBC AUTO: 94.8 FL (ref 80–99)
MONOCYTES # BLD: 1.5 K/UL (ref 0–1)
MONOCYTES NFR BLD: 9 % (ref 5–13)
NEUTS SEG # BLD: 13.5 K/UL (ref 1.8–8)
NEUTS SEG NFR BLD: 80 % (ref 32–75)
NRBC # BLD: 0 K/UL (ref 0–0.01)
NRBC BLD-RTO: 0 PER 100 WBC
PLATELET # BLD AUTO: 289 K/UL (ref 150–400)
PMV BLD AUTO: 10 FL (ref 8.9–12.9)
POTASSIUM SERPL-SCNC: 3.3 MMOL/L (ref 3.5–5.1)
PROT SERPL-MCNC: 5.7 G/DL (ref 6.4–8.2)
RBC # BLD AUTO: 3.43 M/UL (ref 3.8–5.2)
SERVICE CMNT-IMP: ABNORMAL
SODIUM SERPL-SCNC: 139 MMOL/L (ref 136–145)
WBC # BLD AUTO: 16.9 K/UL (ref 3.6–11)

## 2018-10-31 PROCEDURE — 74011000258 HC RX REV CODE- 258: Performed by: FAMILY MEDICINE

## 2018-10-31 PROCEDURE — 85025 COMPLETE CBC W/AUTO DIFF WBC: CPT | Performed by: INTERNAL MEDICINE

## 2018-10-31 PROCEDURE — 99232 SBSQ HOSP IP/OBS MODERATE 35: CPT | Performed by: SURGERY

## 2018-10-31 PROCEDURE — 82962 GLUCOSE BLOOD TEST: CPT

## 2018-10-31 PROCEDURE — 87177 OVA AND PARASITES SMEARS: CPT | Performed by: INTERNAL MEDICINE

## 2018-10-31 PROCEDURE — 74011000258 HC RX REV CODE- 258: Performed by: INTERNAL MEDICINE

## 2018-10-31 PROCEDURE — 74011000250 HC RX REV CODE- 250: Performed by: INTERNAL MEDICINE

## 2018-10-31 PROCEDURE — 74011250636 HC RX REV CODE- 250/636: Performed by: INTERNAL MEDICINE

## 2018-10-31 PROCEDURE — 74011250637 HC RX REV CODE- 250/637: Performed by: INTERNAL MEDICINE

## 2018-10-31 PROCEDURE — 94640 AIRWAY INHALATION TREATMENT: CPT

## 2018-10-31 PROCEDURE — 83690 ASSAY OF LIPASE: CPT | Performed by: INTERNAL MEDICINE

## 2018-10-31 PROCEDURE — 87045 FECES CULTURE AEROBIC BACT: CPT | Performed by: INTERNAL MEDICINE

## 2018-10-31 PROCEDURE — 80053 COMPREHEN METABOLIC PANEL: CPT | Performed by: INTERNAL MEDICINE

## 2018-10-31 PROCEDURE — 74011250636 HC RX REV CODE- 250/636: Performed by: FAMILY MEDICINE

## 2018-10-31 PROCEDURE — 65270000029 HC RM PRIVATE

## 2018-10-31 PROCEDURE — 36415 COLL VENOUS BLD VENIPUNCTURE: CPT | Performed by: INTERNAL MEDICINE

## 2018-10-31 PROCEDURE — 83735 ASSAY OF MAGNESIUM: CPT | Performed by: INTERNAL MEDICINE

## 2018-10-31 PROCEDURE — 74011636637 HC RX REV CODE- 636/637: Performed by: INTERNAL MEDICINE

## 2018-10-31 RX ORDER — POTASSIUM CHLORIDE 20 MEQ/1
20 TABLET, EXTENDED RELEASE ORAL
Status: COMPLETED | OUTPATIENT
Start: 2018-10-31 | End: 2018-10-31

## 2018-10-31 RX ORDER — FUROSEMIDE 10 MG/ML
20 INJECTION INTRAMUSCULAR; INTRAVENOUS ONCE
Status: COMPLETED | OUTPATIENT
Start: 2018-10-31 | End: 2018-10-31

## 2018-10-31 RX ORDER — FAMOTIDINE 20 MG/1
20 TABLET, FILM COATED ORAL 2 TIMES DAILY
Status: DISCONTINUED | OUTPATIENT
Start: 2018-10-31 | End: 2018-11-03 | Stop reason: HOSPADM

## 2018-10-31 RX ADMIN — Medication 10 ML: at 06:30

## 2018-10-31 RX ADMIN — MEROPENEM 1 G: 1 INJECTION, POWDER, FOR SOLUTION INTRAVENOUS at 06:25

## 2018-10-31 RX ADMIN — FAMOTIDINE 20 MG: 20 TABLET ORAL at 09:42

## 2018-10-31 RX ADMIN — FAMOTIDINE 20 MG: 20 TABLET ORAL at 18:15

## 2018-10-31 RX ADMIN — POTASSIUM CHLORIDE 20 MEQ: 20 TABLET, EXTENDED RELEASE ORAL at 09:42

## 2018-10-31 RX ADMIN — IPRATROPIUM BROMIDE AND ALBUTEROL SULFATE 3 ML: .5; 3 SOLUTION RESPIRATORY (INHALATION) at 20:59

## 2018-10-31 RX ADMIN — INSULIN LISPRO 2 UNITS: 100 INJECTION, SOLUTION INTRAVENOUS; SUBCUTANEOUS at 18:15

## 2018-10-31 RX ADMIN — MEROPENEM 1 G: 1 INJECTION, POWDER, FOR SOLUTION INTRAVENOUS at 21:40

## 2018-10-31 RX ADMIN — ACETAMINOPHEN 650 MG: 325 SOLUTION ORAL at 09:41

## 2018-10-31 RX ADMIN — ACETAMINOPHEN 650 MG: 325 SOLUTION ORAL at 04:12

## 2018-10-31 RX ADMIN — MEROPENEM 1 G: 1 INJECTION, POWDER, FOR SOLUTION INTRAVENOUS at 13:32

## 2018-10-31 RX ADMIN — IPRATROPIUM BROMIDE AND ALBUTEROL SULFATE 3 ML: .5; 3 SOLUTION RESPIRATORY (INHALATION) at 08:42

## 2018-10-31 RX ADMIN — INSULIN LISPRO 2 UNITS: 100 INJECTION, SOLUTION INTRAVENOUS; SUBCUTANEOUS at 21:39

## 2018-10-31 RX ADMIN — Medication 10 ML: at 23:58

## 2018-10-31 RX ADMIN — IPRATROPIUM BROMIDE AND ALBUTEROL SULFATE 3 ML: .5; 3 SOLUTION RESPIRATORY (INHALATION) at 13:57

## 2018-10-31 RX ADMIN — HEPARIN SODIUM 5000 UNITS: 5000 INJECTION INTRAVENOUS; SUBCUTANEOUS at 21:41

## 2018-10-31 RX ADMIN — HEPARIN SODIUM 5000 UNITS: 5000 INJECTION INTRAVENOUS; SUBCUTANEOUS at 13:33

## 2018-10-31 RX ADMIN — ACETAMINOPHEN 650 MG: 325 SOLUTION ORAL at 18:15

## 2018-10-31 RX ADMIN — SODIUM CHLORIDE 50 ML/HR: 900 INJECTION, SOLUTION INTRAVENOUS at 13:38

## 2018-10-31 RX ADMIN — HEPARIN SODIUM 5000 UNITS: 5000 INJECTION INTRAVENOUS; SUBCUTANEOUS at 06:27

## 2018-10-31 RX ADMIN — FUROSEMIDE 20 MG: 10 INJECTION, SOLUTION INTRAMUSCULAR; INTRAVENOUS at 09:42

## 2018-10-31 RX ADMIN — ACETAMINOPHEN 650 MG: 325 SOLUTION ORAL at 23:58

## 2018-10-31 NOTE — CDMP QUERY
Please clarify if this patient is (was) being treated/managed for:  
 
=> Acute pulmonary edema due to volume overload requiring IV lasix and Incentive Spirometry. 
=> Other explanation of clinical findings 
=> Clinically Undetermined (no explanation for clinical findings) The medical record reflects the following clinical findings, treatment, and risk factors. Risk Factors:  IVF NS Bolus 1,000 ml x2 in ED; IVF NS Infusion@ 100 ml/hr in ED Clinical Indicators:  CT ABD/PELV: New bilateral pleural effusions and lower lobe atelectasis;  
Treatment: Decreased IV Fluids/ Lasix 20mg IV x1; Cont Incentive Spirometry Please clarify and document your clinical opinion in the progress notes and discharge summary including the definitive and/or presumptive diagnosis, (suspected or probable), related to the above clinical findings. Please include clinical findings supporting your diagnosis. Thank Alice Cesar [facility] 
[phone]

## 2018-10-31 NOTE — PROGRESS NOTES
Hospitalist Progress Note NAME: Leonardo Quinteros :  1945 MRN:  [id number] Assessment / Plan: 
Acute pancreatitis  POA 
-Lipase > 3000 on admission, now down to WNL 
-Triglycerides normal. 
CT abd/pelvis:  Acute pancreatitis; possible developing necrosis in tail. Colonic diverticulosis, gallstones noted. U/S abd:  Cholelithiasis. No acute cholecystitis. Normal CBD. GI, Surgery consulted. No surgical intervention at this time. So far tolerating clear liquids will advance to full liquids, Lipase is normal 
IV fluids Empiric IV abx. C/w  meropenem. Lipase improved to normal. clinically improved. No fevers. Pain improved. Repeat CT abd/pelvis 10/29: 
1. Diffuse peripancreatic stranding and fluid consistent with acute 
pancreatitis. No abscess or drainable fluid collection. 2. Diminished enhancement in the neck of the pancreas consistent with pancreatic 
necrosis. 3. New bilateral pleural effusions and lower lobe atelectasis. 4. New ascites in the left lower quadrant and pelvis. 5. Atherosclerotic abdominal aorta without aneurysm. 6. Diverticulosis. CT reviewed by Surgery and GI. No surgical intervention at this time. Clear liquid diet Bilateral pleural effusions/pulm edema  
-likely due to volume overload. Asymptomatic. 
-decreased IV fluids. 
-Lasix 20 mg IV today 
-cont incentive spirometry Monitor closely one of the features of complicated pancreatitis is pleural effusion with high enzymatic content , I/O , weight daily Lactic acidosis 
-likely due to lower bp. Not septic at this time. No SIRS criteria. No fevers or hypothermia. RR and HR stable. Resolved. Leukocytosis 
-monitor, so far trending down, has enough pathology in the pancreas to justify WBC elevation, c/w Meropenem Prediabetes -A1c 6.1 HTN  
-hold oral meds PRN hydralazine Hypokalemia: replace and monitor. HLD- hold meds GERD 
DVT prophylaxis: Heparin Code status: Full code Surrogate Decision Maker:  GI Prophylaxis: pepcid Body mass index is 30.73 kg/m². Subjective: Chief Complaint / Reason for Physician Visit \"I feel all right\" Review of Systems: 
Symptom Y/N Comments  Symptom Y/N Comments Fever/Chills    Chest Pain Poor Appetite    Edema Cough y   Abdominal Pain y Sputum    Joint Pain SOB/KLEIN    Pruritis/Rash Nausea/vomit    Tolerating PT/OT Diarrhea    Tolerating Diet y Constipation    Other Could NOT obtain due to:   
 
Objective: VITALS:  
Last 24hrs VS reviewed since prior progress note. Most recent are: 
Patient Vitals for the past 24 hrs: 
 Temp Pulse Resp BP SpO2  
10/31/18 0714 98.2 °F (36.8 °C) 65 17 134/61 94 % 10/31/18 0406 98.4 °F (36.9 °C) 66 17 130/48 93 % 10/31/18 0009 98.3 °F (36.8 °C) 68 16 128/59 93 % 10/30/18 2019 98.1 °F (36.7 °C) 69 16 124/50 95 % 10/30/18 2011     92 % 10/30/18 1554 99.6 °F (37.6 °C) 65 16 128/50 92 % 10/30/18 1457     93 % 10/30/18 0909 97.6 °F (36.4 °C) 71 18 128/52 94 % Intake/Output Summary (Last 24 hours) at 10/31/2018 7352 Last data filed at 10/31/2018 0009 Gross per 24 hour Intake 762.5 ml Output 450 ml Net 312.5 ml PHYSICAL EXAM: 
General: WD, WN. Alert, cooperative, no acute distress   
EENT:  EOMI. Anicteric sclerae. MMM Resp:  Coarse BS 
CV:  Regular  rhythm,  No edema GI:  Soft, Non distended, minimal tenderness, no rebound.  +Bowel sounds Neurologic:  Alert and oriented X 3, normal speech, Psych:   Good insight. Not anxious nor agitated Skin:  No rashes. No jaundice Reviewed most current lab test results and cultures  YES Reviewed most current radiology test results   YES Review and summation of old records today    NO Reviewed patient's current orders and MAR    YES 
PMH/SH reviewed - no change compared to H&P 
________________________________________________________________________ Care Plan discussed with: 
 Comments Patient x Family RN x Care Manager Consultant Multidiciplinary team rounds were held today with , nursing, pharmacist and clinical coordinator. Patient's plan of care was discussed; medications were reviewed and discharge planning was addressed. ________________________________________________________________________ Total NON critical care TIME:  25   Minutes Total CRITICAL CARE TIME Spent:   Minutes non procedure based Comments >50% of visit spent in counseling and coordination of care y   
________________________________________________________________________ Jimmy Dave MD  
 
Procedures: see electronic medical records for all procedures/Xrays and details which were not copied into this note but were reviewed prior to creation of Plan. LABS: 
I reviewed today's most current labs and imaging studies. Pertinent labs include: 
Recent Labs 10/31/18 
6058 10/30/18 
8639 10/29/18 
0688 WBC 16.9* 19.2* 25.2* HGB 10.5* 11.3* 11.7 HCT 32.5* 36.2 37.2  274 269 Recent Labs 10/31/18 
6107 10/30/18 
6831 10/29/18 
6842  139 139  
K 3.3* 3.4* 3.7  104 105 CO2 31 28 29 * 112* 125* BUN 17 21* 18  
CREA 0.37* 0.32* 0.30* CA 7.5* 7.7* 7.8*  
MG 2.2  --   --   
ALB 2.2* 2.3* 2.3* TBILI 0.7 0.7 1.0 SGOT 62* 27 30 ALT 69 43 45 Signed: Jimmy Dave MD

## 2018-10-31 NOTE — PROGRESS NOTES
Admit Date: 10/26/2018 POD * No surgery found * Procedure:  * No surgery found * Subjective:  
 
Patient feeling better overall. Nausea resolved. [place]rell clears. Objective:  
 
Blood pressure 134/61, pulse 65, temperature 98.2 °F (36.8 °C), resp. rate 17, height 5' 2\" (1.575 m), weight 167 lb 15.9 oz (76.2 kg), SpO2 94 %. Temp (24hrs), Av.4 °F (36.9 °C), Min:97.6 °F (36.4 °C), Max:99.6 °F (37.6 °C) Physical Exam:  GENERAL: alert, cooperative, no distress, appears stated age, LUNG: clear to auscultation bilaterally, HEART: regular rate and rhythm, ABDOMEN: soft, non-tender. Bowel sounds normal. No masses,  no organomegaly, EXTREMITIES:  extremities normal, atraumatic, no cyanosis or edema Labs:  
Recent Results (from the past 24 hour(s)) GLUCOSE, POC Collection Time: 10/30/18  8:32 AM  
Result Value Ref Range Glucose (POC) 114 (H) 65 - 100 mg/dL Performed by Karime Harrison (PCT) GLUCOSE, POC Collection Time: 10/30/18 12:53 PM  
Result Value Ref Range Glucose (POC) 104 (H) 65 - 100 mg/dL Performed by Karime Harrison ([facility]CT) GLUCOSE, POC Collection Time: 10/30/18  4:46 PM  
Result Value Ref Range Glucose (POC) 123 (H) 65 - 100 mg/dL Performed by Lupe Mahajan ([facility]) GLUCOSE, POC Collection Time: 10/30/18  9:44 PM  
Result Value Ref Range Glucose (POC) 153 (H) 65 - 100 mg/dL Performed by Lupe Mahajan ([facility]) LIPASE Collection Time: 10/31/18  4:16 AM  
Result Value Ref Range Lipase 94 73 - 393 U/L  
CBC WITH AUTOMATED DIFF Collection Time: 10/31/18  4:16 AM  
Result Value Ref Range WBC 16.9 (H) 3.6 - 11.0 K/uL  
 RBC 3.43 (L) 3.80 - 5.20 M/uL  
 HGB 10.5 (L) 11.5 - 16.0 g/dL HCT 32.5 (L) 35.0 - 47.0 % MCV 94.8 80.0 - 99.0 FL  
 MCH 30.6 26.0 - 34.0 PG  
 MCHC 32.3 30.0 - 36.5 g/dL  
 RDW 13.2 11.5 - 14.5 % PLATELET 207 155 - 861 K/uL MPV 10.0 8.9 - 12.9 FL  
 NRBC 0.0 0  WBC ABSOLUTE NRBC 0.00 0.00 - 0.01 K/uL NEUTROPHILS 80 (H) 32 - 75 % LYMPHOCYTES 10 (L) 12 - 49 % MONOCYTES 9 5 - 13 % EOSINOPHILS 1 0 - 7 % BASOPHILS 0 0 - 1 % IMMATURE GRANULOCYTES 1 (H) 0.0 - 0.5 % ABS. NEUTROPHILS 13.5 (H) 1.8 - 8.0 K/UL  
 ABS. LYMPHOCYTES 1.7 0.8 - 3.5 K/UL  
 ABS. MONOCYTES 1.5 (H) 0.0 - 1.0 K/UL  
 ABS. EOSINOPHILS 0.1 0.0 - 0.4 K/UL  
 ABS. BASOPHILS 0.0 0.0 - 0.1 K/UL  
 ABS. IMM. GRANS. 0.2 (H) 0.00 - 0.04 K/UL  
 DF AUTOMATED MAGNESIUM Collection Time: 10/31/18  4:16 AM  
Result Value Ref Range Magnesium 2.2 1.6 - 2.4 mg/dL METABOLIC PANEL, COMPREHENSIVE Collection Time: 10/31/18  4:16 AM  
Result Value Ref Range Sodium 139 136 - 145 mmol/L Potassium 3.3 (L) 3.5 - 5.1 mmol/L Chloride 104 97 - 108 mmol/L  
 CO2 31 21 - 32 mmol/L Anion gap 4 (L) 5 - 15 mmol/L Glucose 157 (H) 65 - 100 mg/dL BUN 17 6 - 20 MG/DL Creatinine 0.37 (L) 0.55 - 1.02 MG/DL  
 BUN/Creatinine ratio 46 (H) 12 - 20 GFR est AA >60 >60 ml/min/1.73m2 GFR est non-AA >60 >60 ml/min/1.73m2 Calcium 7.5 (L) 8.5 - 10.1 MG/DL Bilirubin, total 0.7 0.2 - 1.0 MG/DL  
 ALT (SGPT) 69 12 - 78 U/L  
 AST (SGOT) 62 (H) 15 - 37 U/L Alk. phosphatase 107 45 - 117 U/L Protein, total 5.7 (L) 6.4 - 8.2 g/dL Albumin 2.2 (L) 3.5 - 5.0 g/dL Globulin 3.5 2.0 - 4.0 g/dL A-G Ratio 0.6 (L) 1.1 - 2.2 Data Review images and reports reviewed Assessment:  
 
Active Problems: 
  Gall stones (10/26/2018) Pancreatitis (10/26/2018) Plan/Recommendations/Medical Decision Making: CT reviewed. Significant persistent pancreatitis with concern for pancreatic necrosis. High risk for complications from cholecystectomy in the acute setting. Lipase completely normalized now. Recommend continue medical management and outpatient follow up for elective cholecystectomy in no less than six weeks from now. No indication for percutaneous cholecystostomy.   Advance diet per primary team.  Will follow peripherally. Ramila Pryor. Rona Robb MD, [facility] [address] [facility]

## 2018-10-31 NOTE — PROGRESS NOTES
Bedside and Verbal shift change report given to Honey Mccollum RN (oncoming nurse) by Constantino Kamara RN (offgoing nurse). Report included the following information SBAR, Kardex, MAR and Med Rec Status.

## 2018-10-31 NOTE — PROGRESS NOTES
GI PROGRESS NOTE 
 
NAME:             Denise Church :              1945 MRN:              [id number] Admit Date:     10/26/2018 Todays Date:  10/31/2018 Subjective:  
 
          
Abdominal pain: improved Nausea: none Vomiting:no 
 
+ flatus and a small Bm. Tolerated CLD. Some pain on eating. Mild SOB. 
  
Review of Systems - Respiratory ROS: no cough,  or wheezing Cardiovascular ROS: no chest pain or dyspnea on exertion Medications-reviewed Current Facility-Administered Medications Medication Dose Route Frequency  potassium chloride (K-DUR, KLOR-CON) SR tablet 20 mEq  20 mEq Oral NOW  albuterol-ipratropium (DUO-NEB) 2.5 MG-0.5 MG/3 ML  3 mL Nebulization TID RT  
 0.9% sodium chloride infusion  50 mL/hr IntraVENous CONTINUOUS  
 albuterol (PROVENTIL VENTOLIN) nebulizer solution 2.5 mg  2.5 mg Nebulization Q4H PRN  
 famotidine (PF) (PEPCID) 20 mg in sodium chloride 0.9% 10 mL injection  20 mg IntraVENous Q12H  
 glucose chewable tablet 16 g  4 Tab Oral PRN  
 dextrose (D50W) injection syrg 12.5-25 g  25-50 mL IntraVENous PRN  
 glucagon (GLUCAGEN) injection 1 mg  1 mg IntraMUSCular PRN  
 insulin lispro (HUMALOG) injection   SubCUTAneous AC&HS  sodium chloride (NS) flush 5-10 mL  5-10 mL IntraVENous Q8H  
 sodium chloride (NS) flush 5-10 mL  5-10 mL IntraVENous PRN  
 HYDROmorphone (PF) (DILAUDID) injection 0.5 mg  0.5 mg IntraVENous Q4H PRN  
 heparin (porcine) injection 5,000 Units  5,000 Units SubCUTAneous Q8H  
 ondansetron (ZOFRAN) injection 4 mg  4 mg IntraVENous Q4H PRN  
 acetaminophen (TYLENOL) solution 650 mg  650 mg Oral Q6H PRN  promethazine (PHENERGAN) 12.5 mg in 0.9% sodium chloride 50 mL IVPB  12.5 mg IntraVENous Q6H PRN  
 meropenem (MERREM) 1 g in 0.9% sodium chloride (MBP/ADV) 50 mL  1 g IntraVENous Q8H Objective:  
 
Patient Vitals for the past 8 hrs: 
 BP Temp Pulse Resp SpO2 10/31/18 0714 134/61 98.2 °F (36.8 °C) 65 17 94 % 10/31/18 0406 130/48 98.4 °F (36.9 °C) 66 17 93 % 10/31/18 0009 128/59 98.3 °F (36.8 °C) 68 16 93 % No intake/output data recorded. 10/29 1901 - 10/31 0700 In: 1623.8 [I.V.:1623.8] Out: 450 [Urine:450] EXAM:   
Visit Vitals /61 (BP 1 Location: Left arm, BP Patient Position: At rest) Pulse 65 Temp 98.2 °F (36.8 °C) Resp 17 Ht 5' 2\" (1.575 m) Wt 76.2 kg (167 lb 15.9 oz) SpO2 94% BMI 30.73 kg/m² General appearance: alert, cooperative, no distress, appears stated age Lungs: clear to auscultation bilaterally Heart: regular rate and rhythm, S1, S2 normal, no murmur, click, rub or gallop Abdomen: soft, mildlytender in epigastriium. Bowel sounds hypoactive. No masses,  no organomegaly Data Review Recent Labs 10/31/18 
0416 10/30/18 
8039 WBC 16.9* 19.2* HGB 10.5* 11.3* HCT 32.5* 36.2  274 Recent Labs 10/31/18 
0416 10/30/18 
2714  139  
K 3.3* 3.4*  
 104 CO2 31 28 BUN 17 21* CREA 0.37* 0.32* * 112* CA 7.5* 7.7* Recent Labs 10/31/18 
0416 10/30/18 
3712 SGOT 62* 27  
 89  
TP 5.7* 6.2* ALB 2.2* 2.3*  
GLOB 3.5 3.9 LPSE 94 129 Assessment:  
Severe  acute pancreatitis Pancreatic necrosis Leucocytosis Nausea,vomiting and pain- better Gallstones Active Problems: 
  Gall stones (10/26/2018) Pancreatitis (10/26/2018) Plan:  
· Started on clear liquids and has done well. Some pain. Slow diet advancement suggested. · WBC decreasing, lipase has normalized. · IV PPI and DVT prophylaxis. · We will see if she can go home this week. Cautiously optimistic. I did not promise her a discharge date.  
  
 
 
Anil Aldridge MD

## 2018-11-01 LAB
ALBUMIN SERPL-MCNC: 2.2 G/DL (ref 3.5–5)
ALBUMIN/GLOB SERPL: 0.6 {RATIO} (ref 1.1–2.2)
ALP SERPL-CCNC: 118 U/L (ref 45–117)
ALT SERPL-CCNC: 87 U/L (ref 12–78)
ANION GAP SERPL CALC-SCNC: 8 MMOL/L (ref 5–15)
AST SERPL-CCNC: 58 U/L (ref 15–37)
BASOPHILS # BLD: 0 K/UL (ref 0–0.1)
BASOPHILS NFR BLD: 0 % (ref 0–1)
BILIRUB SERPL-MCNC: 0.7 MG/DL (ref 0.2–1)
BUN SERPL-MCNC: 8 MG/DL (ref 6–20)
BUN/CREAT SERPL: 25 (ref 12–20)
CALCIUM SERPL-MCNC: 7.6 MG/DL (ref 8.5–10.1)
CHLORIDE SERPL-SCNC: 103 MMOL/L (ref 97–108)
CO2 SERPL-SCNC: 28 MMOL/L (ref 21–32)
CREAT SERPL-MCNC: 0.32 MG/DL (ref 0.55–1.02)
DIFFERENTIAL METHOD BLD: ABNORMAL
EOSINOPHIL # BLD: 0.1 K/UL (ref 0–0.4)
EOSINOPHIL NFR BLD: 1 % (ref 0–7)
ERYTHROCYTE [DISTWIDTH] IN BLOOD BY AUTOMATED COUNT: 13.2 % (ref 11.5–14.5)
GLOBULIN SER CALC-MCNC: 3.8 G/DL (ref 2–4)
GLUCOSE BLD STRIP.AUTO-MCNC: 130 MG/DL (ref 65–100)
GLUCOSE BLD STRIP.AUTO-MCNC: 130 MG/DL (ref 65–100)
GLUCOSE BLD STRIP.AUTO-MCNC: 144 MG/DL (ref 65–100)
GLUCOSE SERPL-MCNC: 124 MG/DL (ref 65–100)
HCT VFR BLD AUTO: 33.5 % (ref 35–47)
HGB BLD-MCNC: 10.9 G/DL (ref 11.5–16)
IMM GRANULOCYTES # BLD: 0.2 K/UL (ref 0–0.04)
IMM GRANULOCYTES NFR BLD AUTO: 1 % (ref 0–0.5)
LIPASE SERPL-CCNC: 67 U/L (ref 73–393)
LYMPHOCYTES # BLD: 1.8 K/UL (ref 0.8–3.5)
LYMPHOCYTES NFR BLD: 10 % (ref 12–49)
MAGNESIUM SERPL-MCNC: 2.1 MG/DL (ref 1.6–2.4)
MCH RBC QN AUTO: 30.3 PG (ref 26–34)
MCHC RBC AUTO-ENTMCNC: 32.5 G/DL (ref 30–36.5)
MCV RBC AUTO: 93.1 FL (ref 80–99)
MONOCYTES # BLD: 1.4 K/UL (ref 0–1)
MONOCYTES NFR BLD: 8 % (ref 5–13)
NEUTS SEG # BLD: 14.1 K/UL (ref 1.8–8)
NEUTS SEG NFR BLD: 80 % (ref 32–75)
NRBC # BLD: 0 K/UL (ref 0–0.01)
NRBC BLD-RTO: 0 PER 100 WBC
PLATELET # BLD AUTO: 344 K/UL (ref 150–400)
PMV BLD AUTO: 9.9 FL (ref 8.9–12.9)
POTASSIUM SERPL-SCNC: 2.8 MMOL/L (ref 3.5–5.1)
PROT SERPL-MCNC: 6 G/DL (ref 6.4–8.2)
RBC # BLD AUTO: 3.6 M/UL (ref 3.8–5.2)
SERVICE CMNT-IMP: ABNORMAL
SODIUM SERPL-SCNC: 139 MMOL/L (ref 136–145)
WBC # BLD AUTO: 17.7 K/UL (ref 3.6–11)

## 2018-11-01 PROCEDURE — 74011250636 HC RX REV CODE- 250/636: Performed by: INTERNAL MEDICINE

## 2018-11-01 PROCEDURE — 65270000029 HC RM PRIVATE

## 2018-11-01 PROCEDURE — 74011636637 HC RX REV CODE- 636/637: Performed by: INTERNAL MEDICINE

## 2018-11-01 PROCEDURE — 89055 LEUKOCYTE ASSESSMENT FECAL: CPT | Performed by: INTERNAL MEDICINE

## 2018-11-01 PROCEDURE — 36415 COLL VENOUS BLD VENIPUNCTURE: CPT | Performed by: INTERNAL MEDICINE

## 2018-11-01 PROCEDURE — 83735 ASSAY OF MAGNESIUM: CPT | Performed by: INTERNAL MEDICINE

## 2018-11-01 PROCEDURE — 87449 NOS EACH ORGANISM AG IA: CPT | Performed by: INTERNAL MEDICINE

## 2018-11-01 PROCEDURE — 83690 ASSAY OF LIPASE: CPT | Performed by: INTERNAL MEDICINE

## 2018-11-01 PROCEDURE — 74011000258 HC RX REV CODE- 258: Performed by: INTERNAL MEDICINE

## 2018-11-01 PROCEDURE — 74011250637 HC RX REV CODE- 250/637: Performed by: INTERNAL MEDICINE

## 2018-11-01 PROCEDURE — 94760 N-INVAS EAR/PLS OXIMETRY 1: CPT

## 2018-11-01 PROCEDURE — 82962 GLUCOSE BLOOD TEST: CPT

## 2018-11-01 PROCEDURE — 85025 COMPLETE CBC W/AUTO DIFF WBC: CPT | Performed by: INTERNAL MEDICINE

## 2018-11-01 PROCEDURE — 80053 COMPREHEN METABOLIC PANEL: CPT | Performed by: INTERNAL MEDICINE

## 2018-11-01 RX ORDER — POTASSIUM CHLORIDE 7.45 MG/ML
10 INJECTION INTRAVENOUS
Status: COMPLETED | OUTPATIENT
Start: 2018-11-01 | End: 2018-11-01

## 2018-11-01 RX ORDER — ZOLPIDEM TARTRATE 5 MG/1
5 TABLET ORAL
Status: DISCONTINUED | OUTPATIENT
Start: 2018-11-01 | End: 2018-11-03 | Stop reason: HOSPADM

## 2018-11-01 RX ORDER — IPRATROPIUM BROMIDE AND ALBUTEROL SULFATE 2.5; .5 MG/3ML; MG/3ML
3 SOLUTION RESPIRATORY (INHALATION)
Status: DISCONTINUED | OUTPATIENT
Start: 2018-11-01 | End: 2018-11-03 | Stop reason: HOSPADM

## 2018-11-01 RX ORDER — POTASSIUM CHLORIDE 20 MEQ/1
40 TABLET, EXTENDED RELEASE ORAL
Status: COMPLETED | OUTPATIENT
Start: 2018-11-01 | End: 2018-11-01

## 2018-11-01 RX ADMIN — POTASSIUM CHLORIDE 40 MEQ: 20 TABLET, EXTENDED RELEASE ORAL at 09:23

## 2018-11-01 RX ADMIN — INSULIN LISPRO 2 UNITS: 100 INJECTION, SOLUTION INTRAVENOUS; SUBCUTANEOUS at 17:45

## 2018-11-01 RX ADMIN — Medication 10 ML: at 21:59

## 2018-11-01 RX ADMIN — MEROPENEM 1 G: 1 INJECTION, POWDER, FOR SOLUTION INTRAVENOUS at 04:54

## 2018-11-01 RX ADMIN — POTASSIUM CHLORIDE 10 MEQ: 10 INJECTION, SOLUTION INTRAVENOUS at 09:23

## 2018-11-01 RX ADMIN — Medication 10 ML: at 22:12

## 2018-11-01 RX ADMIN — SODIUM CHLORIDE 50 ML/HR: 900 INJECTION, SOLUTION INTRAVENOUS at 10:37

## 2018-11-01 RX ADMIN — POTASSIUM CHLORIDE 10 MEQ: 10 INJECTION, SOLUTION INTRAVENOUS at 10:39

## 2018-11-01 RX ADMIN — MEROPENEM 1 G: 1 INJECTION, POWDER, FOR SOLUTION INTRAVENOUS at 15:33

## 2018-11-01 RX ADMIN — HEPARIN SODIUM 5000 UNITS: 5000 INJECTION INTRAVENOUS; SUBCUTANEOUS at 15:33

## 2018-11-01 RX ADMIN — ZOLPIDEM TARTRATE 5 MG: 5 TABLET ORAL at 21:59

## 2018-11-01 RX ADMIN — HEPARIN SODIUM 5000 UNITS: 5000 INJECTION INTRAVENOUS; SUBCUTANEOUS at 06:34

## 2018-11-01 RX ADMIN — MEROPENEM 1 G: 1 INJECTION, POWDER, FOR SOLUTION INTRAVENOUS at 22:12

## 2018-11-01 RX ADMIN — FAMOTIDINE 20 MG: 20 TABLET ORAL at 17:46

## 2018-11-01 RX ADMIN — FAMOTIDINE 20 MG: 20 TABLET ORAL at 09:23

## 2018-11-01 RX ADMIN — POTASSIUM CHLORIDE 10 MEQ: 10 INJECTION, SOLUTION INTRAVENOUS at 10:37

## 2018-11-01 RX ADMIN — HEPARIN SODIUM 5000 UNITS: 5000 INJECTION INTRAVENOUS; SUBCUTANEOUS at 21:59

## 2018-11-01 RX ADMIN — Medication 10 ML: at 06:36

## 2018-11-01 NOTE — ROUTINE PROCESS
General Surgery End of Shift Nursing Note Bedside shift change report given to Juan Hebert RN (oncoming nurse) by Sly Cisneros RN (offgoing nurse). Report included the following information SBAR, Kardex, Procedure Summary, Intake/Output, MAR and Accordion. Shift worked:   7A-7P Summary of shift:    Patient had a fairly good day. Had several bouts of diarrhea but has since stopped. Patient requesting results of C Diff test. No other complaints or distress at this time. Issues for physician to address:   Review lab results with patient in AM.  
 
Number times ambulated in hallway past shift: 1 Number of times OOB to chair past shift: 3 Pain Management: 
Current medication: N/A Patient states pain is manageable on current pain medication: N/A 
 
GI: 
 
Current diet:  DIET FULL LIQUID No Conc. Sweets Tolerating current diet: YES Passing flatus: YES Last Bowel Movement: today Appearance: LOOSE Respiratory: 
 
Incentive Spirometer at bedside: YES Patient instructed on use: YES Patient Safety: 
 
Falls Score: 2 Bed Alarm On? Not applicable Sitter? Not applicable Elias Jackson RN

## 2018-11-01 NOTE — PROGRESS NOTES
Reason for Admission:   Pancreatitis and gall stones RRAT Score:   12 Plan for utilizing home health:     Pt is independent with ADL's and IADL's, active, driving prior to admission. Pt has used HH in past with knee surgery,unsure of agency. Pt does not use DME, has not stayed at SNF/Rehab. No recommendations at this time. Pt does not feel warranted. CM will continue to follow for discharge needs. Likelihood of Readmission:  low Transition of Care Plan:        Patient is a 68year old female. CM met with patient, introduced self and role. Pt alert/oriented and in no acute distress. Demographics verified. Preferred pharmacy is [facility] on [address] [place] [address] [place] and [facility] for mail scripts.  will transport to home by car at discharge and can assist with follow up appt. Pt and  live in a one story home with no steps into entrance. Active with PCP. Care Management Interventions PCP Verified by CM: Yes(sees dr chaparro once a year) Mode of Transport at Discharge: Other (see comment)( will transport to home at discharge) Transition of Care Consult (CM Consult): Discharge Planning Discharge Durable Medical Equipment: No(none at home) Physical Therapy Consult: No 
Occupational Therapy Consult: No 
Speech Therapy Consult: No 
Current Support Network: Own Home, Lives with Spouse(lives with  in a 1 story home with no steps into entrance) Confirm Follow Up Transport: Family( will assist with follow up appt if pt cannot drive self) Plan discussed with Pt/Family/Caregiver: Yes Discharge Location Discharge Placement: Home KIMBERLY DudleyN, RN Care Manager [id number] [place] [address] 
[phone]

## 2018-11-01 NOTE — PROGRESS NOTES
ADULT PROTOCOL: JET AEROSOL  REASSESSMENT Patient  Leonardo Quinteros     68 y.o.   female     11/1/2018  8:25 AM 
 
Breath Sounds Pre Procedure: Right Breath Sounds: Clear Left Breath Sounds: Clear Breath Sounds Post Procedure: 
 
 
Breathing pattern: Pre procedure Breathing Pattern: Regular Post procedure Heart Rate: Pre procedure Pulse: 68 
         Post procedure Resp Rate: Pre procedure Respirations: 20 
         Post procedure Oxygen: O2 Device: Room air Changed: No  
 
SpO2: Pre procedure SpO2: 97 %   Room Air Post procedure Nebulizer Therapy: Current medications Aerosolized Medications: DuoNeb TID Resp Changed: Yes, changed to PRN Smoking History: Never Smoked Problem List:  
Patient Active Problem List  
Diagnosis Code  Gall stones K80.20  Pancreatitis K85.90 Respiratory Therapist: Shila Gonzales RT

## 2018-11-01 NOTE — PROGRESS NOTES
General Surgery End of Shift Nursing Note Bedside shift change report given to Alessia Murdock (oncoming nurse) by Wm Raymond (offgoing nurse). Report included the following information SBAR, Kardex, Intake/Output, MAR and Recent Results. Shift worked:   3 pm to 7 pm  
Summary of shift:    Pt had 3 loose, watery stools. Pain meds given as requested. Tolerating diet. Stool cultures and ova/parasite sample sent. Attempted to send sample for cdiff. RN completed form and called supervisor to sign, but supervisor stated that we could not send the sample since it is beyond the 48 hrs. However, pt meets all of the criteria on the form. RN informed pm charge nurse as well. Issues for physician to address:   none Number times ambulated in hallway past shift: 0 Number of times OOB to chair past shift: 2 Pain Management: 
Current medication: tylenol Patient states pain is manageable on current pain medication: YES 
 
GI: 
 
Current diet:  DIET FULL LIQUID Tolerating current diet: YES Passing flatus: YES Last Bowel Movement: today Appearance: watery Respiratory: 
 
Incentive Spirometer at bedside: YES Patient instructed on use: YES Patient Safety: 
 
Falls Score: 1 Bed Alarm On? No 
Sitter? No 
 
Fonnie Big

## 2018-11-01 NOTE — PROGRESS NOTES
Hospitalist Progress Note NAME: Cullen Kumar :  1945 MRN:  [id number] Assessment / Plan: 
Acute pancreatitis  POA 
-Lipase > 3000 on admission, now down to WNL 
-Triglycerides normal. 
CT abd/pelvis:  Acute pancreatitis; possible developing necrosis in tail. Colonic diverticulosis, gallstones noted. U/S abd:  Cholelithiasis. No acute cholecystitis. Normal CBD. GI, Surgery consulted. No surgical intervention at this time. So far tolerating  full liquids, Lipase is normal 
IV fluids Empiric IV abx. C/w  meropenem. Lipase improved to normal. clinically improved. No fevers. Pain improved. Repeat CT abd/pelvis 10/29: 
1. Diffuse peripancreatic stranding and fluid consistent with acute 
pancreatitis. No abscess or drainable fluid collection. 2. Diminished enhancement in the neck of the pancreas consistent with pancreatic 
necrosis. 3. New bilateral pleural effusions and lower lobe atelectasis. 4. New ascites in the left lower quadrant and pelvis. 5. Atherosclerotic abdominal aorta without aneurysm. 6. Diverticulosis. CT reviewed by Surgery and GI. No surgical intervention at this time. Diarrhea: has diarrhea since yesterday, reported as liquid by nurse, stool work not sent, will monitor. Keep on isolation Bilateral pleural effusions/pulm edema  
-likely due to volume overload. Asymptomatic. 
-decreased IV fluids. Edema had decreased given lasix for 3 days 
-cont incentive spirometry Monitor closely one of the features of complicated pancreatitis is pleural effusion with high enzymatic content , I/O , weight daily Lactic acidosis 
-likely due to lower bp. Not septic at this time. No SIRS criteria. No fevers or hypothermia. RR and HR stable. Resolved. Leukocytosis 
-monitor, so far trending up now, has enough pathology in the pancreas to justify WBC elevation, c/w Meropenem, if WBC continues trending up will need to repeat image Prediabetes -A1c 6.1 HTN  
 -hold oral meds PRN hydralazine Hypokalemia: replace and monitor. HLD- hold meds GERD 
DVT prophylaxis: Heparin Code status: Full code Surrogate Decision Maker:  GI Prophylaxis: pepcid Body mass index is 30.73 kg/m². Subjective: Chief Complaint / Reason for Physician Visit \"I have diarrhea\" Review of Systems: 
Symptom Y/N Comments  Symptom Y/N Comments Fever/Chills    Chest Pain Poor Appetite    Edema Cough n   Abdominal Pain y Sputum    Joint Pain SOB/KLEIN    Pruritis/Rash Nausea/vomit    Tolerating PT/OT Diarrhea y   Tolerating Diet y Constipation    Other Could NOT obtain due to:   
 
Objective: VITALS:  
Last 24hrs VS reviewed since prior progress note. Most recent are: 
Patient Vitals for the past 24 hrs: 
 Temp Pulse Resp BP SpO2  
11/01/18 0333 97.3 °F (36.3 °C) 77 18 135/57 95 % 10/31/18 2354 99.6 °F (37.6 °C) 83 18 119/72 97 % 10/31/18 2059     97 % 10/31/18 1512 98.6 °F (37 °C) 91 18 127/46 95 % 10/31/18 1400     95 % 10/31/18 1210 98.5 °F (36.9 °C) 66 18 134/50 96 % 10/31/18 0844     95 % Intake/Output Summary (Last 24 hours) at 11/1/2018 0820 Last data filed at 11/1/2018 8895 Gross per 24 hour Intake 1641.67 ml Output 900 ml Net 741.67 ml PHYSICAL EXAM: 
General: WD, WN. Alert, cooperative, no acute distress   
EENT:  EOMI. Anicteric sclerae. MMM Resp:  Coarse BS 
CV:  Regular  rhythm,  No edema GI:  Soft, Non distended, minimal tenderness, no rebound.  +Bowel sounds Neurologic:  Alert and oriented X 3, normal speech, Psych:   Good insight. Not anxious nor agitated Skin:  No rashes. No jaundice Reviewed most current lab test results and cultures  YES Reviewed most current radiology test results   YES Review and summation of old records today    NO Reviewed patient's current orders and MAR    YES 
PMH/SH reviewed - no change compared to H&P 
 ________________________________________________________________________ Care Plan discussed with: 
  Comments Patient x Family  y  RN x Care Manager Consultant Multidiciplinary team rounds were held today with , nursing, pharmacist and clinical coordinator. Patient's plan of care was discussed; medications were reviewed and discharge planning was addressed. ________________________________________________________________________ Total NON critical care TIME:  25   Minutes Total CRITICAL CARE TIME Spent:   Minutes non procedure based Comments >50% of visit spent in counseling and coordination of care y   
________________________________________________________________________ Ortiz King MD  
 
Procedures: see electronic medical records for all procedures/Xrays and details which were not copied into this note but were reviewed prior to creation of Plan. LABS: 
I reviewed today's most current labs and imaging studies. Pertinent labs include: 
Recent Labs 11/01/18 
3442 10/31/18 
0416 10/30/18 
1222 WBC 17.7* 16.9* 19.2* HGB 10.9* 10.5* 11.3* HCT 33.5* 32.5* 36.2  289 274 Recent Labs 11/01/18 
2765 10/31/18 
0416 10/30/18 
9870  139 139  
K 2.8* 3.3* 3.4*  
 104 104 CO2 28 31 28 * 157* 112* BUN 8 17 21* CREA 0.32* 0.37* 0.32* CA 7.6* 7.5* 7.7* MG 2.1 2.2  --   
ALB 2.2* 2.2* 2.3* TBILI 0.7 0.7 0.7 SGOT 58* 62* 27 ALT 87* 69 43 Signed: Ortiz King MD

## 2018-11-01 NOTE — PROGRESS NOTES
General Surgery End of Shift Nursing Note Bedside shift change report given to Mimi Richardson (oncoming nurse) by Nancy Mack RN (offgoing nurse). Report included the following information SBAR, Kardex, MAR and Recent Results. Shift worked:   7p  
Summary of shift:    No further diarrhea this night, patient rested well Issues for physician to address:   none Number times ambulated in hallway past shift: 0 Number of times OOB to chair past shift: 1 Pain Management: 
Current medication: Tylenol Patient states pain is manageable on current pain medication: YES 
 
GI: 
 
Current diet:  DIET GI LITE (POST SURGICAL) No Conc. Sweets Tolerating current diet: YES Passing flatus: YES Last Bowel Movement: yesterday Appearance: loose Respiratory: 
 
Incentive Spirometer at bedside: YES Patient instructed on use: YES Patient Safety: 
 
Falls Score: 1 Bed Alarm On? No 
Sitter? No 
 
Akilah Malling

## 2018-11-01 NOTE — ROUTINE PROCESS
General Surgery End of Shift Nursing Note Bedside shift change report given to Deloris Cesar (oncoming nurse) by Rafaela Cosme (offgoing nurse). Report included the following information SBAR, Kardex and Intake/Output. Shift worked:   7p-1130p Summary of shift:    Nothing out of ordinary happened the few hours of evenng shift. Issues for physician to address:   none Number times ambulated in hallway past shift: 0 Number of times OOB to chair past shift: 3 Pain Management: 
Current medication: see mar Patient states pain is manageable on current pain medication: YES 
 
GI: 
 
Current diet:  DIET FULL LIQUID Tolerating current diet: YES Passing flatus: YES Last Bowel Movement: today Appearance: watery Respiratory: 
 
Incentive Spirometer at bedside: YES Patient instructed on use: YES Patient Safety: 
 
Falls Score: 1 Bed Alarm On? No 
Sitter? No 
 
Tyesha Pritchard

## 2018-11-01 NOTE — PROGRESS NOTES
GI PROGRESS NOTE 
 
NAME:             Tobin Cuellar :              1945 MRN:              [id number] Admit Date:     10/26/2018 Todays Date:  2018 Subjective:  
 
          
Had liquid diarrhea overnight. Minimal pain. WBC 17 k. Abdominal pain: improving 
 
  
Review of Systems - Respiratory ROS: no cough, shortness of breath, or wheezing Some difficulty taking deep breathe Cardiovascular ROS: no chest pain or dyspnea on exertion Medications-reviewed Current Facility-Administered Medications Medication Dose Route Frequency  potassium chloride (K-DUR, KLOR-CON) SR tablet 40 mEq  40 mEq Oral NOW  potassium chloride 10 mEq in 100 ml IVPB  10 mEq IntraVENous Q1H  
 famotidine (PEPCID) tablet 20 mg  20 mg Oral BID  albuterol-ipratropium (DUO-NEB) 2.5 MG-0.5 MG/3 ML  3 mL Nebulization TID RT  
 0.9% sodium chloride infusion  50 mL/hr IntraVENous CONTINUOUS  
 albuterol (PROVENTIL VENTOLIN) nebulizer solution 2.5 mg  2.5 mg Nebulization Q4H PRN  
 glucose chewable tablet 16 g  4 Tab Oral PRN  
 dextrose (D50W) injection syrg 12.5-25 g  25-50 mL IntraVENous PRN  
 glucagon (GLUCAGEN) injection 1 mg  1 mg IntraMUSCular PRN  
 insulin lispro (HUMALOG) injection   SubCUTAneous AC&HS  sodium chloride (NS) flush 5-10 mL  5-10 mL IntraVENous Q8H  
 sodium chloride (NS) flush 5-10 mL  5-10 mL IntraVENous PRN  
 HYDROmorphone (PF) (DILAUDID) injection 0.5 mg  0.5 mg IntraVENous Q4H PRN  
 heparin (porcine) injection 5,000 Units  5,000 Units SubCUTAneous Q8H  
 ondansetron (ZOFRAN) injection 4 mg  4 mg IntraVENous Q4H PRN  
 acetaminophen (TYLENOL) solution 650 mg  650 mg Oral Q6H PRN  promethazine (PHENERGAN) 12.5 mg in 0.9% sodium chloride 50 mL IVPB  12.5 mg IntraVENous Q6H PRN  
 meropenem (MERREM) 1 g in 0.9% sodium chloride (MBP/ADV) 50 mL  1 g IntraVENous Q8H Objective:  
 
Patient Vitals for the past 8 hrs: BP Temp Pulse Resp SpO2  
11/01/18 0333 135/57 97.3 °F (36.3 °C) 77 18 95 % No intake/output data recorded. 10/30 1901 - 11/01 0700 In: 1764.2 [P.O.:240; I.V.:1524.2] Out: 1350 [BNBBQ:1627] EXAM:   
Visit Vitals /57 Pulse 77 Temp 97.3 °F (36.3 °C) Resp 18 Ht 5' 2\" (1.575 m) Wt 76.2 kg (167 lb 15.9 oz) SpO2 95% BMI 30.73 kg/m² General appearance: alert, cooperative, no distress, appears stated age Lungs: clear to auscultation bilaterally Heart: regular rate and rhythm, S1, S2 normal, no murmur, click, rub or gallop Abdomen: soft, tender in epigastriium. Bowel sounds hypoactive. No masses,  no organomegaly Data Review Recent Labs 11/01/18 0458 10/31/18 
0785 WBC 17.7* 16.9* HGB 10.9* 10.5* HCT 33.5* 32.5*  
 289 Recent Labs 11/01/18 0458 10/31/18 
1316  139  
K 2.8* 3.3*  
 104 CO2 28 31 BUN 8 17 CREA 0.32* 0.37* * 157* CA 7.6* 7.5* Recent Labs 11/01/18 
0458 10/31/18 
0580 SGOT 58* 62* * 107  
TP 6.0* 5.7* ALB 2.2* 2.2*  
GLOB 3.8 3.5 LPSE 67* 94 Assessment:  
Severe  acute pancreatitis  
pancreatic necrosis Leucocytosis Nausea,vomiting and pain- better Gallstones Active Problems: 
  Gall stones (10/26/2018) Pancreatitis (10/26/2018) Plan: · Check for c diff DNA in stool. · Add anjelica Q. 
· Keep her on a liquid diet for now. · Will consider stopping IV abx if c diff is +. · Continue current treatment for severe pancreatitis.  
  
 
 
Rfaael Maciel MD

## 2018-11-02 ENCOUNTER — APPOINTMENT (OUTPATIENT)
Dept: CT IMAGING | Age: 73
DRG: 439 | End: 2018-11-02
Attending: INTERNAL MEDICINE
Payer: MEDICARE

## 2018-11-02 LAB
ALBUMIN SERPL-MCNC: 2.3 G/DL (ref 3.5–5)
ALBUMIN/GLOB SERPL: 0.6 {RATIO} (ref 1.1–2.2)
ALP SERPL-CCNC: 131 U/L (ref 45–117)
ALT SERPL-CCNC: 83 U/L (ref 12–78)
ANION GAP SERPL CALC-SCNC: 7 MMOL/L (ref 5–15)
AST SERPL-CCNC: 49 U/L (ref 15–37)
BACTERIA SPEC CULT: ABNORMAL
BACTERIA SPEC CULT: NORMAL
BACTERIA SPEC CULT: NORMAL
BASOPHILS # BLD: 0 K/UL (ref 0–0.1)
BASOPHILS NFR BLD: 0 % (ref 0–1)
BILIRUB SERPL-MCNC: 0.7 MG/DL (ref 0.2–1)
BUN SERPL-MCNC: 7 MG/DL (ref 6–20)
BUN/CREAT SERPL: 18 (ref 12–20)
C DIFF GDH STL QL: NEGATIVE
C DIFF TOX A+B STL QL IA: NEGATIVE
C JEJUNI+C COLI AG STL QL: NEGATIVE
CALCIUM SERPL-MCNC: 8 MG/DL (ref 8.5–10.1)
CHLORIDE SERPL-SCNC: 104 MMOL/L (ref 97–108)
CO2 SERPL-SCNC: 27 MMOL/L (ref 21–32)
CREAT SERPL-MCNC: 0.4 MG/DL (ref 0.55–1.02)
DIFFERENTIAL METHOD BLD: ABNORMAL
E COLI SXT1+2 STL IA: NEGATIVE
EOSINOPHIL # BLD: 0.2 K/UL (ref 0–0.4)
EOSINOPHIL NFR BLD: 1 % (ref 0–7)
ERYTHROCYTE [DISTWIDTH] IN BLOOD BY AUTOMATED COUNT: 13.2 % (ref 11.5–14.5)
GLOBULIN SER CALC-MCNC: 4.1 G/DL (ref 2–4)
GLUCOSE BLD STRIP.AUTO-MCNC: 118 MG/DL (ref 65–100)
GLUCOSE BLD STRIP.AUTO-MCNC: 128 MG/DL (ref 65–100)
GLUCOSE BLD STRIP.AUTO-MCNC: 132 MG/DL (ref 65–100)
GLUCOSE BLD STRIP.AUTO-MCNC: 152 MG/DL (ref 65–100)
GLUCOSE SERPL-MCNC: 127 MG/DL (ref 65–100)
HCT VFR BLD AUTO: 35.9 % (ref 35–47)
HGB BLD-MCNC: 11.8 G/DL (ref 11.5–16)
IMM GRANULOCYTES # BLD: 0.2 K/UL (ref 0–0.04)
IMM GRANULOCYTES NFR BLD AUTO: 1 % (ref 0–0.5)
INTERPRETATION: NORMAL
LIPASE SERPL-CCNC: 71 U/L (ref 73–393)
LYMPHOCYTES # BLD: 3 K/UL (ref 0.8–3.5)
LYMPHOCYTES NFR BLD: 16 % (ref 12–49)
MAGNESIUM SERPL-MCNC: 2.1 MG/DL (ref 1.6–2.4)
MCH RBC QN AUTO: 30.5 PG (ref 26–34)
MCHC RBC AUTO-ENTMCNC: 32.9 G/DL (ref 30–36.5)
MCV RBC AUTO: 92.8 FL (ref 80–99)
MONOCYTES # BLD: 1.6 K/UL (ref 0–1)
MONOCYTES NFR BLD: 9 % (ref 5–13)
NEUTS SEG # BLD: 13.3 K/UL (ref 1.8–8)
NEUTS SEG NFR BLD: 73 % (ref 32–75)
NRBC # BLD: 0 K/UL (ref 0–0.01)
NRBC BLD-RTO: 0 PER 100 WBC
PLATELET # BLD AUTO: 402 K/UL (ref 150–400)
PMV BLD AUTO: 9.5 FL (ref 8.9–12.9)
POTASSIUM SERPL-SCNC: 3.4 MMOL/L (ref 3.5–5.1)
PROT SERPL-MCNC: 6.4 G/DL (ref 6.4–8.2)
RBC # BLD AUTO: 3.87 M/UL (ref 3.8–5.2)
SERVICE CMNT-IMP: ABNORMAL
SERVICE CMNT-IMP: NORMAL
SODIUM SERPL-SCNC: 138 MMOL/L (ref 136–145)
WBC # BLD AUTO: 18.3 K/UL (ref 3.6–11)
WBC #/AREA STL HPF: NORMAL /HPF (ref 0–4)

## 2018-11-02 PROCEDURE — 74011000255 HC RX REV CODE- 255: Performed by: INTERNAL MEDICINE

## 2018-11-02 PROCEDURE — 74011636320 HC RX REV CODE- 636/320: Performed by: INTERNAL MEDICINE

## 2018-11-02 PROCEDURE — 36415 COLL VENOUS BLD VENIPUNCTURE: CPT | Performed by: INTERNAL MEDICINE

## 2018-11-02 PROCEDURE — G8987 SELF CARE CURRENT STATUS: HCPCS

## 2018-11-02 PROCEDURE — 82962 GLUCOSE BLOOD TEST: CPT

## 2018-11-02 PROCEDURE — 80053 COMPREHEN METABOLIC PANEL: CPT | Performed by: INTERNAL MEDICINE

## 2018-11-02 PROCEDURE — 85025 COMPLETE CBC W/AUTO DIFF WBC: CPT | Performed by: INTERNAL MEDICINE

## 2018-11-02 PROCEDURE — 97165 OT EVAL LOW COMPLEX 30 MIN: CPT

## 2018-11-02 PROCEDURE — 83735 ASSAY OF MAGNESIUM: CPT | Performed by: INTERNAL MEDICINE

## 2018-11-02 PROCEDURE — 74011250636 HC RX REV CODE- 250/636: Performed by: INTERNAL MEDICINE

## 2018-11-02 PROCEDURE — G8988 SELF CARE GOAL STATUS: HCPCS

## 2018-11-02 PROCEDURE — 83690 ASSAY OF LIPASE: CPT | Performed by: INTERNAL MEDICINE

## 2018-11-02 PROCEDURE — 74011250637 HC RX REV CODE- 250/637: Performed by: INTERNAL MEDICINE

## 2018-11-02 PROCEDURE — G8989 SELF CARE D/C STATUS: HCPCS

## 2018-11-02 PROCEDURE — 97535 SELF CARE MNGMENT TRAINING: CPT

## 2018-11-02 PROCEDURE — 65270000029 HC RM PRIVATE

## 2018-11-02 PROCEDURE — 74177 CT ABD & PELVIS W/CONTRAST: CPT

## 2018-11-02 PROCEDURE — 74011000258 HC RX REV CODE- 258: Performed by: INTERNAL MEDICINE

## 2018-11-02 RX ORDER — SODIUM CHLORIDE 0.9 % (FLUSH) 0.9 %
10 SYRINGE (ML) INJECTION
Status: COMPLETED | OUTPATIENT
Start: 2018-11-02 | End: 2018-11-02

## 2018-11-02 RX ORDER — BARIUM SULFATE 20 MG/ML
900 SUSPENSION ORAL
Status: COMPLETED | OUTPATIENT
Start: 2018-11-02 | End: 2018-11-02

## 2018-11-02 RX ORDER — SODIUM CHLORIDE 900 MG/100ML
INJECTION INTRAVENOUS
Status: DISPENSED
Start: 2018-11-02 | End: 2018-11-02

## 2018-11-02 RX ORDER — POTASSIUM CHLORIDE 7.45 MG/ML
10 INJECTION INTRAVENOUS ONCE
Status: COMPLETED | OUTPATIENT
Start: 2018-11-02 | End: 2018-11-02

## 2018-11-02 RX ORDER — SODIUM CHLORIDE 9 MG/ML
50 INJECTION, SOLUTION INTRAVENOUS
Status: COMPLETED | OUTPATIENT
Start: 2018-11-02 | End: 2018-11-02

## 2018-11-02 RX ORDER — NYSTATIN 100000 [USP'U]/ML
500000 SUSPENSION ORAL 4 TIMES DAILY
Status: DISCONTINUED | OUTPATIENT
Start: 2018-11-02 | End: 2018-11-03 | Stop reason: HOSPADM

## 2018-11-02 RX ADMIN — HEPARIN SODIUM 5000 UNITS: 5000 INJECTION INTRAVENOUS; SUBCUTANEOUS at 05:40

## 2018-11-02 RX ADMIN — FAMOTIDINE 20 MG: 20 TABLET ORAL at 09:08

## 2018-11-02 RX ADMIN — MEROPENEM 1 G: 1 INJECTION, POWDER, FOR SOLUTION INTRAVENOUS at 22:16

## 2018-11-02 RX ADMIN — BARIUM SULFATE 900 ML: 20 SUSPENSION ORAL at 09:08

## 2018-11-02 RX ADMIN — NYSTATIN 500000 UNITS: 500000 SUSPENSION ORAL at 22:17

## 2018-11-02 RX ADMIN — POTASSIUM CHLORIDE 10 MEQ: 10 INJECTION, SOLUTION INTRAVENOUS at 07:21

## 2018-11-02 RX ADMIN — Medication 10 ML: at 13:55

## 2018-11-02 RX ADMIN — IOPAMIDOL 100 ML: 755 INJECTION, SOLUTION INTRAVENOUS at 11:43

## 2018-11-02 RX ADMIN — MEROPENEM 1 G: 1 INJECTION, POWDER, FOR SOLUTION INTRAVENOUS at 13:47

## 2018-11-02 RX ADMIN — FAMOTIDINE 20 MG: 20 TABLET ORAL at 18:07

## 2018-11-02 RX ADMIN — SODIUM CHLORIDE 50 ML/HR: 900 INJECTION, SOLUTION INTRAVENOUS at 05:41

## 2018-11-02 RX ADMIN — SODIUM CHLORIDE 50 ML/HR: 900 INJECTION, SOLUTION INTRAVENOUS at 09:02

## 2018-11-02 RX ADMIN — ZOLPIDEM TARTRATE 5 MG: 5 TABLET ORAL at 22:17

## 2018-11-02 RX ADMIN — MEROPENEM 1 G: 1 INJECTION, POWDER, FOR SOLUTION INTRAVENOUS at 04:40

## 2018-11-02 RX ADMIN — Medication 10 ML: at 09:07

## 2018-11-02 RX ADMIN — NYSTATIN 500000 UNITS: 500000 SUSPENSION ORAL at 18:07

## 2018-11-02 RX ADMIN — HEPARIN SODIUM 5000 UNITS: 5000 INJECTION INTRAVENOUS; SUBCUTANEOUS at 13:53

## 2018-11-02 RX ADMIN — NYSTATIN 500000 UNITS: 500000 SUSPENSION ORAL at 13:53

## 2018-11-02 RX ADMIN — Medication 10 ML: at 22:17

## 2018-11-02 RX ADMIN — Medication 10 ML: at 05:39

## 2018-11-02 RX ADMIN — HEPARIN SODIUM 5000 UNITS: 5000 INJECTION INTRAVENOUS; SUBCUTANEOUS at 22:17

## 2018-11-02 NOTE — PROGRESS NOTES
Occupational Therapy EVALUATION/discharge Patient: Michelle Garcia (48 y.o. female) Date: 11/2/2018 Primary Diagnosis: Gall stones Precautions:   Contact(enteric) ASSESSMENT:  
Based on the objective data described below, the patient presents with abdominal pain, to ER 10-26-18; found to have pancreatitis and gall stones. Today demonstrated I self care and functional mobility, including cleaning herself from uncontrolled diarrhea. Appears cog intact but not formally evaled; B UE WFL with mild OA changes noted as well as mild edema B UEs and LEs. Patient expected to do well at home with support from spouse PRN, once medically stable. Currently mod I-I in room for basic self care, limited only by IV pole and lines management. Trained patient in fall prevention and energy conservation/safety with self care rose post hospitalization/several days in bed. Further skilled acute occupational therapy is not indicated at this time. VSS for full active session Discharge Recommendations: None Further Equipment Recommendations for Discharge: None SUBJECTIVE:  
Patient stated this diarrhea is terrible; I'm so sorry.  OBJECTIVE DATA SUMMARY:  
HISTORY:  
Past Medical History:  
Diagnosis Date  Arthritis   
 osteoarthritis  Chronic allergic rhinitis  Chronic pain   
 right knee  Diabetes ([address] diet controlled  GERD (gastroesophageal reflux disease)  Hypercholesterolemia  Hypertension Past Surgical History:  
Procedure Laterality Date  HX ORTHOPAEDIC  9/18/13 RIGHT TOTAL KNEE ARTHROPLASTY  HX AISSATOU AND BSO  HX TUBAL LIGATION Prior Level of Function/Environment/Context: I PTA; retired ; drives Home Situation Home Environment: Private residence One/Two Story Residence: One story Living Alone: No 
Support Systems: Family member(s) Patient Expects to be Discharged to[de-identified] Private residence Current DME Used/Available at Home: Glucometer, Grab bars Tub or Shower Type: Shower Hand dominance: Right EXAMINATION OF PERFORMANCE DEFICITS: 
Cognitive/Behavioral Status: 
Neurologic State: Alert; Appropriate for age Orientation Level: Oriented X4 Cognition: Appropriate decision making; Appropriate for age attention/concentration; Appropriate safety awareness; Follows commands Perception: Appears intact Perseveration: No perseveration noted Safety/Judgement: Fall prevention;Driving appropriateness;Good awareness of safety precautions Skin: intact Edema: + B UEs and LEs Hearing: Auditory Auditory Impairment: None Vision/Perceptual:   
    
    
    
  
    
Acuity: Within Defined Limits Corrective Lenses: Glasses Range of Motion: 
 
AROM: Within functional limits PROM: Within functional limits Strength: 
 
Strength: Within functional limits Coordination: 
Coordination: Within functional limits Fine Motor Skills-Upper: Left Intact; Right Intact(mild OA changes present B Hands) Gross Motor Skills-Upper: Left Intact; Right Intact Tone & Sensation: 
 
Tone: Normal 
Sensation: Intact Balance: 
Sitting: Intact Standing: Intact Functional Mobility and Transfers for ADLs:Bed Mobility: 
Rolling: Independent Supine to Sit: Independent Sit to Supine: Independent Scooting: Independent Transfers: 
Sit to Stand: Independent Stand to Sit: Independent Bed to Chair: Independent Bathroom Mobility: Independent Toilet Transfer : Independent ADL Assessment: 
Feeding: (currently NPO but she reports I and has physical ability) Oral Facial Hygiene/Grooming: Independent Bathing: Independent Upper Body Dressing: Independent Lower Body Dressing: Independent Toileting: Independent ADL Intervention and task modifications: 
 patient incontinent of bowel upon arising from bed with bathing needed instructed patient in fall prevention with focus on preventative bathroom safety during bathing and toileting post incontinent episode; provided training of energy conservation benefits with safe ADL techniques with good return demonstration during bathing and dressing. Patient reports pain much improved but she has not yet begun eating soild foods Cognitive Retraining Safety/Judgement: Fall prevention;Driving appropriateness;Good awareness of safety precautions Functional Measure: 
Barthel Index: 
 
Bathin Bladder: 10 Bowels: 0 Groomin Dressing: 10 Feeding: 10 Mobility: 0 Stairs: 5 Toilet Use: 10 Transfer (Bed to Chair and Back): 15 Total: 70 Barthel and G-code impairment scale: 
Percentage of impairment CH 
0% CI 
1-19% CJ 
20-39% CK 
40-59% CL 
60-79% CM 
80-99% CN 
100% Barthel Score 0-100 100 99-80 79-60 59-40 20-39 1-19 
 0 Barthel Score 0-20 20 17-19 13-16 9-12 5-8 1-4 0 The Barthel ADL Index: Guidelines 1. The index should be used as a record of what a patient does, not as a record of what a patient could do. 2. The main aim is to establish degree of independence from any help, physical or verbal, however minor and for whatever reason. 3. The need for supervision renders the patient not independent. 4. A patient's performance should be established using the best available evidence. Asking the patient, friends/relatives and nurses are the usual sources, but direct observation and common sense are also important. However direct testing is not needed. 5. Usually the patient's performance over the preceding 24-48 hours is important, but occasionally longer periods will be relevant. 6. Middle categories imply that the patient supplies over 50 per cent of the effort. 7. Use of aids to be independent is allowed. Omar Acevedo., Barthel, D.W. (4163). Functional evaluation: the Barthel Index. [facility] (14)2. JJ Diaz, Clari Nguyen., Kristi Peters.[facility], [address] (1999). Measuring the change indisability after inpatient rehabilitation; comparison of the responsiveness of the Barthel Index and Functional [place] Measure. Journal of Neurology, Neurosurgery, and Psychiatry, 66(4), 372-954. SUZIE Hartman, LIANE Lazo, & Mario Amador M.A. (2004.) Assessment of post-stroke quality of life in cost-effectiveness studies: The usefulness of the Barthel Index and the EuroQoL-5D. [facility], 13, 287-33 G codes: In compliance with CMSs Claims Based Outcome Reporting, the following G-code set was chosen for this patient based on their primary functional limitation being treated: The outcome measure chosen to determine the severity of the functional limitation was the Barthel Index  with a score of 70/100 which was correlated with the impairment scale. ? Self Care:  
  - CURRENT STATUS: CJ - 20%-39% impaired, limited or restricted  - GOAL STATUS: CJ - 20%-39% impaired, limited or restricted  - D/C STATUS:  CJ - 20%-39% impaired, limited or restricted Occupational Therapy Evaluation Charge Determination History Examination Decision-Making LOW Complexity : Brief history review  LOW Complexity : 1-3 performance deficits relating to physical, cognitive , or psychosocial skils that result in activity limitations and / or participation restrictions  LOW Complexity : No comorbidities that affect functional and no verbal or physical assistance needed to complete eval tasks Based on the above components, the patient evaluation is determined to be of the following complexity level: LOW Pain: 
Pain Scale 1: Numeric (0 - 10) Pain Intensity 1: 3 Pain Location 1: Abdomen Pain Orientation 1: Upper Activity Tolerance:  
good Please refer to the flowsheet for vital signs taken during this treatment. After treatment: []  Patient left in no apparent distress sitting up in chair 
[x]  Patient left in no apparent distress in bed 
[x]  Call bell left within reach [x]  Nursing notified 
[]  Caregiver present 
[]  Bed alarm activated COMMUNICATION/EDUCATION:  
Communication/Collaboration: 
[x]      Home safety education was provided and the patient/caregiver indicated understanding. [x]      Patient/family have participated as able and agree with findings and recommendations. []      Patient is unable to participate in plan of care at this time. Findings and recommendations were discussed with: Registered Nurse Nora Smith OTR/L Time Calculation: 26 mins

## 2018-11-02 NOTE — PROGRESS NOTES
Bedside shift change report given to Walter Chaparro (oncoming nurse) by Corin Pickens RN (offgoing nurse). Report included the following information SBAR, Kardex, MAR, Recent Results and Cardiac Rhythm NSR.

## 2018-11-02 NOTE — PROGRESS NOTES
Physical Therapy Attempting again this pm to see patient for PT evaluation. Patient reporting independence with all mobility and has been up ad vanessa in the room. Nursing also reporting patient up ad vanessa without difficulty. No PT needs at this time. Encouraged patient to be OOB as much as tolerated. Will sign off. Thank you, Nellie Florian, PT

## 2018-11-02 NOTE — PROGRESS NOTES
Physical Therapy Consult received and chart reviewed. Attempting to see patient for PT evaluation. Patient is currently off floor at CT. Will follow back later. Thank you, Indio Martinez, PT

## 2018-11-02 NOTE — PROGRESS NOTES
Hospitalist Progress Note NAME: Debo Hodges :  1945 MRN:  [id number] Assessment / Plan: 
Acute pancreatitis  POA 
-Lipase > 3000 on admission, now down to WNL 
-Triglycerides normal. 
CT abd/pelvis:  Acute pancreatitis; possible developing necrosis in tail. Colonic diverticulosis, gallstones noted. U/S abd:  Cholelithiasis. No acute cholecystitis. Normal CBD. GI, Surgery consulted. No surgical intervention at this time. So far tolerating  full liquids, Lipase is normal 
IV fluids Empiric IV abx. C/w  meropenem. Lipase improved to normal. clinically improved. No fevers. Pain improved. Repeat CT abd/pelvis 10/29: 
1. Diffuse peripancreatic stranding and fluid consistent with acute 
pancreatitis. No abscess or drainable fluid collection. 2. Diminished enhancement in the neck of the pancreas consistent with pancreatic 
necrosis. 3. New bilateral pleural effusions and lower lobe atelectasis. 4. New ascites in the left lower quadrant and pelvis. 5. Atherosclerotic abdominal aorta without aneurysm. 6. Diverticulosis. CT reviewed by Surgery and GI. No surgical intervention at this time. WBC up again will check CT abdomen and pelvis. Diarrhea: still having diarrhea, culture shows heavy growth of Pseudomonas, C. Diff is pending. Oral Thrush: start Nystatin. Bilateral pleural effusions/pulm edema  
-likely due to volume overload. Asymptomatic. 
-decreased IV fluids. Edema had decreased given lasix for 3 days 
-cont incentive spirometry Monitor closely one of the features of complicated pancreatitis is pleural effusion with high enzymatic content , I/O , weight daily Lactic acidosis  So far resolved. -likely due to lower bp. Not septic at this time. No SIRS criteria. No fevers or hypothermia. RR and HR stable.   
Leukocytosis 
-monitor, so far trending up now, has enough pathology in the pancreas to justify WBC elevation, c/w Meropenem, WBC up today again, will repeat CT abdomen and pelvis Prediabetes -A1c 6.1 HTN  
-hold oral meds PRN hydralazine Hypokalemia: replace and monitor. HLD- hold meds GERD 
DVT prophylaxis: Heparin Code status: Full code Surrogate Decision Maker:  GI Prophylaxis: pepcid Body mass index is 30.24 kg/m². Subjective: Chief Complaint / Reason for Physician Visit \"I have these patches in my tongue\" Review of Systems: 
Symptom Y/N Comments  Symptom Y/N Comments Fever/Chills    Chest Pain Poor Appetite    Edema Cough n   Abdominal Pain y Sputum    Joint Pain SOB/KLEIN    Pruritis/Rash Nausea/vomit    Tolerating PT/OT Diarrhea y   Tolerating Diet y Constipation    Other Could NOT obtain due to:   
 
Objective: VITALS:  
Last 24hrs VS reviewed since prior progress note. Most recent are: 
Patient Vitals for the past 24 hrs: 
 Temp Pulse Resp BP SpO2  
11/02/18 0909 97.4 °F (36.3 °C) 80 20 121/67 96 % 11/02/18 0416 97.9 °F (36.6 °C) 81 20 139/77 95 % 11/01/18 2312 99.1 °F (37.3 °C) 73 20 147/68 95 % 11/01/18 2049 99.4 °F (37.4 °C) 76 19 146/71 96 % 11/01/18 1533 98.6 °F (37 °C) 73 20 143/69 94 % 11/01/18 1213 99.1 °F (37.3 °C) 68 18 138/61 100 % Intake/Output Summary (Last 24 hours) at 11/2/2018 1059 Last data filed at 11/1/2018 2212 Gross per 24 hour Intake 3720 ml Output 400 ml Net 3320 ml PHYSICAL EXAM: 
General: WD, WN. Alert, cooperative, no acute distress   
EENT:  EOMI. Anicteric sclerae. MMM Resp:  Coarse BS 
CV:  Regular  rhythm,  No edema GI:  Soft, Non distended, minimal tenderness, no rebound.  +Bowel sounds Neurologic:  Alert and oriented X 3, normal speech, Psych:   Good insight. Not anxious nor agitated Skin:  No rashes. No jaundice Reviewed most current lab test results and cultures  YES Reviewed most current radiology test results   YES Review and summation of old records today    NO Reviewed patient's current orders and MAR    YES 
 PMH/SH reviewed - no change compared to H&P 
________________________________________________________________________ Care Plan discussed with: 
  Comments Patient x Family  y  RN x Care Manager Consultant Multidiciplinary team rounds were held today with , nursing, pharmacist and clinical coordinator. Patient's plan of care was discussed; medications were reviewed and discharge planning was addressed. ________________________________________________________________________ Total NON critical care TIME:  25   Minutes Total CRITICAL CARE TIME Spent:   Minutes non procedure based Comments >50% of visit spent in counseling and coordination of care y   
________________________________________________________________________ Agustín Ferrera MD  
 
Procedures: see electronic medical records for all procedures/Xrays and details which were not copied into this note but were reviewed prior to creation of Plan. LABS: 
I reviewed today's most current labs and imaging studies. Pertinent labs include: 
Recent Labs 11/02/18 
4782 11/01/18 
0458 10/31/18 
9508 WBC 18.3* 17.7* 16.9* HGB 11.8 10.9* 10.5* HCT 35.9 33.5* 32.5* * 344 289 Recent Labs 11/02/18 
5806 11/01/18 
0458 10/31/18 
1460  139 139  
K 3.4* 2.8* 3.3*  
 103 104 CO2 27 28 31 * 124* 157* BUN 7 8 17 CREA 0.40* 0.32* 0.37* CA 8.0* 7.6* 7.5* MG 2.1 2.1 2.2 ALB 2.3* 2.2* 2.2* TBILI 0.7 0.7 0.7 SGOT 49* 58* 62* ALT 83* 87* 69 Signed: Agustín Ferrera MD

## 2018-11-02 NOTE — PROGRESS NOTES
Patient complains of white patches on tongue with dry mouth. She said she just noticed it today. Upon assessment her tongue. I spoke with Dr. Sachin El and order was given to wait until CT abd is done and patient resumes diet.

## 2018-11-02 NOTE — PROGRESS NOTES
GI PROGRESS NOTE 
 
NAME:             Anupama Mcclelland :              1945 MRN:              [id number] Admit Date:     10/26/2018 Todays Date:  2018 Subjective:  
 
          
Off the floor for a repeat CT currently. Per RN some pain. Current Facility-Administered Medications Medication Dose Route Frequency  0.9% sodium chloride (MBP/ADV) infusion  nystatin (MYCOSTATIN) 100,000 unit/mL oral suspension 500,000 Units  500,000 Units Oral QID  albuterol-ipratropium (DUO-NEB) 2.5 MG-0.5 MG/3 ML  3 mL Nebulization Q4H PRN  
 zolpidem (AMBIEN) tablet 5 mg  5 mg Oral QHS PRN  
 famotidine (PEPCID) tablet 20 mg  20 mg Oral BID  
 0.9% sodium chloride infusion  50 mL/hr IntraVENous CONTINUOUS  
 albuterol (PROVENTIL VENTOLIN) nebulizer solution 2.5 mg  2.5 mg Nebulization Q4H PRN  
 glucose chewable tablet 16 g  4 Tab Oral PRN  
 dextrose (D50W) injection syrg 12.5-25 g  25-50 mL IntraVENous PRN  
 glucagon (GLUCAGEN) injection 1 mg  1 mg IntraMUSCular PRN  
 insulin lispro (HUMALOG) injection   SubCUTAneous AC&HS  sodium chloride (NS) flush 5-10 mL  5-10 mL IntraVENous Q8H  
 sodium chloride (NS) flush 5-10 mL  5-10 mL IntraVENous PRN  
 HYDROmorphone (PF) (DILAUDID) injection 0.5 mg  0.5 mg IntraVENous Q4H PRN  
 heparin (porcine) injection 5,000 Units  5,000 Units SubCUTAneous Q8H  
 ondansetron (ZOFRAN) injection 4 mg  4 mg IntraVENous Q4H PRN  
 acetaminophen (TYLENOL) solution 650 mg  650 mg Oral Q6H PRN  promethazine (PHENERGAN) 12.5 mg in 0.9% sodium chloride 50 mL IVPB  12.5 mg IntraVENous Q6H PRN  
 meropenem (MERREM) 1 g in 0.9% sodium chloride (MBP/ADV) 50 mL  1 g IntraVENous Q8H Objective:  
 
Patient Vitals for the past 8 hrs: 
 BP Temp Pulse Resp SpO2  
18 0909 121/67 97.4 °F (36.3 °C) 80 20 96 % 18 0416 139/77 97.9 °F (36.6 °C) 81 20 95 % No intake/output data recorded. 10/31 1901 - 700 In: 4471.7 [P.O.:120; I.V.:4351.7] Out: 400 [Urine:400] EXAM:   
Visit Vitals /67 Pulse 80 Temp 97.4 °F (36.3 °C) Resp 20 Ht 5' 2\" (1.575 m) Wt 75 kg (165 lb 5.5 oz) SpO2 96% BMI 30.24 kg/m² NA Data Review Recent Labs 11/02/18 
5399 11/01/18 
9086 WBC 18.3* 17.7* HGB 11.8 10.9* HCT 35.9 33.5* * 344 Recent Labs 11/02/18 
8516 11/01/18 
7381  139  
K 3.4* 2.8*  
 103 CO2 27 28 BUN 7 8 CREA 0.40* 0.32* * 124* CA 8.0* 7.6* Recent Labs 11/02/18 
3750 11/01/18 
6449 SGOT 49* 58* * 118* TP 6.4 6.0* ALB 2.3* 2.2*  
GLOB 4.1* 3.8 LPSE 71* 67* Assessment:  
Severe  acute pancreatitis  
pancreatic necrosis Leucocytosis Nausea,vomiting and pain- better Gallstones Active Problems: 
  Gall stones (10/26/2018) Pancreatitis (10/26/2018) Plan: ·  Await repeat CT ordered by TRISTAN Sagastume her RN 
· c diff DNA in stool was negative. Heavy Pseudomonas growth in stool · Add anjelica Q. 
·  Liquid diet for now. · Following with you Nazario Caputo MD

## 2018-11-02 NOTE — PROGRESS NOTES
Initial Nutrition Assessment: 
 
INTERVENTIONS/RECOMMENDATIONS:  
· Meals/Snacks: General/healthful diet:  Continue GI Lite Diet as tolerated. · Supplement:  RD ordered Ensure Clear with breakfast to try. May continue to supplement post discharge. ASSESSMENT:  
11/2:  Chart reviewed; med noted for gallbladder disease. Plans are to have gallbladder removed in 6 weeks. Currently, tolerating diet advancement to GI Lite. Pt had a lot of questions regarding diet currently and for discharge. Pt has been following weight watchers recently with weight loss and pt able to demonstrate healthy ways to cook. prepare low fat foods. Suggest pt to continue healthy meal prep post discharge. RD assisted pt with dinner selection for tonight and breakfast tomorrow. Possible discharge tomorrow. We also discussed Ensure Clear supplement if pt unable to eat much and weight loss occurs to aggressively. Pt agreeable to try and provided resources to purchase at home. Diet Order: (GI Lite) % Eaten:  No data found. Pertinent Medications: [x]Reviewed []Other Pertinent Labs: [x]Reviewed []Other Food Allergies: [x]None []Other Last BM: 11/2   []Active     [x]Hyperactive  []Hypoactive       [] Absent BS Skin:    [x] Intact   [] Incision  [] Breakdown  [] Other: Anthropometrics:  
Height: 5' 2\" (157.5 cm) Weight: 75 kg (165 lb 5.5 oz) IBW (%IBW):   ( ) UBW (%UBW):   (  %) Last Weight Metrics: 
Weight Loss Metrics 11/1/2018 9/3/2013 Today's Wt 165 lb 5.5 oz 161 lb 13.1 oz  
BMI 30.24 kg/m2 30.59 kg/m2 BMI: Body mass index is 30.24 kg/m². This BMI is indicative of: 
 []Underweight    []Normal    [x]Overweight    [] Obesity   [] Extreme Obesity (BMI>40) Estimated Nutrition Needs (Based on):  
0891 Kcals/day(BMR (1205) x 1. 3AF) , 75 g(1.0 g/kg bw) Protein Carbohydrate: At Least 130 g/day  Fluids: 1600 mL/day (1ml/kcal) NUTRITION DIAGNOSES:  
Problem:  Altered GI function Etiology: related to gallbladder issues Signs/Symptoms: as evidenced by persistent diarrhea, limited po/nutritional intake NUTRITION INTERVENTIONS: 
Meals/Snacks: General/healthful diet GOAL:  
PO intake at least 50% of meals next 5-7 days LEARNING NEEDS (Diet, Food/Nutrient-Drug Interaction):  
 [x] None Identified 
 [] Identified and Education Provided/Documented 
 [] Identified and Pt declined/was not appropriate Cultureal, Advent, OR Ethnic Dietary Needs:  
 [x] None Identified 
 [] Identified and Addressed 
 
 [x] Interdisciplinary Care Plan Reviewed/Documented  
 [x] Discharge Planning:   Continue low fat diet until gallbladder removal in 6 weeks; reviewed diet recommendations with pt; may also continue to supplement with Ensure Clear daily MONITORING /EVALUATION:  
Food/Nutrient Intake Outcomes: Total energy intake Physical Signs/Symptoms Outcomes: Weight/weight change, GI profile NUTRITION RISK:  
 [x] Patient At Nutritional Risk  
 [] Patient Not At Nutritional Risk PT SEEN FOR:  
 []  MD Consult: []Calorie Count []Diabetic Diet Education []Diet Education []Electrolyte Management []General Nutrition Management and Supplements []Management of Tube Feeding []TPN Recommendations []  RN Referral:  []MST score >=2 
   []Enteral/Parenteral Nutrition PTA []Pregnant: Gestational DM or Multigestation 
   []Pressure Ulcer/Wound Care needs 
     
[]  Low BMI [x]  JINA Walter RD Pager [phone] Weekend Pager [phone]

## 2018-11-03 VITALS
SYSTOLIC BLOOD PRESSURE: 120 MMHG | RESPIRATION RATE: 16 BRPM | OXYGEN SATURATION: 94 % | HEIGHT: 62 IN | TEMPERATURE: 98.3 F | BODY MASS INDEX: 29.09 KG/M2 | HEART RATE: 74 BPM | WEIGHT: 158.07 LBS | DIASTOLIC BLOOD PRESSURE: 67 MMHG

## 2018-11-03 PROBLEM — B37.0 ORAL THRUSH: Status: ACTIVE | Noted: 2018-11-03

## 2018-11-03 PROBLEM — R19.7 DIARRHEA: Status: ACTIVE | Noted: 2018-11-03

## 2018-11-03 LAB
ALBUMIN SERPL-MCNC: 2.3 G/DL (ref 3.5–5)
ALBUMIN/GLOB SERPL: 0.6 {RATIO} (ref 1.1–2.2)
ALP SERPL-CCNC: 124 U/L (ref 45–117)
ALT SERPL-CCNC: 69 U/L (ref 12–78)
ANION GAP SERPL CALC-SCNC: 9 MMOL/L (ref 5–15)
AST SERPL-CCNC: 35 U/L (ref 15–37)
BASOPHILS # BLD: 0 K/UL (ref 0–0.1)
BASOPHILS NFR BLD: 0 % (ref 0–1)
BILIRUB SERPL-MCNC: 0.5 MG/DL (ref 0.2–1)
BUN SERPL-MCNC: 6 MG/DL (ref 6–20)
BUN/CREAT SERPL: 19 (ref 12–20)
CALCIUM SERPL-MCNC: 7.6 MG/DL (ref 8.5–10.1)
CHLORIDE SERPL-SCNC: 105 MMOL/L (ref 97–108)
CO2 SERPL-SCNC: 26 MMOL/L (ref 21–32)
CREAT SERPL-MCNC: 0.32 MG/DL (ref 0.55–1.02)
DIFFERENTIAL METHOD BLD: ABNORMAL
EOSINOPHIL # BLD: 0.2 K/UL (ref 0–0.4)
EOSINOPHIL NFR BLD: 2 % (ref 0–7)
ERYTHROCYTE [DISTWIDTH] IN BLOOD BY AUTOMATED COUNT: 13.2 % (ref 11.5–14.5)
GLOBULIN SER CALC-MCNC: 3.7 G/DL (ref 2–4)
GLUCOSE BLD STRIP.AUTO-MCNC: 122 MG/DL (ref 65–100)
GLUCOSE BLD STRIP.AUTO-MCNC: 163 MG/DL (ref 65–100)
GLUCOSE SERPL-MCNC: 134 MG/DL (ref 65–100)
HCT VFR BLD AUTO: 34.8 % (ref 35–47)
HGB BLD-MCNC: 11.3 G/DL (ref 11.5–16)
IMM GRANULOCYTES # BLD: 0.2 K/UL (ref 0–0.04)
IMM GRANULOCYTES NFR BLD AUTO: 1 % (ref 0–0.5)
LIPASE SERPL-CCNC: 84 U/L (ref 73–393)
LYMPHOCYTES # BLD: 2.4 K/UL (ref 0.8–3.5)
LYMPHOCYTES NFR BLD: 16 % (ref 12–49)
MAGNESIUM SERPL-MCNC: 2 MG/DL (ref 1.6–2.4)
MCH RBC QN AUTO: 30.1 PG (ref 26–34)
MCHC RBC AUTO-ENTMCNC: 32.5 G/DL (ref 30–36.5)
MCV RBC AUTO: 92.6 FL (ref 80–99)
MONOCYTES # BLD: 1.2 K/UL (ref 0–1)
MONOCYTES NFR BLD: 8 % (ref 5–13)
NEUTS SEG # BLD: 10.9 K/UL (ref 1.8–8)
NEUTS SEG NFR BLD: 73 % (ref 32–75)
NRBC # BLD: 0 K/UL (ref 0–0.01)
NRBC BLD-RTO: 0 PER 100 WBC
PLATELET # BLD AUTO: 384 K/UL (ref 150–400)
PMV BLD AUTO: 9.4 FL (ref 8.9–12.9)
POTASSIUM SERPL-SCNC: 2.9 MMOL/L (ref 3.5–5.1)
PROT SERPL-MCNC: 6 G/DL (ref 6.4–8.2)
RBC # BLD AUTO: 3.76 M/UL (ref 3.8–5.2)
SERVICE CMNT-IMP: ABNORMAL
SERVICE CMNT-IMP: ABNORMAL
SODIUM SERPL-SCNC: 140 MMOL/L (ref 136–145)
WBC # BLD AUTO: 14.9 K/UL (ref 3.6–11)

## 2018-11-03 PROCEDURE — 82962 GLUCOSE BLOOD TEST: CPT

## 2018-11-03 PROCEDURE — 74011636637 HC RX REV CODE- 636/637: Performed by: INTERNAL MEDICINE

## 2018-11-03 PROCEDURE — 83690 ASSAY OF LIPASE: CPT | Performed by: INTERNAL MEDICINE

## 2018-11-03 PROCEDURE — 85025 COMPLETE CBC W/AUTO DIFF WBC: CPT | Performed by: INTERNAL MEDICINE

## 2018-11-03 PROCEDURE — 74011250636 HC RX REV CODE- 250/636: Performed by: INTERNAL MEDICINE

## 2018-11-03 PROCEDURE — 83735 ASSAY OF MAGNESIUM: CPT | Performed by: INTERNAL MEDICINE

## 2018-11-03 PROCEDURE — 80053 COMPREHEN METABOLIC PANEL: CPT | Performed by: INTERNAL MEDICINE

## 2018-11-03 PROCEDURE — 74011000258 HC RX REV CODE- 258: Performed by: INTERNAL MEDICINE

## 2018-11-03 PROCEDURE — 74011250637 HC RX REV CODE- 250/637: Performed by: INTERNAL MEDICINE

## 2018-11-03 PROCEDURE — 36415 COLL VENOUS BLD VENIPUNCTURE: CPT | Performed by: INTERNAL MEDICINE

## 2018-11-03 RX ORDER — CIPROFLOXACIN 500 MG/1
500 TABLET ORAL EVERY 12 HOURS
Qty: 10 TAB | Refills: 0 | Status: SHIPPED | OUTPATIENT
Start: 2018-11-03 | End: 2018-11-08

## 2018-11-03 RX ORDER — OXYCODONE AND ACETAMINOPHEN 5; 325 MG/1; MG/1
1 TABLET ORAL
Qty: 20 TAB | Refills: 0 | Status: ON HOLD | OUTPATIENT
Start: 2018-11-03 | End: 2018-12-05 | Stop reason: ALTCHOICE

## 2018-11-03 RX ORDER — BUMETANIDE 0.5 MG/1
0.5 TABLET ORAL DAILY
Qty: 30 TAB | Refills: 1 | Status: SHIPPED | OUTPATIENT
Start: 2018-11-03

## 2018-11-03 RX ORDER — FAMOTIDINE 20 MG/1
20 TABLET, FILM COATED ORAL 2 TIMES DAILY
Qty: 60 TAB | Refills: 1 | Status: SHIPPED | OUTPATIENT
Start: 2018-11-03

## 2018-11-03 RX ORDER — NYSTATIN 100000 [USP'U]/ML
500000 SUSPENSION ORAL 4 TIMES DAILY
Qty: 473 ML | Refills: 0 | Status: SHIPPED | OUTPATIENT
Start: 2018-11-03 | End: 2019-01-03 | Stop reason: ALTCHOICE

## 2018-11-03 RX ORDER — POTASSIUM CHLORIDE 7.45 MG/ML
10 INJECTION INTRAVENOUS
Status: COMPLETED | OUTPATIENT
Start: 2018-11-03 | End: 2018-11-03

## 2018-11-03 RX ORDER — ONDANSETRON HYDROCHLORIDE 8 MG/1
8 TABLET, FILM COATED ORAL
Qty: 30 TAB | Refills: 0 | Status: SHIPPED | OUTPATIENT
Start: 2018-11-03 | End: 2019-01-03 | Stop reason: ALTCHOICE

## 2018-11-03 RX ORDER — CIPROFLOXACIN 500 MG/1
500 TABLET ORAL EVERY 12 HOURS
Status: DISCONTINUED | OUTPATIENT
Start: 2018-11-03 | End: 2018-11-03 | Stop reason: HOSPADM

## 2018-11-03 RX ORDER — POTASSIUM CHLORIDE 1.5 G/1.77G
40 POWDER, FOR SOLUTION ORAL
Status: COMPLETED | OUTPATIENT
Start: 2018-11-03 | End: 2018-11-03

## 2018-11-03 RX ADMIN — Medication 1 CAPSULE: at 08:24

## 2018-11-03 RX ADMIN — INSULIN LISPRO 2 UNITS: 100 INJECTION, SOLUTION INTRAVENOUS; SUBCUTANEOUS at 13:06

## 2018-11-03 RX ADMIN — FAMOTIDINE 20 MG: 20 TABLET ORAL at 08:24

## 2018-11-03 RX ADMIN — Medication 10 ML: at 05:48

## 2018-11-03 RX ADMIN — NYSTATIN 500000 UNITS: 500000 SUSPENSION ORAL at 13:05

## 2018-11-03 RX ADMIN — CIPROFLOXACIN 500 MG: 500 TABLET, FILM COATED ORAL at 13:05

## 2018-11-03 RX ADMIN — POTASSIUM CHLORIDE 40 MEQ: 1.5 POWDER, FOR SOLUTION ORAL at 07:22

## 2018-11-03 RX ADMIN — POTASSIUM CHLORIDE 10 MEQ: 10 INJECTION, SOLUTION INTRAVENOUS at 09:24

## 2018-11-03 RX ADMIN — HEPARIN SODIUM 5000 UNITS: 5000 INJECTION INTRAVENOUS; SUBCUTANEOUS at 05:48

## 2018-11-03 RX ADMIN — MEROPENEM 1 G: 1 INJECTION, POWDER, FOR SOLUTION INTRAVENOUS at 05:47

## 2018-11-03 RX ADMIN — POTASSIUM CHLORIDE 10 MEQ: 10 INJECTION, SOLUTION INTRAVENOUS at 07:22

## 2018-11-03 RX ADMIN — SODIUM CHLORIDE 50 ML/HR: 900 INJECTION, SOLUTION INTRAVENOUS at 03:21

## 2018-11-03 RX ADMIN — NYSTATIN 500000 UNITS: 500000 SUSPENSION ORAL at 08:25

## 2018-11-03 NOTE — DISCHARGE INSTRUCTIONS
HOSPITALIST DISCHARGE INSTRUCTIONS  NAME: Keena Posey   :  1945   MRN:  [id number]     Date/Time:  11/3/2018 9:48 AM    ADMIT DATE: 10/26/2018     DISCHARGE DATE: 11/3/2018     DIAGNOSIS:  Pancreatitis, Gallstones, HTN, Oral Thrush, Diarrhea, Hyperlipidemia, Leukocytosis, GERD. MEDICATIONS:                As per medication reconciliation    · It is important that you take the medication exactly as they are prescribed. · Keep your medication in the bottles provided by the pharmacist and keep a list of the medication names, dosages, and times to be taken in your wallet. · Do not take other medications without consulting your doctor. Pain Management: per above medications    What to do at Home    Recommended diet:  Cardiac Diet and Low fat, Low cholesterol    Recommended activity: Activity as tolerated and No driving while on analgesics    If you experience any of the following symptoms then please call your primary care physician or return to the emergency room if you cannot get hold of your doctor:  Fever, chills, nausea, vomiting, diarrhea, change in mentation, falling, bleeding, shortness of breath. Follow Up:   PCP you are to call and set up an appointment to see them in 2 week. F/U GI Dr. Kylie Bentley  F/U Surgery Dr. Kwabena Valle obtained by :  I understand that if any problems occur once I am at home I am to contact my physician. I understand and acknowledge receipt of the instructions indicated above.                                                                                                                                            Physician's or R.N.'s Signature                                                                  Date/Time                                                                                                                                              Patient or Representative Signature Date/Time

## 2018-11-03 NOTE — PROGRESS NOTES
GI PROGRESS NOTE 
 
NAME:             Fredi Basilio :              1945 MRN:              [id number] Admit Date:     10/26/2018 Todays Date:  11/3/2018 Subjective:  
 
Seen in the room with  at bedside. She has no more pain. She wants to go home. Tolerated GI lite diet. Current Facility-Administered Medications Medication Dose Route Frequency  potassium chloride 10 mEq in 100 ml IVPB  10 mEq IntraVENous Q1H  
 ciprofloxacin HCl (CIPRO) tablet 500 mg  500 mg Oral Q12H  
 lactobac ac& pc-s.therm-b.anim (MICHAELLE Q/RISAQUAD)  1 Cap Oral DAILY  nystatin (MYCOSTATIN) 100,000 unit/mL oral suspension 500,000 Units  500,000 Units Oral QID  albuterol-ipratropium (DUO-NEB) 2.5 MG-0.5 MG/3 ML  3 mL Nebulization Q4H PRN  
 zolpidem (AMBIEN) tablet 5 mg  5 mg Oral QHS PRN  
 famotidine (PEPCID) tablet 20 mg  20 mg Oral BID  
 0.9% sodium chloride infusion  50 mL/hr IntraVENous CONTINUOUS  
 albuterol (PROVENTIL VENTOLIN) nebulizer solution 2.5 mg  2.5 mg Nebulization Q4H PRN  
 glucose chewable tablet 16 g  4 Tab Oral PRN  
 dextrose (D50W) injection syrg 12.5-25 g  25-50 mL IntraVENous PRN  
 glucagon (GLUCAGEN) injection 1 mg  1 mg IntraMUSCular PRN  
 insulin lispro (HUMALOG) injection   SubCUTAneous AC&HS  sodium chloride (NS) flush 5-10 mL  5-10 mL IntraVENous Q8H  
 sodium chloride (NS) flush 5-10 mL  5-10 mL IntraVENous PRN  
 HYDROmorphone (PF) (DILAUDID) injection 0.5 mg  0.5 mg IntraVENous Q4H PRN  
 heparin (porcine) injection 5,000 Units  5,000 Units SubCUTAneous Q8H  
 ondansetron (ZOFRAN) injection 4 mg  4 mg IntraVENous Q4H PRN  
 acetaminophen (TYLENOL) solution 650 mg  650 mg Oral Q6H PRN  promethazine (PHENERGAN) 12.5 mg in 0.9% sodium chloride 50 mL IVPB  12.5 mg IntraVENous Q6H PRN Objective:  
 
Patient Vitals for the past 8 hrs: 
 BP Temp Pulse Resp SpO2  
18 0736 140/63 98.5 °F (36.9 °C) 79 16 100 % 11/03/18 0354 144/64 98.9 °F (37.2 °C) 84 17 93 % 11/03 0701 - 11/03 1900 In: 359.2 [P.O.:240; I.V.:119.2] Out: 800 [Urine:800] 11/01 1901 - 11/03 0700 In: 2029.2 [P.O.:120; I.V.:1909.2] Out: 900 [Urine:900] EXAM:   
Visit Vitals /63 (BP 1 Location: Right arm, BP Patient Position: At rest) Pulse 79 Temp 98.5 °F (36.9 °C) Resp 16 Ht 5' 2\" (1.575 m) Wt 71.7 kg (158 lb 1.1 oz) SpO2 100% BMI 28.91 kg/m² Gen: AAO Chest:CTA Heart: s1.s2, GI soft, mild epigastric pain. + BS Data Review Recent Labs 11/03/18 
6013 11/02/18 
0034 WBC 14.9* 18.3* HGB 11.3* 11.8 HCT 34.8* 35.9  402* Recent Labs 11/03/18 
2552 11/02/18 
6469  138  
K 2.9* 3.4*  
 104 CO2 26 27 BUN 6 7 CREA 0.32* 0.40* * 127* CA 7.6* 8.0* Recent Labs 11/03/18 
1468 11/02/18 
9536 SGOT 35 49* * 131* TP 6.0* 6.4 ALB 2.3* 2.3*  
GLOB 3.7 4.1*  
LPSE 84 71* Assessment:  
Severe  acute pancreatitis  
pancreatic necrosis Leucocytosis Nausea,vomiting and pain- better Gallstones Active Problems: 
  Gall stones (10/26/2018) Pancreatitis (10/26/2018) Oral thrush (11/3/2018) Diarrhea (11/3/2018) Plan:  
 Repeat CT showed pancreatitis changes with hypoenhancement of the pancreatic neck  again noted. Remains concerning for necrosis. Decreasing ascites in the pelvis. Unchanged small pleural effusions. She wants to go home as it is day 9 for her today. I am ok with this as long as she stays fat free. F.u with Dr Daniela rolon for Gallstones. Added anjelica MURILLO spoke with hospitalist/Dr hamlin about her.   
 
 
Vito Murillo MD

## 2018-11-03 NOTE — DISCHARGE SUMMARY
Hospitalist Discharge Summary     Patient ID:  Yamil Woods  [id number]  68 y.o.  1945    PCP on record: Tran Noel MD    Admit date: 10/26/2018  Discharge date and time: 11/3/2018      DISCHARGE DIAGNOSIS:    Pancreatitis, Gallstones, HTN, Oral Thrush, Diarrhea, Hyperlipidemia, Leukocytosis, GERD. CONSULTATIONS:  IP CONSULT TO GASTROENTEROLOGY  IP CONSULT TO GENERAL SURGERY    Excerpted HPI from H&P of Ivory Sawyer MD:  Karsten Barroso is a 68 y.o.  female with PMHx significant for HTN, GERD,pre diabetes, hypercholesterolemia, and arthritis, presents to the er  C/o  new onset, constant, moderate, sharp, non-radiating epigastric and RUQ pain starting at 1000 today alongside associated nausea with vomiting, chills, and diaphoresis. No blood in vomitusThe pt states that she was able to normally eat breakfast this morning and dinner last night. She reports that she still has her gallbladder. She denies being in the proximity of sick individuals recently. She specifically denies experiencing hematemesis, SOB, CP, diarrhea, or fever.   No h/o gall bladder issues. ______________________________________________________________________  DISCHARGE SUMMARY/HOSPITAL COURSE:  for full details see H&P, daily progress notes, labs, consult notes. Acute pancreatitis  POA  -Lipase > 3000 on admission, now down to WNL  -Triglycerides normal.  CT abd/pelvis:  Acute pancreatitis; possible developing necrosis in tail. Colonic diverticulosis, gallstones noted. U/S abd:  Cholelithiasis. No acute cholecystitis. Normal CBD. GI, Surgery consulted. No surgical intervention at this time. So far tolerating  full liquids, Lipase is normal  IV fluids  Empiric IV abx. C/w  meropenem. Lipase improved to normal. clinically improved. No fevers. Pain improved. Repeat CT abd/pelvis 10/29:  1. Diffuse peripancreatic stranding and fluid consistent with acute  pancreatitis.  No abscess or drainable fluid collection. 2. Diminished enhancement in the neck of the pancreas consistent with pancreatic  necrosis. 3. New bilateral pleural effusions and lower lobe atelectasis. 4. New ascites in the left lower quadrant and pelvis. 5. Atherosclerotic abdominal aorta without aneurysm. 6. Diverticulosis. CT reviewed by Surgery and GI. No surgical intervention at this time. WBC trending down, new CT is stable  Diarrhea: still having diarrhea, culture shows heavy growth of Pseudomonas, C. Diff is pending. Will D/c on Cipro for 5 days  Oral Thrush: start Nystatin. Bilateral pleural effusions/pulm edema   -likely due to volume overload. Asymptomatic.  -decreased IV fluids. Edema had decreased given lasix for 3 days  -cont incentive spirometry  Monitor closely one of the features of complicated pancreatitis is pleural effusion with high enzymatic content , I/O , weight daily  Lactic acidosis  So far resolved. -likely due to lower bp. Not septic at this time. No SIRS criteria. No fevers or hypothermia. RR and HR stable. Leukocytosis  -monitor, so far trending up now, has enough pathology in the pancreas to justify WBC elevation, c/w Meropenem, WBC up today again, will repeat CT abdomen and pelvis  Prediabetes  -A1c 6.1  HTN  Resume Norvasc and Bumex, D/c ACE   Hypokalemia: replace and monitor. HLD- hold meds  GERD  DVT prophylaxis: Heparin  Code status: Full code  Surrogate Decision Maker:    GI Prophylaxis: pepcid  Body mass index is 30.24 kg/m². D/c Home and F/U with PCP, GI and Surgery  _______________________________________________________________________  Patient seen and examined by me on discharge day. Pertinent Findings:  Gen:    Not in distress  Chest: Clear lungs  CVS:   Regular rhythm.   No edema  Abd:  Soft, not distended, mild tender  Neuro:  Alert, GCS 15  _______________________________________________________________________  DISCHARGE MEDICATIONS:   Current Discharge Medication List      START taking these medications    Details   ciprofloxacin HCl (CIPRO) 500 mg tablet Take 1 Tab by mouth every twelve (12) hours for 5 days. Qty: 10 Tab, Refills: 0      famotidine (PEPCID) 20 mg tablet Take 1 Tab by mouth two (2) times a day. Qty: 60 Tab, Refills: 1      L. acidoph & paracasei- S therm- Bifido (MICHAELLE-Q/RISAQUAD) 8 billion cell cap cap Take 1 Cap by mouth daily. Qty: 30 Cap, Refills: 1      nystatin (MYCOSTATIN) 100,000 unit/mL suspension Take 5 mL by mouth four (4) times daily. swish and spit  For 2 weeks  Qty: 473 mL, Refills: 0      ondansetron hcl (ZOFRAN) 8 mg tablet Take 1 Tab by mouth every eight (8) hours as needed for Nausea. Qty: 30 Tab, Refills: 0      oxyCODONE-acetaminophen (PERCOCET) 5-325 mg per tablet Take 1 Tab by mouth every six (6) hours as needed for Pain. Max Daily Amount: 4 Tabs. Qty: 20 Tab, Refills: 0    Associated Diagnoses: Acute biliary pancreatitis with uninfected necrosis         CONTINUE these medications which have CHANGED    Details   bumetanide (BUMEX) 0.5 mg tablet Take 1 Tab by mouth daily. Qty: 30 Tab, Refills: 1         CONTINUE these medications which have NOT CHANGED    Details   aspirin delayed-release 81 mg tablet Take 81 mg by mouth daily. multivitamin with iron (DAILY MULTI-VITAMINS/IRON) tablet Take 1 Tab by mouth daily. amLODIPine (NORVASC) 5 mg tablet Take 5 mg by mouth daily. Indications: HYPERTENSION      potassium chloride SR (KLOR-CON 10) 10 mEq tablet Take 20 mEq by mouth two (2) times a day. cycloSPORINE (RESTASIS) 0.05 % ophthalmic emulsion Administer 1 Drop to left eye two (2) times a day. Indications: DRY EYE      estradiol (ESTRACE) 0.5 mg tablet Take 0.5 mg by mouth every other day.          STOP taking these medications       omeprazole (PRILOSEC) 20 mg capsule Comments:   Reason for Stopping:         lisinopril (PRINIVIL, ZESTRIL) 40 mg tablet Comments:   Reason for Stopping:         atorvastatin (LIPITOR) 20 mg tablet Comments:   Reason for Stopping:               My Recommended Diet, Activity, Wound Care, and follow-up labs are listed in the patient's Discharge Insturctions which I have personally completed and reviewed. ______________________________________________________________________    Risk of deterioration: Moderate    Condition at Discharge:  Stable  ______________________________________________________________________    Disposition  Home with family, no needs  ______________________________________________________________________    Care Plan discussed with:   Patient, Family, RN, Care Manager, Consultant    ______________________________________________________________________    Code Status: Full Code  ______________________________________________________________________      Follow up with:   PCP : Dina Coronado MD  Follow-up Information     Follow up With Specialties Details Why Contact Info    Dina Coronado MD [facility]   [id number]  [phone]          F/U PCP  F/U GI DR. Kimberley Britt  F/U Surgery Dr. Maryuri Anand      Total time in minutes spent coordinating this discharge (includes going over instructions, follow-up, prescriptions, and preparing report for sign off to her PCP) :  35 minutes    Signed:  Zeb Lara MD

## 2018-11-03 NOTE — PROGRESS NOTES
D/C instructions reviewed with patient and spouse with verbalized understanding. Prescriptions given to patient's spouse to take to the pharmacy.

## 2018-11-03 NOTE — PROGRESS NOTES
No cm needs. d/c to home and spouse will transport.  has concerns about not having sufficient RNs on floor. Provided pt advocate number. 2nd letter given. Care Management Interventions PCP Verified by CM: Yes(sees dr chaparro once a year) Mode of Transport at Discharge: Other (see comment)( will transport to home at discharge) Transition of Care Consult (CM Consult): Discharge Planning Discharge Durable Medical Equipment: No(none at home) Physical Therapy Consult: No 
Occupational Therapy Consult: No 
Speech Therapy Consult: No 
Current Support Network: Own Home, Lives with Spouse(lives with  in a 1 story home with no steps into entrance) Confirm Follow Up Transport: Family( will assist with follow up appt if pt cannot drive self) Plan discussed with Pt/Family/Caregiver: Yes Discharge Location Discharge Placement: Home Joshua Hanna MSW 
ED Case Manager Ext [phone]

## 2018-11-07 LAB
O+P SPEC MICRO: NORMAL
O+P STL CONC: NORMAL
SPECIMEN SOURCE: NORMAL

## 2018-11-20 ENCOUNTER — OFFICE VISIT (OUTPATIENT)
Dept: SURGERY | Age: 73
End: 2018-11-20

## 2018-11-20 VITALS
OXYGEN SATURATION: 96 % | BODY MASS INDEX: 24.84 KG/M2 | SYSTOLIC BLOOD PRESSURE: 132 MMHG | WEIGHT: 135 LBS | HEIGHT: 62 IN | HEART RATE: 92 BPM | TEMPERATURE: 98.1 F | DIASTOLIC BLOOD PRESSURE: 77 MMHG | RESPIRATION RATE: 20 BRPM

## 2018-11-20 DIAGNOSIS — K80.20 GALLSTONES: Primary | ICD-10-CM

## 2018-11-20 RX ORDER — GLUCOSAMINE/CHONDR SU A SOD 750-600 MG
TABLET ORAL
COMMUNITY

## 2018-11-20 RX ORDER — AA/PROT/LYSINE/METHIO/VIT C/B6 50-12.5 MG
TABLET ORAL
COMMUNITY

## 2018-11-20 NOTE — PROGRESS NOTES
Chief Complaint Patient presents with  Gallbladder Attack  
  seen at the request of ER yaneth ferguson 1. Have you been to the ER, urgent care clinic since your last visit? Hospitalized since your last visit? New patient 2. Have you seen or consulted any other health care providers outside of the [address] [place] [address] since your last visit? Include any pap smears or colon screening. New patient Discussed advanced directive. Patient states that she does not have an advanced directive.

## 2018-11-30 NOTE — H&P (VIEW-ONLY)
To: Jacey Lyn MD 
CC:  Arleth Sahu MD , Alfonso Rowley MD   
 
From: Juliana Cerda MD 
 
Thank you for sending Nora Gibbons to see us. Encounter Date: 11/20/2018 History and Physical 
 
Assessment:  
Gallstones. Recent GS pancreatitis. Comorbid GERD, HTN. Body mass index is 24.69 kg/m². Status post hysterectomy. Uses baby aspirin. No stents or h/o MI. Plan/Recommendations/Medical Decision Making:  
Cholecystectomy. Discussed the risk of surgery including infection, hematoma, bleeding, bile duct or bowel injury, recurrence or persistence of symptoms, conversion to an open operation, retained CBD stones, possible CHARLIE drain, and the risks of general anesthetic. All questions were answered to the patient's satisfaction. HPI:  
Patient is a 68 y.o. female who was recently admitted with GS pancreatitis. She did well with supportive therapy and now presents here. Her US shows gallstones. She had bout of pain with n/v chills and sweats. Went to ER. Lipase >3000. Was treated with bowel rest and did well. Lipase normalized and pain went away. She is eating well now without sxs. No history of jaundice, rachel urine, acholic stools. Past Medical History:  
Diagnosis Date  Arthritis   
 osteoarthritis  Chronic allergic rhinitis  Chronic pain   
 right knee  Diabetes ([address] diet controlled  GERD (gastroesophageal reflux disease)  Hypercholesterolemia  Hypertension Past Surgical History:  
Procedure Laterality Date  HX ORTHOPAEDIC  9/18/13 RIGHT TOTAL KNEE ARTHROPLASTY  HX AISSATOU AND BSO  HX TUBAL LIGATION Social History Tobacco Use  Smoking status: Never Smoker  Smokeless tobacco: Never Used Substance Use Topics  Alcohol use: No  
  
Family History Problem Relation Age of Onset  Cancer Father   
     lung Current Outpatient Medications Medication Sig  Biotin 2,500 mcg cap Take  by mouth.  ascorbic acid (LETHA-C PO) Take  by mouth.  coenzyme q10 (CO Q-10) 10 mg cap Take  by mouth.  famotidine (PEPCID) 20 mg tablet Take 1 Tab by mouth two (2) times a day.  L. acidoph & paracasei- S therm- Bifido (MICHAELLE-Q/RISAQUAD) 8 billion cell cap cap Take 1 Cap by mouth daily.  bumetanide (BUMEX) 0.5 mg tablet Take 1 Tab by mouth daily.  aspirin delayed-release 81 mg tablet Take 81 mg by mouth daily.  multivitamin with iron (DAILY MULTI-VITAMINS/IRON) tablet Take 1 Tab by mouth daily.  amLODIPine (NORVASC) 5 mg tablet Take 5 mg by mouth daily. Indications: HYPERTENSION  potassium chloride SR (KLOR-CON 10) 10 mEq tablet Take 20 mEq by mouth two (2) times a day.  cycloSPORINE (RESTASIS) 0.05 % ophthalmic emulsion Administer 1 Drop to left eye two (2) times a day. Indications: DRY EYE  nystatin (MYCOSTATIN) 100,000 unit/mL suspension Take 5 mL by mouth four (4) times daily. swish and spit  For 2 weeks  ondansetron hcl (ZOFRAN) 8 mg tablet Take 1 Tab by mouth every eight (8) hours as needed for Nausea.  oxyCODONE-acetaminophen (PERCOCET) 5-325 mg per tablet Take 1 Tab by mouth every six (6) hours as needed for Pain. Max Daily Amount: 4 Tabs.  estradiol (ESTRACE) 0.5 mg tablet Take 0.5 mg by mouth every other day. No current facility-administered medications for this visit. Allergies: Allergies Allergen Reactions  Morphine (Pf) Vertigo  Oxycodone Nausea and Vomiting Review of Systems:  10 systems reviewed. See scanned sheet in \"Media\" section. See HPI for pertinent positives and negatives. Objective:  
 
Visit Vitals /77 (BP 1 Location: Right arm, BP Patient Position: Sitting) Pulse 92 Temp 98.1 °F (36.7 °C) Resp 20 Ht 5' 2\" (1.575 m) Wt 61.2 kg (135 lb) SpO2 96% BMI 24.69 kg/m² General appearance  Alert, cooperative, no distress, appears stated age HEENT  Anicteric Neck Supple Back   No CVA tenderness Lungs   CTAB Heart  Regular rate and rhythm. No murmur, rub or gallop Abdomen   Soft. Bowel sounds normal.  NT Extremities No CCE. Pulses 2+ right radial  
Skin Skin color, texture, turgor normal.  No juandice. Neurologic Without overt sensory or motor deficit Imaging and Lab Review:  
images and reports reviewed Signed By: Jamarcus Conway MD   
 November 30, 2018

## 2018-11-30 NOTE — PROGRESS NOTES
To: Luke Mckeon MD 
CC:  Yung Chauhan MD , Angie Cesar MD   
 
From: Lorenzo Almanzar MD 
 
Thank you for sending Alcides Barry to see us. Encounter Date: 11/20/2018 History and Physical 
 
Assessment:  
Gallstones. Recent GS pancreatitis. Comorbid GERD, HTN. Body mass index is 24.69 kg/m². Status post hysterectomy. Uses baby aspirin. No stents or h/o MI. Plan/Recommendations/Medical Decision Making:  
Cholecystectomy. Discussed the risk of surgery including infection, hematoma, bleeding, bile duct or bowel injury, recurrence or persistence of symptoms, conversion to an open operation, retained CBD stones, possible CHARLIE drain, and the risks of general anesthetic. All questions were answered to the patient's satisfaction. HPI:  
Patient is a 68 y.o. female who was recently admitted with GS pancreatitis. She did well with supportive therapy and now presents here. Her US shows gallstones. She had bout of pain with n/v chills and sweats. Went to ER. Lipase >3000. Was treated with bowel rest and did well. Lipase normalized and pain went away. She is eating well now without sxs. No history of jaundice, rachel urine, acholic stools. Past Medical History:  
Diagnosis Date  Arthritis   
 osteoarthritis  Chronic allergic rhinitis  Chronic pain   
 right knee  Diabetes ([address] diet controlled  GERD (gastroesophageal reflux disease)  Hypercholesterolemia  Hypertension Past Surgical History:  
Procedure Laterality Date  HX ORTHOPAEDIC  9/18/13 RIGHT TOTAL KNEE ARTHROPLASTY  HX AISSATOU AND BSO  HX TUBAL LIGATION Social History Tobacco Use  Smoking status: Never Smoker  Smokeless tobacco: Never Used Substance Use Topics  Alcohol use: No  
  
Family History Problem Relation Age of Onset  Cancer Father   
     lung Current Outpatient Medications Medication Sig  Biotin 2,500 mcg cap Take  by mouth.  ascorbic acid (LETHA-C PO) Take  by mouth.  coenzyme q10 (CO Q-10) 10 mg cap Take  by mouth.  famotidine (PEPCID) 20 mg tablet Take 1 Tab by mouth two (2) times a day.  L. acidoph & paracasei- S therm- Bifido (MICHAELLE-Q/RISAQUAD) 8 billion cell cap cap Take 1 Cap by mouth daily.  bumetanide (BUMEX) 0.5 mg tablet Take 1 Tab by mouth daily.  aspirin delayed-release 81 mg tablet Take 81 mg by mouth daily.  multivitamin with iron (DAILY MULTI-VITAMINS/IRON) tablet Take 1 Tab by mouth daily.  amLODIPine (NORVASC) 5 mg tablet Take 5 mg by mouth daily. Indications: HYPERTENSION  potassium chloride SR (KLOR-CON 10) 10 mEq tablet Take 20 mEq by mouth two (2) times a day.  cycloSPORINE (RESTASIS) 0.05 % ophthalmic emulsion Administer 1 Drop to left eye two (2) times a day. Indications: DRY EYE  nystatin (MYCOSTATIN) 100,000 unit/mL suspension Take 5 mL by mouth four (4) times daily. swish and spit  For 2 weeks  ondansetron hcl (ZOFRAN) 8 mg tablet Take 1 Tab by mouth every eight (8) hours as needed for Nausea.  oxyCODONE-acetaminophen (PERCOCET) 5-325 mg per tablet Take 1 Tab by mouth every six (6) hours as needed for Pain. Max Daily Amount: 4 Tabs.  estradiol (ESTRACE) 0.5 mg tablet Take 0.5 mg by mouth every other day. No current facility-administered medications for this visit. Allergies: Allergies Allergen Reactions  Morphine (Pf) Vertigo  Oxycodone Nausea and Vomiting Review of Systems:  10 systems reviewed. See scanned sheet in \"Media\" section. See HPI for pertinent positives and negatives. Objective:  
 
Visit Vitals /77 (BP 1 Location: Right arm, BP Patient Position: Sitting) Pulse 92 Temp 98.1 °F (36.7 °C) Resp 20 Ht 5' 2\" (1.575 m) Wt 61.2 kg (135 lb) SpO2 96% BMI 24.69 kg/m² General appearance  Alert, cooperative, no distress, appears stated age HEENT  Anicteric Neck Supple Back   No CVA tenderness Lungs   CTAB Heart  Regular rate and rhythm. No murmur, rub or gallop Abdomen   Soft. Bowel sounds normal.  NT Extremities No CCE. Pulses 2+ right radial  
Skin Skin color, texture, turgor normal.  No juandice. Neurologic Without overt sensory or motor deficit Imaging and Lab Review:  
images and reports reviewed Signed By: Antonio Chávez MD   
 November 30, 2018

## 2018-12-03 ENCOUNTER — HOSPITAL ENCOUNTER (OUTPATIENT)
Dept: CT IMAGING | Age: 73
Discharge: HOME | End: 2018-12-03
Attending: INTERNAL MEDICINE
Payer: MEDICARE

## 2018-12-03 DIAGNOSIS — K85.90 PANCREATITIS: ICD-10-CM

## 2018-12-03 DIAGNOSIS — K80.20 GALLSTONES: ICD-10-CM

## 2018-12-03 PROCEDURE — 74011636320 HC RX REV CODE- 636/320: Performed by: INTERNAL MEDICINE

## 2018-12-03 PROCEDURE — 74177 CT ABD & PELVIS W/CONTRAST: CPT

## 2018-12-03 PROCEDURE — 74011250636 HC RX REV CODE- 250/636: Performed by: INTERNAL MEDICINE

## 2018-12-03 RX ORDER — SODIUM CHLORIDE 9 MG/ML
50 INJECTION, SOLUTION INTRAVENOUS
Status: COMPLETED | OUTPATIENT
Start: 2018-12-03 | End: 2018-12-03

## 2018-12-03 RX ORDER — SODIUM CHLORIDE 0.9 % (FLUSH) 0.9 %
10 SYRINGE (ML) INJECTION
Status: COMPLETED | OUTPATIENT
Start: 2018-12-03 | End: 2018-12-03

## 2018-12-03 RX ADMIN — SODIUM CHLORIDE 50 ML/HR: 900 INJECTION, SOLUTION INTRAVENOUS at 15:19

## 2018-12-03 RX ADMIN — Medication 10 ML: at 15:19

## 2018-12-03 RX ADMIN — IOPAMIDOL 100 ML: 755 INJECTION, SOLUTION INTRAVENOUS at 15:19

## 2018-12-04 NOTE — PERIOP NOTES
[facility] Preoperative Instructions Surgery Date 12/5/18        Time of Arrival 9:15AM  
 
1. On the day of your surgery, please report to the Surgical Services Registration Desk and sign in at your designated time. The Surgery Center is located to the right of the Emergency Room. 2. You must have someone with you to drive you home. You should not drive a car for 24 hours following surgery. Please make arrangements for a friend or family member to stay with you for the first 24 hours after your surgery. 3. Do not have anything to eat or drink (including water, gum, mints, coffee, juice) after midnight 12/4/18  . ? This may not apply to medications prescribed by your physician. ?(Please note below the special instructions with medications to take the morning of your procedure.) 4. We recommend you do not drink any alcoholic beverages for 24 hours before and after your surgery. 5. Contact your surgeons office for instructions on the following medications: non-steroidal anti-inflammatory drugs (i.e. Advil, Aleve), vitamins, and supplements. (Some surgeons will want you to stop these medications prior to surgery and others may allow you to take them) **If you are currently taking Plavix, Coumadin, Aspirin and/or other blood-thinning agents, contact your surgeon for instructions. ** Your surgeon will partner with the physician prescribing these medications to determine if it is safe to stop or if you need to continue taking. Please do not stop taking these medications without instructions from your surgeon 6. Wear comfortable clothes. Wear glasses instead of contacts. Do not bring any money or jewelry. Please bring picture ID, insurance card, and any prearranged co-payment or hospital payment. Do not wear make-up, particularly mascara the morning of your surgery. Do not wear nail polish, particularly if you are having foot /hand surgery.   Wear your hair loose or down, no ponytails, buns, aracely pins or clips. All body piercings must be removed. Please shower with antibacterial soap for three consecutive days before and on the morning of surgery, but do not apply any lotions, powders or deodorants after the shower on the day of surgery. Please use a fresh towels after each shower. Please sleep in clean clothes and change bed linens the night before surgery. Please do not shave for 48 hours prior to surgery. Shaving of the face is acceptable. 7. You should understand that if you do not follow these instructions your surgery may be cancelled. If your physical condition changes (I.e. fever, cold or flu) please contact your surgeon as soon as possible. 8. It is important that you be on time. If a situation occurs where you may be late, please call [phone] (OR Holding Area). 9. If you have any questions and or problems, please call [phone] (Pre-admission Testing). 10. Your surgery time may be subject to change. You will receive a phone call the evening prior if your time changes. 11.  If having outpatient surgery, you must have someone to drive you here, stay with you during the duration of your stay, and to drive you home at time of discharge. 12.   In an effort to improve the efficiency, privacy, and safety for all of our Pre-op patients visitors are not allowed in the Holding area. Once you arrive and are registered your family/visitors will be asked to remain in the waiting room. The Pre-op staff will get you from the Surgical Waiting Area and will explain to you and your family/visitors that the Pre-op phase is beginning. The staff will answer any questions and provide instructions for tracking of the patient, by use of the existing tracking number and color-coded status board in the waiting room.   At this time the staff will also ask for your designated spokesperson information in the event that the physician or staff need to provide an update or obtain any pertinent information. The designated spokesperson will be notified if the physician needs to speak to family during the pre-operative phase. If at any time your family/visitors has questions or concerns they may approach the volunteer desk in the waiting area for assistance. Special Instructions: MEDICATIONS TO TAKE THE MORNING OF SURGERY WITH A SIP OF WATER: Amlodipine, Bumex. Also Pepcid if needed I understand a pre-operative phone call will be made to verify my surgery time. In the event that I am not available, I give permission for a message to be left on my answering service and/or with another person? Yes 
 
 
 
 ___________________      __________   _________ 
  (Signature of Patient)             (Witness)                (Date and Time)

## 2018-12-04 NOTE — PERIOP NOTES
LVM w Dr Zabala's nurse Chrissy Bears in regards to pt's last chemistry K 2.9. Pt stated PCP put her on Klor-Con BID for low potassium. Advised PAT added Chem8 AM DOS. Requested office call back PAT if additional orders are needed.

## 2018-12-05 ENCOUNTER — APPOINTMENT (OUTPATIENT)
Dept: GENERAL RADIOLOGY | Age: 73
End: 2018-12-05
Attending: SURGERY
Payer: MEDICARE

## 2018-12-05 ENCOUNTER — HOSPITAL ENCOUNTER (OUTPATIENT)
Age: 73
Setting detail: OUTPATIENT SURGERY
Discharge: HOME | End: 2018-12-05
Attending: SURGERY | Admitting: SURGERY
Payer: MEDICARE

## 2018-12-05 ENCOUNTER — ANESTHESIA (OUTPATIENT)
Dept: SURGERY | Age: 73
End: 2018-12-05
Payer: MEDICARE

## 2018-12-05 ENCOUNTER — ANESTHESIA EVENT (OUTPATIENT)
Dept: SURGERY | Age: 73
End: 2018-12-05
Payer: MEDICARE

## 2018-12-05 VITALS
HEIGHT: 62 IN | HEART RATE: 68 BPM | SYSTOLIC BLOOD PRESSURE: 110 MMHG | DIASTOLIC BLOOD PRESSURE: 58 MMHG | BODY MASS INDEX: 24.59 KG/M2 | RESPIRATION RATE: 16 BRPM | TEMPERATURE: 98.3 F | OXYGEN SATURATION: 98 % | WEIGHT: 133.6 LBS

## 2018-12-05 DIAGNOSIS — K80.20 GALL STONES: Primary | ICD-10-CM

## 2018-12-05 LAB
ANION GAP BLD CALC-SCNC: 18 MMOL/L (ref 10–20)
BUN BLD-MCNC: 14 MG/DL (ref 9–20)
CA-I BLD-MCNC: 1.14 MMOL/L (ref 1.12–1.32)
CHLORIDE BLD-SCNC: 103 MMOL/L (ref 98–107)
CO2 BLD-SCNC: 27 MMOL/L (ref 21–32)
CREAT BLD-MCNC: 0.4 MG/DL (ref 0.6–1.3)
GLUCOSE BLD STRIP.AUTO-MCNC: 161 MG/DL (ref 65–100)
GLUCOSE BLD-MCNC: 140 MG/DL (ref 65–100)
HCT VFR BLD CALC: 41 % (ref 35–47)
POTASSIUM BLD-SCNC: 3.5 MMOL/L (ref 3.5–5.1)
SERVICE CMNT-IMP: ABNORMAL
SERVICE CMNT-IMP: ABNORMAL
SODIUM BLD-SCNC: 143 MMOL/L (ref 136–145)

## 2018-12-05 PROCEDURE — 74300 X-RAY BILE DUCTS/PANCREAS: CPT

## 2018-12-05 PROCEDURE — 77030039266 HC ADH SKN EXOFIN S2SG -A: Performed by: SURGERY

## 2018-12-05 PROCEDURE — 77030035048 HC TRCR ENDOSC OPTCL COVD -B: Performed by: SURGERY

## 2018-12-05 PROCEDURE — 77030008756 HC TU IRR SUC STRY -B: Performed by: SURGERY

## 2018-12-05 PROCEDURE — 88304 TISSUE EXAM BY PATHOLOGIST: CPT

## 2018-12-05 PROCEDURE — 77030020782 HC GWN BAIR PAWS FLX 3M -B

## 2018-12-05 PROCEDURE — 77030019908 HC STETH ESOPH SIMS -A: Performed by: NURSE ANESTHETIST, CERTIFIED REGISTERED

## 2018-12-05 PROCEDURE — 77030004813 HC CATH CHOLGM TELE -B: Performed by: SURGERY

## 2018-12-05 PROCEDURE — 77030018836 HC SOL IRR NACL ICUM -A: Performed by: SURGERY

## 2018-12-05 PROCEDURE — 77030031139 HC SUT VCRL2 J&J -A: Performed by: SURGERY

## 2018-12-05 PROCEDURE — 74011250636 HC RX REV CODE- 250/636

## 2018-12-05 PROCEDURE — 76060000032 HC ANESTHESIA 0.5 TO 1 HR: Performed by: SURGERY

## 2018-12-05 PROCEDURE — 74011250636 HC RX REV CODE- 250/636: Performed by: ANESTHESIOLOGY

## 2018-12-05 PROCEDURE — 80047 BASIC METABLC PNL IONIZED CA: CPT

## 2018-12-05 PROCEDURE — 77030026438 HC STYL ET INTUB CARD -A: Performed by: NURSE ANESTHETIST, CERTIFIED REGISTERED

## 2018-12-05 PROCEDURE — 77030020053 HC ELECTRD LAPSCP COVD -B: Performed by: SURGERY

## 2018-12-05 PROCEDURE — 77030035045 HC TRCR ENDOSC VRSPRT BLDLSS COVD -B: Performed by: SURGERY

## 2018-12-05 PROCEDURE — 76000 FLUOROSCOPY <1 HR PHYS/QHP: CPT

## 2018-12-05 PROCEDURE — 77030011640 HC PAD GRND REM COVD -A: Performed by: SURGERY

## 2018-12-05 PROCEDURE — 76210000026 HC REC RM PH II 1 TO 1.5 HR: Performed by: SURGERY

## 2018-12-05 PROCEDURE — 74011636320 HC RX REV CODE- 636/320: Performed by: SURGERY

## 2018-12-05 PROCEDURE — 74011250636 HC RX REV CODE- 250/636: Performed by: SURGERY

## 2018-12-05 PROCEDURE — 77030035051: Performed by: SURGERY

## 2018-12-05 PROCEDURE — 76010000138 HC OR TIME 0.5 TO 1 HR: Performed by: SURGERY

## 2018-12-05 PROCEDURE — 77030032490 HC SLV COMPR SCD KNE COVD -B: Performed by: SURGERY

## 2018-12-05 PROCEDURE — 77030037892: Performed by: SURGERY

## 2018-12-05 PROCEDURE — 74011000250 HC RX REV CODE- 250

## 2018-12-05 PROCEDURE — 77030008684 HC TU ET CUF COVD -B: Performed by: NURSE ANESTHETIST, CERTIFIED REGISTERED

## 2018-12-05 PROCEDURE — 77030018875 HC APPL CLP LIG4 J&J -B: Performed by: SURGERY

## 2018-12-05 PROCEDURE — 77030002933 HC SUT MCRYL J&J -A: Performed by: SURGERY

## 2018-12-05 PROCEDURE — 76210000016 HC OR PH I REC 1 TO 1.5 HR: Performed by: SURGERY

## 2018-12-05 PROCEDURE — 82962 GLUCOSE BLOOD TEST: CPT

## 2018-12-05 PROCEDURE — 77030020256 HC SOL INJ NACL 0.9%  500ML: Performed by: SURGERY

## 2018-12-05 PROCEDURE — 74011000250 HC RX REV CODE- 250: Performed by: SURGERY

## 2018-12-05 RX ORDER — NEOSTIGMINE METHYLSULFATE 1 MG/ML
INJECTION INTRAVENOUS AS NEEDED
Status: DISCONTINUED | OUTPATIENT
Start: 2018-12-05 | End: 2018-12-05 | Stop reason: HOSPADM

## 2018-12-05 RX ORDER — ACETAMINOPHEN 10 MG/ML
INJECTION, SOLUTION INTRAVENOUS AS NEEDED
Status: DISCONTINUED | OUTPATIENT
Start: 2018-12-05 | End: 2018-12-05 | Stop reason: HOSPADM

## 2018-12-05 RX ORDER — IBUPROFEN 400 MG/1
400 TABLET ORAL
Qty: 21 TAB | Refills: 0 | Status: SHIPPED | OUTPATIENT
Start: 2018-12-05 | End: 2020-06-12

## 2018-12-05 RX ORDER — SODIUM CHLORIDE 0.9 % (FLUSH) 0.9 %
5-10 SYRINGE (ML) INJECTION AS NEEDED
Status: DISCONTINUED | OUTPATIENT
Start: 2018-12-05 | End: 2018-12-05 | Stop reason: HOSPADM

## 2018-12-05 RX ORDER — POLYETHYLENE GLYCOL 3350 17 G/17G
17 POWDER, FOR SOLUTION ORAL DAILY
Qty: 510 G | Refills: 0 | Status: SHIPPED | OUTPATIENT
Start: 2018-12-05 | End: 2020-06-12

## 2018-12-05 RX ORDER — PHENYLEPHRINE HCL IN 0.9% NACL 0.4MG/10ML
SYRINGE (ML) INTRAVENOUS AS NEEDED
Status: DISCONTINUED | OUTPATIENT
Start: 2018-12-05 | End: 2018-12-05 | Stop reason: HOSPADM

## 2018-12-05 RX ORDER — LIDOCAINE HYDROCHLORIDE 20 MG/ML
INJECTION, SOLUTION EPIDURAL; INFILTRATION; INTRACAUDAL; PERINEURAL AS NEEDED
Status: DISCONTINUED | OUTPATIENT
Start: 2018-12-05 | End: 2018-12-05 | Stop reason: HOSPADM

## 2018-12-05 RX ORDER — HYDROCODONE BITARTRATE AND ACETAMINOPHEN 5; 325 MG/1; MG/1
1-2 TABLET ORAL
Qty: 30 TAB | Refills: 0 | Status: SHIPPED | OUTPATIENT
Start: 2018-12-05 | End: 2019-01-03 | Stop reason: ALTCHOICE

## 2018-12-05 RX ORDER — SODIUM CHLORIDE, SODIUM LACTATE, POTASSIUM CHLORIDE, CALCIUM CHLORIDE 600; 310; 30; 20 MG/100ML; MG/100ML; MG/100ML; MG/100ML
25 INJECTION, SOLUTION INTRAVENOUS CONTINUOUS
Status: DISCONTINUED | OUTPATIENT
Start: 2018-12-05 | End: 2018-12-05 | Stop reason: HOSPADM

## 2018-12-05 RX ORDER — BUPIVACAINE HYDROCHLORIDE AND EPINEPHRINE 5; 5 MG/ML; UG/ML
INJECTION, SOLUTION EPIDURAL; INTRACAUDAL; PERINEURAL AS NEEDED
Status: DISCONTINUED | OUTPATIENT
Start: 2018-12-05 | End: 2018-12-05 | Stop reason: HOSPADM

## 2018-12-05 RX ORDER — SUCCINYLCHOLINE CHLORIDE 20 MG/ML
INJECTION INTRAMUSCULAR; INTRAVENOUS AS NEEDED
Status: DISCONTINUED | OUTPATIENT
Start: 2018-12-05 | End: 2018-12-05 | Stop reason: HOSPADM

## 2018-12-05 RX ORDER — GLYCOPYRROLATE 0.2 MG/ML
INJECTION INTRAMUSCULAR; INTRAVENOUS AS NEEDED
Status: DISCONTINUED | OUTPATIENT
Start: 2018-12-05 | End: 2018-12-05 | Stop reason: HOSPADM

## 2018-12-05 RX ORDER — FENTANYL CITRATE 50 UG/ML
25 INJECTION, SOLUTION INTRAMUSCULAR; INTRAVENOUS
Status: DISCONTINUED | OUTPATIENT
Start: 2018-12-05 | End: 2018-12-05 | Stop reason: HOSPADM

## 2018-12-05 RX ORDER — MIDAZOLAM HYDROCHLORIDE 1 MG/ML
INJECTION, SOLUTION INTRAMUSCULAR; INTRAVENOUS AS NEEDED
Status: DISCONTINUED | OUTPATIENT
Start: 2018-12-05 | End: 2018-12-05 | Stop reason: HOSPADM

## 2018-12-05 RX ORDER — KETOROLAC TROMETHAMINE 15 MG/ML
INJECTION, SOLUTION INTRAMUSCULAR; INTRAVENOUS AS NEEDED
Status: DISCONTINUED | OUTPATIENT
Start: 2018-12-05 | End: 2018-12-05 | Stop reason: HOSPADM

## 2018-12-05 RX ORDER — SODIUM CHLORIDE 0.9 % (FLUSH) 0.9 %
5-10 SYRINGE (ML) INJECTION EVERY 8 HOURS
Status: DISCONTINUED | OUTPATIENT
Start: 2018-12-05 | End: 2018-12-05 | Stop reason: HOSPADM

## 2018-12-05 RX ORDER — PROPOFOL 10 MG/ML
INJECTION, EMULSION INTRAVENOUS AS NEEDED
Status: DISCONTINUED | OUTPATIENT
Start: 2018-12-05 | End: 2018-12-05 | Stop reason: HOSPADM

## 2018-12-05 RX ORDER — PROPOFOL 10 MG/ML
INJECTION, EMULSION INTRAVENOUS
Status: DISCONTINUED | OUTPATIENT
Start: 2018-12-05 | End: 2018-12-05 | Stop reason: HOSPADM

## 2018-12-05 RX ORDER — CEFAZOLIN SODIUM/WATER 2 G/20 ML
2 SYRINGE (ML) INTRAVENOUS ONCE
Status: COMPLETED | OUTPATIENT
Start: 2018-12-05 | End: 2018-12-05

## 2018-12-05 RX ORDER — HYDROMORPHONE HYDROCHLORIDE 1 MG/ML
0.2 INJECTION, SOLUTION INTRAMUSCULAR; INTRAVENOUS; SUBCUTANEOUS
Status: DISCONTINUED | OUTPATIENT
Start: 2018-12-05 | End: 2018-12-05 | Stop reason: HOSPADM

## 2018-12-05 RX ORDER — DIPHENHYDRAMINE HYDROCHLORIDE 50 MG/ML
12.5 INJECTION, SOLUTION INTRAMUSCULAR; INTRAVENOUS AS NEEDED
Status: DISCONTINUED | OUTPATIENT
Start: 2018-12-05 | End: 2018-12-05 | Stop reason: HOSPADM

## 2018-12-05 RX ORDER — ROCURONIUM BROMIDE 10 MG/ML
INJECTION, SOLUTION INTRAVENOUS AS NEEDED
Status: DISCONTINUED | OUTPATIENT
Start: 2018-12-05 | End: 2018-12-05 | Stop reason: HOSPADM

## 2018-12-05 RX ORDER — EPHEDRINE SULFATE 50 MG/ML
INJECTION, SOLUTION INTRAVENOUS AS NEEDED
Status: DISCONTINUED | OUTPATIENT
Start: 2018-12-05 | End: 2018-12-05 | Stop reason: HOSPADM

## 2018-12-05 RX ORDER — ONDANSETRON 2 MG/ML
INJECTION INTRAMUSCULAR; INTRAVENOUS AS NEEDED
Status: DISCONTINUED | OUTPATIENT
Start: 2018-12-05 | End: 2018-12-05 | Stop reason: HOSPADM

## 2018-12-05 RX ORDER — LIDOCAINE HYDROCHLORIDE 10 MG/ML
0.1 INJECTION, SOLUTION EPIDURAL; INFILTRATION; INTRACAUDAL; PERINEURAL AS NEEDED
Status: DISCONTINUED | OUTPATIENT
Start: 2018-12-05 | End: 2018-12-05 | Stop reason: HOSPADM

## 2018-12-05 RX ORDER — DEXAMETHASONE SODIUM PHOSPHATE 4 MG/ML
INJECTION, SOLUTION INTRA-ARTICULAR; INTRALESIONAL; INTRAMUSCULAR; INTRAVENOUS; SOFT TISSUE AS NEEDED
Status: DISCONTINUED | OUTPATIENT
Start: 2018-12-05 | End: 2018-12-05 | Stop reason: HOSPADM

## 2018-12-05 RX ORDER — FENTANYL CITRATE 50 UG/ML
INJECTION, SOLUTION INTRAMUSCULAR; INTRAVENOUS AS NEEDED
Status: DISCONTINUED | OUTPATIENT
Start: 2018-12-05 | End: 2018-12-05 | Stop reason: HOSPADM

## 2018-12-05 RX ADMIN — LIDOCAINE HYDROCHLORIDE 60 MG: 20 INJECTION, SOLUTION EPIDURAL; INFILTRATION; INTRACAUDAL; PERINEURAL at 11:58

## 2018-12-05 RX ADMIN — SODIUM CHLORIDE, SODIUM LACTATE, POTASSIUM CHLORIDE, AND CALCIUM CHLORIDE 25 ML/HR: 600; 310; 30; 20 INJECTION, SOLUTION INTRAVENOUS at 10:21

## 2018-12-05 RX ADMIN — MIDAZOLAM HYDROCHLORIDE 1 MG: 1 INJECTION, SOLUTION INTRAMUSCULAR; INTRAVENOUS at 11:50

## 2018-12-05 RX ADMIN — NEOSTIGMINE METHYLSULFATE 3 MG: 1 INJECTION INTRAVENOUS at 12:39

## 2018-12-05 RX ADMIN — PROPOFOL 50 MCG/KG/MIN: 10 INJECTION, EMULSION INTRAVENOUS at 11:58

## 2018-12-05 RX ADMIN — Medication 80 MCG: at 12:13

## 2018-12-05 RX ADMIN — PROPOFOL 130 MG: 10 INJECTION, EMULSION INTRAVENOUS at 11:58

## 2018-12-05 RX ADMIN — ROCURONIUM BROMIDE 5 MG: 10 INJECTION, SOLUTION INTRAVENOUS at 11:58

## 2018-12-05 RX ADMIN — ACETAMINOPHEN 1000 MG: 10 INJECTION, SOLUTION INTRAVENOUS at 12:30

## 2018-12-05 RX ADMIN — ONDANSETRON 4 MG: 2 INJECTION INTRAMUSCULAR; INTRAVENOUS at 12:30

## 2018-12-05 RX ADMIN — SUCCINYLCHOLINE CHLORIDE 100 MG: 20 INJECTION INTRAMUSCULAR; INTRAVENOUS at 11:58

## 2018-12-05 RX ADMIN — FENTANYL CITRATE 50 MCG: 50 INJECTION, SOLUTION INTRAMUSCULAR; INTRAVENOUS at 12:53

## 2018-12-05 RX ADMIN — DEXAMETHASONE SODIUM PHOSPHATE 8 MG: 4 INJECTION, SOLUTION INTRA-ARTICULAR; INTRALESIONAL; INTRAMUSCULAR; INTRAVENOUS; SOFT TISSUE at 12:08

## 2018-12-05 RX ADMIN — EPHEDRINE SULFATE 10 MG: 50 INJECTION, SOLUTION INTRAVENOUS at 12:03

## 2018-12-05 RX ADMIN — FENTANYL CITRATE 25 MCG: 50 INJECTION, SOLUTION INTRAMUSCULAR; INTRAVENOUS at 13:39

## 2018-12-05 RX ADMIN — GLYCOPYRROLATE 0.6 MG: 0.2 INJECTION INTRAMUSCULAR; INTRAVENOUS at 12:39

## 2018-12-05 RX ADMIN — KETOROLAC TROMETHAMINE 15 MG: 15 INJECTION, SOLUTION INTRAMUSCULAR; INTRAVENOUS at 12:37

## 2018-12-05 RX ADMIN — FENTANYL CITRATE 100 MCG: 50 INJECTION, SOLUTION INTRAMUSCULAR; INTRAVENOUS at 11:58

## 2018-12-05 RX ADMIN — Medication 80 MCG: at 12:03

## 2018-12-05 RX ADMIN — ROCURONIUM BROMIDE 25 MG: 10 INJECTION, SOLUTION INTRAVENOUS at 12:08

## 2018-12-05 RX ADMIN — Medication 80 MCG: at 12:01

## 2018-12-05 RX ADMIN — Medication 2 G: at 12:03

## 2018-12-05 NOTE — PERIOP NOTES
10:10= declined Abelardo hugger; no mepilex applied due to short procedure duration. 11:04= Abelardo hugger applied for warmth. Pt was getting chilled.

## 2018-12-05 NOTE — ANESTHESIA POSTPROCEDURE EVALUATION
Procedure(s): LAPAROSCOPIC CHOLECYSTECTOMY WITH GRAMS. Anesthesia Post Evaluation Patient location during evaluation: PACU Note status: Adequate. Level of consciousness: responsive to verbal stimuli and sleepy but conscious Pain management: satisfactory to patient Airway patency: patent Anesthetic complications: no 
Cardiovascular status: acceptable Respiratory status: acceptable Hydration status: acceptable Comments: +Post-Anesthesia Evaluation and Assessment Patient: Jese Castillo MRN: [id number]  SSN: [id number] YOB: 1945  Age: 68 y.o. Sex: female Cardiovascular Function/Vital Signs /58 (BP 1 Location: Left arm, BP Patient Position: At rest;Head of bed elevated (Comment degrees))   Pulse 78   Temp 36.4 °C (97.5 °F)   Resp 20   Ht 5' 2\" (1.575 m)   Wt 60.6 kg (133 lb 9.6 oz)   SpO2 96%   BMI 24.44 kg/m² Patient is status post Procedure(s): LAPAROSCOPIC CHOLECYSTECTOMY WITH GRAMS. Nausea/Vomiting: Controlled. Postoperative hydration reviewed and adequate. Pain: 
Pain Scale 1: Numeric (0 - 10) (12/05/18 1345) Pain Intensity 1: 3 (12/05/18 1345) Managed. Neurological Status:  
Neuro (WDL): Exceptions to WDL (12/05/18 1250) At baseline. Mental Status and Level of Consciousness: Arousable. Pulmonary Status:  
O2 Device: Room air (12/05/18 1345) Adequate oxygenation and airway patent. Complications related to anesthesia: None Post-anesthesia assessment completed. No concerns. Signed By: Peter sEcoto MD  
 12/5/2018 Post anesthesia nausea and vomiting:  controlled Visit Vitals /58 (BP 1 Location: Left arm, BP Patient Position: At rest;Head of bed elevated (Comment degrees)) Pulse 78 Temp 36.4 °C (97.5 °F) Resp 20 Ht 5' 2\" (1.575 m) Wt 60.6 kg (133 lb 9.6 oz) SpO2 96% BMI 24.44 kg/m²

## 2018-12-05 NOTE — DISCHARGE INSTRUCTIONS
Discharge Instructions:  Laparoscopic Cholecystectomy (Gallbladder Removal)  Dr. Tessa Mercado    Call on next business day to arrange appointment for follow up in 3 weeks -- [phone]. Activity:  Walk regularly - can walk today as tolerated, but make yourself walk at least 50 yards at least 6 times per day starting tomorrow. No lifting more than 10 -15 pounds for 4 weeks. You may resume driving in 5-7 days unless still requiring narcotics for pain. Work:  You may return to work in 1 or 2 weeks to light activity. No lifting more than 10 pounds (=\"light duty\") for four weeks. If your employer can not accommodate \"light duty,\" your employer will need to provide any necessary paperwork to our office. This document with serve as the initial \"note\" to your employer. Diet:  You may resume normal diet after 24 hours. Fatty foods may still cause some stomach upset. Avoid fatty/greasy foods for 1 month, then add back slowly. Wound Care:  Dermabond glue dressing will fall off over the next couple of weeks. It is waterproof. You may shower, but no swimming or baths for 2 weeks. Your incisions may ooze for a few days. Do not worry about this. If you experience a lot of drainage, develop redness around the wound, or a fever over 101 F occurs please call the office. Medications:  See attached \"Medication Reconciliation. \"    Resume home medications as indicated on the Medical Reconciliation form. Do not use blood thinners (such as Aspirin, Coumadin, or Plavix) until 3 days after surgery. Pain medications:  Non steroidal antiinflammatories (ibuprofen -- Advil or Motrin) seem to work best for post surgical pain. Try these first.  A narcotic prescription will also be given for breakthrough pain. Do not use Tylenol while taking the narcotic because there is Tylenol in the narcotic pill, and too much Tylenol can injure your liver.        PUT ICE ON YOUR INCISIONS 20 MINUTES EVERY HOUR WHILE THEY REMAIN SORE. PLACE A CLOTH BETWEEN YOUR SKIN THE THE ICE BAG. Colace or Miralax should be used twice daily to prevent constipation while on narcotics. If you are still having trouble having a BM after 1-2 days, try milk of magnesia. If this does not work within 24 hours, try a bottle of magnesium citrate. Narcotics and anesthesia sometimes cause nausea and vomiting. If persistent please call the office. Do not hesitate to call with questions or concerns. Atul Cordon MD  [facility]. Tel. [phone]  Fax [phone] from Nurse    PATIENT INSTRUCTIONS:    After general anesthesia or intravenous sedation, for 24 hours or while taking prescription Narcotics:  · Limit your activities  · Do not drive and operate hazardous machinery  · Do not make important personal or business decisions  · Do  not drink alcoholic beverages  · If you have not urinated within 8 hours after discharge, please contact your surgeon on call. Report the following to your surgeon:  · Excessive pain, swelling, redness or odor of or around the surgical area  · Temperature over 100.5  · Nausea and vomiting lasting longer than 4 hours or if unable to take medications  · Any signs of decreased circulation or nerve impairment to extremity: change in color, persistent  numbness, tingling, coldness or increase pain  · Any questions    What to do at Home:  A common side effect of anesthesia following surgery is nausea and/or vomiting. In order to decrease symptoms, it is wise to avoid foods that are high in fat, greasy foods, milk products, and spicy foods for the first 24 hours. Acceptable foods for the first 24 hours following surgery include but are not limited to:     soup   broth    toast    crackers    applesauce    bananas    mashed potatoes,   soft or scrambled eggs   oatmeal    jello    It is important to eat when taking your pain medication.  This will help to prevent nausea. If possible, please try to time your meals with your medications. It is very important to stay hydrated following surgery. Sip fluids frequently while awake. Avoid acidic drinks such as citrus juices and soda for 24 hours. Carbonated beverages may cause bloating and gas. Acceptable fluids include:    - water (flavor packets may add variety)  - coffee or tea (in moderation)  - Gatorade  - Henry-aid  - apple juice  - cranberry juice    You are encouraged to cough and deep breathe every hour when awake. This will help to prevent respiratory complications following anesthesia. You may want to hug a pillow when coughing and sneezing to add additional support to the surgical area and to decrease discomfort if you had abdominal or chest surgery. If you are discharged home with support stockings, you may remove them after 24 hours. Support stockings are used to help prevent blood clots in the legs following surgery. Please take time to review all of your Home Care Instructions and Medication Information sheets provided in your discharge packet. If you have any questions, please contact your surgeons office. Thank you. How to Care for Your Wound After Its Treated With  DERMABOND* Topical Skin Adhesive  DERMABOND* Topical Skin Adhesive (2-octyl cyanoacrylate) is a sterile, liquid skin adhesive  that holds wound edges together. The film will usually remain in place for 5 to 10 days, then  naturally fall off your skin. The following will answer some of your questions and provide instructions for proper care for your  wound while it is healing:    CHECK WOUND APPEARANCE   Some swelling, redness, and pain are common with all wounds and normally will go away as the  wound heals. If swelling, redness, or pain increases or if the wound feels warm to the touch,  contact a doctor. Also contact a doctor if the wound edges reopen or separate.   REPLACE BANDAGES   If your wound is bandaged, keep the bandage dry.  Replace the dressing daily until the adhesive film has fallen off or if the  bandage should become wet, unless otherwise instructed by your  physician.  When changing the dressing, do not place tape directly over the  DERMABOND adhesive film, because removing the tape later may also  remove the film. AVOID TOPICAL MEDICATIONS   Do not apply liquid or ointment medications or any other product to your wound while the  DERMABOND adhesive film is in place. These may loosen the film before your wound is healed. KEEP WOUND DRY AND PROTECTED   You may occasionally and briefly wet your wound in the shower or bath. Do not soak or scrub  your wound, do not swim, and avoid periods of heavy perspiration until the DERMABOND  adhesive has naturally fallen off. After showering or bathing, gently blot your wound dry with a  soft towel. If a protective dressing is being used, apply a fresh, dry bandage, being sure to keep  the tape off the DERMABOND adhesive film.  Apply a clean, dry bandage over the wound if necessary to protect it.  Protect your wound from injury until the skin has had sufficient time to heal.   Do not scratch, rub, or pick at the DERMABOND adhesive film. This may loosen the film before  your wound is healed.  Protect the wound from prolonged exposure to sunlight or tanning lamps while the film is in  place. If you have any questions or concerns about this product, please consult your doctor. *Trademark ©[employer] 6922JQ PREVENT AN INFECTION      1. [address] YOUR HANDS     To prevent infection, good handwashing is the most important thing you or your caregiver can do.  Wash your hands with soap and water or use the hand  we gave you before you touch any wounds. 2. SHOWER     Use the antibacterial soap we gave you when you take a shower.  Shower with this soap until your wounds are healed.        To reach all areas of your body, you may need someone to help you.  Dont forget to clean your belly button with every shower. 3.  USE CLEAN SHEETS     Use freshly cleaned sheets on your bed after surgery.  To keep the surgery site clean, do not allow pets to sleep with you while your wound is still healing. 4. STOP SMOKING     Stop smoking, or at least cut back on smoking     Smoking slows your healing. 5.  CONTROL YOUR BLOOD SUGAR     High blood sugars slow wound healing. If you are diabetic, control your blood sugar levels before and after your surgery. Please carry medication information at all times in case of emergency situations. Narcotic-Analgesic/Acetaminophen (Percocet, Norco, Lorcet HD, Lortab 10/325) - (By mouth)   Why this medicine is used:   Relieves pain. Contact a nurse or doctor right away if you have:  · Extreme weakness, shallow breathing, slow heartbeat  · Severe confusion, lightheadedness, dizziness, fainting  · Yellow skin or eyes, dark urine or pale stools  · Severe constipation, severe stomach pain, nausea, vomiting, loss of appetite  · Sweating or cold, clammy skin     Common side effects:  · Mild constipation, nausea, vomiting  · Sleepiness, tiredness  · Itching, rash  © 2017 [employer] Information is for End User's use only and may not be sold, redistributed or otherwise used for commercial purposes. Ibuprofen (Advil, Advil Children's, Motrin, Children's Ibuprofen) - (By mouth)   Why this medicine is used:   Treats pain and fever. This medicine is an NSAID.   Contact a nurse or doctor right away if you have:  · Change in how much or how often you urinate  · Severe stomach pain, vomiting blood, bloody or black tarry stools  · Swelling in your hands, ankles, or feet; rapid weight gain     Common side effects:  · Constipation, diarrhea, gas, mild upset stomach  · Ringing in your ears, dizziness, headache  © 2017 [employer] Information is for End User's use only and may not be sold, redistributed or otherwise used for commercial purposes. Polyethylene Glycol 3350 (MiraLAX, Healthylax Polyethylene Glycol 3350, GaviLAX, Leader ClearLax) - (By mouth)   Why this medicine is used:   Treats occasional constipation. Contact a nurse or doctor right away if you have:  · Severe stomach pain, bloating, vomiting, diarrhea     Common side effects:  · Mild cramps, bloating, diarrhea, gas  © 2017 [address]  Information is for End User's use only and may not be sold, redistributed or otherwise used for commercial purposes. No smoking/ No tobacco products/ Avoid exposure to second hand smoke  Surgeon General's Warning:  Quitting smoking now greatly reduces serious risk to your health. Obesity, smoking, and sedentary lifestyle greatly increases your risk for illness    A healthy diet, regular physical exercise & weight monitoring are important for maintaining a healthy lifestyle    You may be retaining fluid if you have a history of heart failure or if you experience any of the following symptoms:  Weight gain of 3 pounds or more overnight or 5 pounds in a week, increased swelling in our hands or feet or shortness of breath while lying flat in bed. Please call your doctor as soon as you notice any of these symptoms; do not wait until your next office visit. Recognize signs and symptoms of STROKE:    F-face looks uneven    A-arms unable to move or move unevenly    S-speech slurred or non-existent    T-time-call 911 as soon as signs and symptoms begin-DO NOT go       Back to bed or wait to see if you get better-TIME IS BRAIN. Warning Signs of HEART ATTACK     Call 911 if you have these symptoms:   Chest discomfort. Most heart attacks involve discomfort in the center of the chest that lasts more than a few minutes, or that goes away and comes back. It can feel like uncomfortable pressure, squeezing, fullness, or pain.  Discomfort in other areas of the upper body. Symptoms can include pain or discomfort in one or both arms, the back, neck, jaw, or stomach.  Shortness of breath with or without chest discomfort.  Other signs may include breaking out in a cold sweat, nausea, or lightheadedness. Don't wait more than five minutes to call 911 - MINUTES MATTER! Fast action can save your life. Calling 911 is almost always the fastest way to get lifesaving treatment. Emergency Medical Services staff can begin treatment when they arrive -- up to an hour sooner than if someone gets to the hospital by car. The discharge information has been reviewed with the patient and spouse. The patient and spouse verbalized understanding. Discharge medications reviewed with the patient and spouse and appropriate educational materials and side effects teaching were provided.   ___________________________________________________________________________________________________________________________________

## 2018-12-05 NOTE — ANESTHESIA PREPROCEDURE EVALUATION
Anesthetic History PONV Review of Systems / Medical History Patient summary reviewed, nursing notes reviewed and pertinent labs reviewed Pulmonary Within defined limits Neuro/Psych Within defined limits Cardiovascular Hypertension Exercise tolerance: >4 METS 
  
GI/Hepatic/Renal 
  
GERD Endo/Other Diabetes Arthritis Other Findings Physical Exam 
 
Airway Mallampati: I 
TM Distance: 4 - 6 cm Neck ROM: normal range of motion Mouth opening: Normal 
 
 Cardiovascular Regular rate and rhythm,  S1 and S2 normal,  no murmur, click, rub, or gallop Dental 
No notable dental hx Pulmonary Breath sounds clear to auscultation Abdominal 
GI exam deferred Other Findings Anesthetic Plan ASA: 2 Anesthesia type: general 
 
Monitoring Plan: BIS Induction: Intravenous Anesthetic plan and risks discussed with: Patient

## 2018-12-05 NOTE — PERIOP NOTES
Handoff Report from Operating Room to PACU Report received from NEGRA Harding RN and BAYRON Rivera regarding Debo Fus. Surgeon(s): 
Yadira Sweeney MD  And Procedure(s) (LRB): 
LAPAROSCOPIC CHOLECYSTECTOMY WITH GRAMS (N/A)  confirmed  
with allergies, dressings and local anesthetic discussed. Anesthesia type, drugs, patient history, complications, estimated blood loss, vital signs, intake and output, and last pain medication, lines, reversal medications and temperature were reviewed.

## 2018-12-05 NOTE — INTERVAL H&P NOTE
H&P Update: 
Yamil Woods was seen and examined. History and physical has been reviewed. The patient has been examined.  There have been no significant clinical changes since the completion of the originally dated History and Physical. 
 
Signed By: Lou Luke MD   
 December 5, 2018 11:23 AM

## 2018-12-05 NOTE — PERIOP NOTES
Called to [facility] to attempt to update  but he was not there. Called on cell phone to receive voice mail message. Message cliffft asking him to return to [facility] for discharge of patient in the near future.

## 2018-12-05 NOTE — OP NOTES
DATE OF PROCEDURE:  12/5/2018     PREOPERATIVE DIAGNOSIS:   Symptomatic gallstones, previous pancreatitis    POSTOPERATIVE DIAGNOSIS:   Same    OPERATIVE PROCEDURE:  Laparoscopic cholecystectomy with cholangiogram, interpretation of cholangiogram (CPT 43616 95914)    SURGEON: Chary Hinojosa MD    ANESTHESIA:  General endotracheal.    EBL: minimal    SPECIMENS:   ID Type Source Tests Collected by Time Destination   1 : gallbladder Preservative Gallbladder  Tushar Gitelman, MD 12/5/2018 1232 Pathology        FINDINGS:  Mild inflammation of the gallbladder, gallstones. No CBD filling defect on cholangiogram.    INDICATIONS:  Prior admission for gallstone pancreatitis. Episodic RUQ and epigastric pain. Gallstones on ultrasound. DESCRIPTION OF PROCEDURE:  After obtaining informed consent, the patient was taken to the operating room and placed supine on the operating table. An operative time-out was performed, and general endotracheal anesthesia was induced.  Preoperative antibiotics were administered, and the abdomen was prepped and draped in the usual sterile fashion.  The abdomen was then entered just above the umbilicus using a 5-mm optical trocar.  The abdomen was the insufflated without incident and was visually explored. Cecillia Fujita was no other pathology noted.  Two right upper quadrant 5-mm ports were placed under direct vision, followed by a 12-mm port in the subxiphoid position.  30 mL of Marcaine 0.5% with epinephrine was infiltrated at the port sites prior to placement. The gallbladder fundus was then grasped and retracted cephalad over the liver.  The triangle of Calot was exposed, and the cystic duct and artery were skeletonized using a combination of blunt dissection with a Maryland dissector and hook electrocautery.  The cystic artery was then divided between clips with 2 clips left on the patient side.   The cystic duct was then traced from the gallbladder infundibulum into its insertion into the portal triad.  The cystic duct was swept with a grasper up into the gallbladder. A clip was placed on the gallbladder infundibulum and an incision in the cystic duct adjacent to the gallbladder infundibulum was made with jillian and the cholangiogram catheter was inserted and the cholangiogram was per performed with the above noted findings. The cystic duct was then divided between clips directly adjacent to the gallbladder infundibulum, with 3 clips left on the patient's side. The gallbladder was then removed from the liver bed using hook electrocautery.  It was removed from the abdominal cavity using a bag through the subxiphoid incision. The incision had to be lengthened in order to remove the gallbladder with its very large stones.  The liver bed was inspected for hemostasis, and excellent hemostasis was achieved with minimal electrocautery.  The clips on the cystic duct and artery were again visualized and were intact.  The area below and lateral to the liver was suction irrigated until clear. The right upper quadrant ports were removed under direct vision and both were hemostatic.  The abdomen was then desufflated through the subxiphoid port under direct vision via the umbilical port.  These ports were subsequently removed and the fascial defect at the 12-mm incision was closed with an interrupted 0 Vicryl suture.  The incisions were irrigated with sterile saline and made hemostatic with minimal electrocautery. Bartholome Cline were closed with buried interrupted 4-0 Monocryl sutures, and dressed with sterile dressing.  The patient was recovered from general anesthesia and taken to the recovery area in satisfactory condition.   All instrument, sponge, and needle counts were reported as correct.     Rigo Hedrick MD

## 2019-01-03 ENCOUNTER — OFFICE VISIT (OUTPATIENT)
Dept: SURGERY | Age: 74
End: 2019-01-03

## 2019-01-03 VITALS
TEMPERATURE: 98.4 F | HEART RATE: 82 BPM | BODY MASS INDEX: 24.53 KG/M2 | RESPIRATION RATE: 15 BRPM | WEIGHT: 133.3 LBS | OXYGEN SATURATION: 97 % | DIASTOLIC BLOOD PRESSURE: 73 MMHG | SYSTOLIC BLOOD PRESSURE: 125 MMHG | HEIGHT: 62 IN

## 2019-01-03 DIAGNOSIS — Z09 POSTOPERATIVE EXAMINATION: Primary | ICD-10-CM

## 2019-01-03 NOTE — PROGRESS NOTES
To: Fern Rocha MD 
From: Parth Salguero MD 
 
Thank you for referring Miguelangel Womack. Encounter Date: 1/3/2019 Subjective:  
  
Clinton Guy is a 68 y.o. female presents for postop care. Has no complaints today. Preoperative symptoms are resolved. Eating a regular diet without difficulty. Bowel movements are regular. Objective:  
 
General:  alert, cooperative, no distress, appears stated age Abdomen: soft, bowel sounds active, non-tender Incision(s):  healing well, no drainage, no erythema, no hernia, no seroma, no swelling, no dehiscence, incision well approximated. Assessment: S/p laparoscopic cholecystectomy. Pathology revealed gallstones. No CBD filling defects on intraoperative cholangiogram.  
Doing well postoperatively. Plan:  
 
Patient understands no further follow-up required. Told to call for any concerns.  
 
 
Parth Salguero MD

## 2019-01-03 NOTE — PROGRESS NOTES
Chief Complaint Patient presents with  Surgical Follow-up Cholecystectomy 12/5/18 1. Have you been to the ER, urgent care clinic since your last visit? Hospitalized since your last visit? No 
 
2. Have you seen or consulted any other health care providers outside of the [address] [place] [address] since your last visit? Include any pap smears or colon screening.  No

## 2020-06-02 ENCOUNTER — HOSPITAL ENCOUNTER (OUTPATIENT)
Dept: PREADMISSION TESTING | Age: 75
Discharge: HOME | End: 2020-06-02
Attending: ORTHOPAEDIC SURGERY
Payer: MEDICARE

## 2020-06-02 VITALS
OXYGEN SATURATION: 99 % | SYSTOLIC BLOOD PRESSURE: 120 MMHG | DIASTOLIC BLOOD PRESSURE: 54 MMHG | BODY MASS INDEX: 27.4 KG/M2 | WEIGHT: 139.55 LBS | HEIGHT: 60 IN | HEART RATE: 71 BPM | RESPIRATION RATE: 16 BRPM | TEMPERATURE: 97.9 F

## 2020-06-02 LAB
ABO + RH BLD: NORMAL
ALBUMIN SERPL-MCNC: 3.5 G/DL (ref 3.5–5)
ALBUMIN/GLOB SERPL: 0.9 {RATIO} (ref 1.1–2.2)
ALP SERPL-CCNC: 92 U/L (ref 45–117)
ALT SERPL-CCNC: 19 U/L (ref 12–78)
ANION GAP SERPL CALC-SCNC: 3 MMOL/L (ref 5–15)
APPEARANCE UR: CLEAR
AST SERPL-CCNC: 17 U/L (ref 15–37)
ATRIAL RATE: 63 BPM
BACTERIA URNS QL MICRO: NEGATIVE /HPF
BILIRUB SERPL-MCNC: 0.4 MG/DL (ref 0.2–1)
BILIRUB UR QL: NEGATIVE
BLOOD GROUP ANTIBODIES SERPL: NORMAL
BUN SERPL-MCNC: 19 MG/DL (ref 6–20)
BUN/CREAT SERPL: 30 (ref 12–20)
CALCIUM SERPL-MCNC: 8.7 MG/DL (ref 8.5–10.1)
CALCULATED P AXIS, ECG09: 49 DEGREES
CALCULATED R AXIS, ECG10: -22 DEGREES
CALCULATED T AXIS, ECG11: 28 DEGREES
CHLORIDE SERPL-SCNC: 109 MMOL/L (ref 97–108)
CO2 SERPL-SCNC: 28 MMOL/L (ref 21–32)
COLOR UR: ABNORMAL
CREAT SERPL-MCNC: 0.63 MG/DL (ref 0.55–1.02)
DIAGNOSIS, 93000: NORMAL
EPITH CASTS URNS QL MICRO: ABNORMAL /LPF
ERYTHROCYTE [DISTWIDTH] IN BLOOD BY AUTOMATED COUNT: 12.5 % (ref 11.5–14.5)
EST. AVERAGE GLUCOSE BLD GHB EST-MCNC: 134 MG/DL
GLOBULIN SER CALC-MCNC: 4 G/DL (ref 2–4)
GLUCOSE SERPL-MCNC: 117 MG/DL (ref 65–100)
GLUCOSE UR STRIP.AUTO-MCNC: NEGATIVE MG/DL
HBA1C MFR BLD: 6.3 % (ref 4–5.6)
HCT VFR BLD AUTO: 42.9 % (ref 35–47)
HGB BLD-MCNC: 13.8 G/DL (ref 11.5–16)
HGB UR QL STRIP: NEGATIVE
HYALINE CASTS URNS QL MICRO: ABNORMAL /LPF (ref 0–5)
INR PPP: 1 (ref 0.9–1.1)
KETONES UR QL STRIP.AUTO: NEGATIVE MG/DL
LEUKOCYTE ESTERASE UR QL STRIP.AUTO: ABNORMAL
MCH RBC QN AUTO: 30.2 PG (ref 26–34)
MCHC RBC AUTO-ENTMCNC: 32.2 G/DL (ref 30–36.5)
MCV RBC AUTO: 93.9 FL (ref 80–99)
NITRITE UR QL STRIP.AUTO: NEGATIVE
NRBC # BLD: 0 K/UL (ref 0–0.01)
NRBC BLD-RTO: 0 PER 100 WBC
P-R INTERVAL, ECG05: 158 MS
PH UR STRIP: 7 [PH] (ref 5–8)
PLATELET # BLD AUTO: 317 K/UL (ref 150–400)
PMV BLD AUTO: 10 FL (ref 8.9–12.9)
POTASSIUM SERPL-SCNC: 4 MMOL/L (ref 3.5–5.1)
PROT SERPL-MCNC: 7.5 G/DL (ref 6.4–8.2)
PROT UR STRIP-MCNC: NEGATIVE MG/DL
PROTHROMBIN TIME: 10.6 SEC (ref 9–11.1)
Q-T INTERVAL, ECG07: 414 MS
QRS DURATION, ECG06: 74 MS
QTC CALCULATION (BEZET), ECG08: 423 MS
RBC # BLD AUTO: 4.57 M/UL (ref 3.8–5.2)
RBC #/AREA URNS HPF: ABNORMAL /HPF (ref 0–5)
SODIUM SERPL-SCNC: 140 MMOL/L (ref 136–145)
SP GR UR REFRACTOMETRY: 1.01 (ref 1–1.03)
SPECIMEN EXP DATE BLD: NORMAL
UA: UC IF INDICATED,UAUC: ABNORMAL
UROBILINOGEN UR QL STRIP.AUTO: 0.2 EU/DL (ref 0.2–1)
VENTRICULAR RATE, ECG03: 63 BPM
WBC # BLD AUTO: 7 K/UL (ref 3.6–11)
WBC URNS QL MICRO: ABNORMAL /HPF (ref 0–4)

## 2020-06-02 PROCEDURE — 85610 PROTHROMBIN TIME: CPT

## 2020-06-02 PROCEDURE — 93005 ELECTROCARDIOGRAM TRACING: CPT

## 2020-06-02 PROCEDURE — 85027 COMPLETE CBC AUTOMATED: CPT

## 2020-06-02 PROCEDURE — 86900 BLOOD TYPING SEROLOGIC ABO: CPT

## 2020-06-02 PROCEDURE — 83036 HEMOGLOBIN GLYCOSYLATED A1C: CPT

## 2020-06-02 PROCEDURE — 81001 URINALYSIS AUTO W/SCOPE: CPT

## 2020-06-02 PROCEDURE — 80053 COMPREHEN METABOLIC PANEL: CPT

## 2020-06-02 PROCEDURE — 36415 COLL VENOUS BLD VENIPUNCTURE: CPT

## 2020-06-02 RX ORDER — ZINC GLUCONATE 50 MG
500 TABLET ORAL DAILY
COMMUNITY

## 2020-06-02 RX ORDER — ACETAMINOPHEN 500 MG
1000 TABLET ORAL ONCE
Status: CANCELLED | OUTPATIENT
Start: 2020-06-11 | End: 2020-06-11

## 2020-06-02 RX ORDER — SODIUM CHLORIDE, SODIUM LACTATE, POTASSIUM CHLORIDE, CALCIUM CHLORIDE 600; 310; 30; 20 MG/100ML; MG/100ML; MG/100ML; MG/100ML
25 INJECTION, SOLUTION INTRAVENOUS CONTINUOUS
Status: CANCELLED | OUTPATIENT
Start: 2020-06-11

## 2020-06-02 RX ORDER — LANOLIN ALCOHOL/MO/W.PET/CERES
5000 CREAM (GRAM) TOPICAL DAILY
COMMUNITY

## 2020-06-02 RX ORDER — PREGABALIN 75 MG/1
75 CAPSULE ORAL ONCE
Status: CANCELLED | OUTPATIENT
Start: 2020-06-11 | End: 2020-06-11

## 2020-06-02 RX ORDER — FEXOFENADINE HYDROCHLORIDE AND PSEUDOEPHEDRINE HYDROCHLORIDE 180; 240 MG/1; MG/1
1 TABLET, FILM COATED, EXTENDED RELEASE ORAL DAILY
COMMUNITY

## 2020-06-02 RX ORDER — CELECOXIB 200 MG/1
400 CAPSULE ORAL ONCE
Status: CANCELLED | OUTPATIENT
Start: 2020-06-11 | End: 2020-06-11

## 2020-06-02 RX ORDER — BIOTIN 1 MG
800 TABLET ORAL DAILY
COMMUNITY

## 2020-06-02 RX ORDER — MELATONIN
1000 DAILY
COMMUNITY

## 2020-06-02 NOTE — ADVANCED PRACTICE NURSE
PAT Nurse Practitioner   Pre-Operative Chart Review/Assessment:-ORTHOPEDIC/NEUROSURGICAL SPINE                Patient Name:  Williams Lucero                                                         Age:   76 y.o.    :  1945     Today's Date:  2020     Date of PAT:   20      Date of Surgery:    20     Procedure(s):  Left total knee arthroplasty     Surgeon:   Jaiden Santiago     Medical Clearance:  Dr. Hatch [employer]:      1)  Cardiac Clearance:  Not required       2)  Diabetic Treatment Consult:  Not indicated-A1C 6.3      3)  Sleep Apnea evaluation:   Not indicated-PATRICE 2      4) Treatment for MRSA/Staph Aureus:  Negative       5) Additional Concerns:  Diet controlled diabetes, GERD, PONV, chronic pain                 Vital Signs:         Vitals:    20 0803   BP: 120/54   Pulse: 71   Resp: 16   Temp: 97.9 °F (36.6 °C)   SpO2: 99%   Weight: 63.3 kg (139 lb 8.8 oz)   Height: 4' 11.75\" (1.518 m)            ____________________________________________  PAST MEDICAL HISTORY  Past Medical History:   Diagnosis Date    Arthritis     osteoarthritis    Chronic allergic rhinitis     Chronic pain     right knee    Diabetes (HCC)     diet controlled    GERD (gastroesophageal reflux disease)     Hypercholesterolemia     Hypertension     Nausea & vomiting     Pancreatitis       ____________________________________________  PAST SURGICAL HISTORY  Past Surgical History:   Procedure Laterality Date    ABDOMEN SURGERY PROC UNLISTED  2019    gallbladder    HX ORTHOPAEDIC  13    RIGHT TOTAL KNEE ARTHROPLASTY    HX AISSATOU AND BSO      HX TUBAL LIGATION        ____________________________________________  HOME MEDICATIONS    Current Outpatient Medications   Medication Sig    fexofenadine-pseudoephedrine (Allegra-D 24 Hour) 180-240 mg per tablet Take 1 Tab by mouth daily.  cholecalciferol (Vitamin D3) (1000 Units /25 mcg) tablet Take 1,000 Units by mouth daily.     vit B Cmplx 3-FA-Vit C-Biotin (NEPHRO DONNELL RX) 1- mg-mg-mcg tablet Take 1 Tab by mouth daily.  ZINC ACETATE PO Take 500 mg by mouth daily.  cyanocobalamin (Vitamin B-12) 1,000 mcg tablet Take 5,000 mcg by mouth daily.  Magnesium Oxide 500 mg cap Take 500 mg by mouth daily.  chromium picolinate 400 mcg tab Take 800 mg by mouth daily.  ISRAEL ROOT, BULK, Take 550 mg by mouth daily.  ibuprofen (MOTRIN) 400 mg tablet Take 1 Tab by mouth three (3) times daily (with meals). May use over-the-counter equivalent instead.  polyethylene glycol (MIRALAX) 17 gram/dose powder Take 17 g by mouth daily.  Biotin 2,500 mcg cap Take  by mouth.  ascorbic acid (LETHA-C PO) Take  by mouth.  coenzyme q10 (CO Q-10) 10 mg cap Take  by mouth.  famotidine (PEPCID) 20 mg tablet Take 1 Tab by mouth two (2) times a day.  L. acidoph & paracasei- S therm- Bifido (MICHAELLE-Q/RISAQUAD) 8 billion cell cap cap Take 1 Cap by mouth daily.  bumetanide (BUMEX) 0.5 mg tablet Take 1 Tab by mouth daily.  aspirin delayed-release 81 mg tablet Take 81 mg by mouth daily.  amLODIPine (NORVASC) 5 mg tablet Take 5 mg by mouth daily. Indications: HYPERTENSION    potassium chloride SR (KLOR-CON 10) 10 mEq tablet Take 20 mEq by mouth two (2) times a day.  cycloSPORINE (RESTASIS) 0.05 % ophthalmic emulsion Administer 1 Drop to left eye two (2) times a day.  Indications: DRY EYE     No current facility-administered medications for this encounter.       ____________________________________________  ALLERGIES  Allergies   Allergen Reactions    Morphine (Pf) Vertigo    Oxycodone Nausea and Vomiting      ____________________________________________  SOCIAL HISTORY  Social History     Tobacco Use    Smoking status: Never Smoker    Smokeless tobacco: Never Used   Substance Use Topics    Alcohol use: No      ____________________________________________        Labs:     Results for Shekhar Parikh (MRN [id number]) as of 6/2/2020 11:06   Ref.  Range 6/2/2020 08:25 6/2/2020 08:32   WBC Latest Ref Range: 3.6 - 11.0 K/uL 7.0    NRBC Latest Ref Range: 0  WBC 0.0    RBC Latest Ref Range: 3.80 - 5.20 M/uL 4.57    HGB Latest Ref Range: 11.5 - 16.0 g/dL 13.8    HCT Latest Ref Range: 35.0 - 47.0 % 42.9    MCV Latest Ref Range: 80.0 - 99.0 FL 93.9    MCH Latest Ref Range: 26.0 - 34.0 PG 30.2    MCHC Latest Ref Range: 30.0 - 36.5 g/dL 32.2    RDW Latest Ref Range: 11.5 - 14.5 % 12.5    PLATELET Latest Ref Range: 150 - 400 K/uL 317    MPV Latest Ref Range: 8.9 - 12.9 FL 10.0    ABSOLUTE NRBC Latest Ref Range: 0.00 - 0.01 K/uL 0.00    Color Latest Units:   YELLOW/STRAW    Appearance Latest Ref Range: CLEAR   CLEAR    Specific gravity Latest Ref Range: 1.003 - 1.030   1.012    pH (UA) Latest Ref Range: 5.0 - 8.0   7.0    Protein Latest Ref Range: NEG mg/dL Negative    Glucose Latest Ref Range: NEG mg/dL Negative    Ketone Latest Ref Range: NEG mg/dL Negative    Blood Latest Ref Range: NEG   Negative    Bilirubin Latest Ref Range: NEG   Negative    Urobilinogen Latest Ref Range: 0.2 - 1.0 EU/dL 0.2    Nitrites Latest Ref Range: NEG   Negative    Leukocyte Esterase Latest Ref Range: NEG   LARGE (A)    Epithelial cells Latest Ref Range: FEW /lpf FEW    WBC Latest Ref Range: 0 - 4 /hpf 5-10    RBC Latest Ref Range: 0 - 5 /hpf 0-5    Bacteria Latest Ref Range: NEG /hpf Negative    Hyaline cast Latest Ref Range: 0 - 5 /lpf 0-2    INR Latest Ref Range: 0.9 - 1.1   1.0    Prothrombin time Latest Ref Range: 9.0 - 11.1 sec 10.6    Sodium Latest Ref Range: 136 - 145 mmol/L 140    Potassium Latest Ref Range: 3.5 - 5.1 mmol/L 4.0    Chloride Latest Ref Range: 97 - 108 mmol/L 109 (H)    CO2 Latest Ref Range: 21 - 32 mmol/L 28    Anion gap Latest Ref Range: 5 - 15 mmol/L 3 (L)    Glucose Latest Ref Range: 65 - 100 mg/dL 117 (H)    BUN Latest Ref Range: 6 - 20 MG/DL 19    Creatinine Latest Ref Range: 0.55 - 1.02 MG/DL 0.63    BUN/Creatinine ratio Latest Ref Range: 12 - 20   30 (H)    Calcium Latest Ref Range: 8.5 - 10.1 MG/DL 8.7    GFR est non-AA Latest Ref Range: >60 ml/min/1.73m2 >60    GFR est AA Latest Ref Range: >60 ml/min/1.73m2 >60    Bilirubin, total Latest Ref Range: 0.2 - 1.0 MG/DL 0.4    Protein, total Latest Ref Range: 6.4 - 8.2 g/dL 7.5    Albumin Latest Ref Range: 3.5 - 5.0 g/dL 3.5    Globulin Latest Ref Range: 2.0 - 4.0 g/dL 4.0    A-G Ratio Latest Ref Range: 1.1 - 2.2   0.9 (L)    ALT Latest Ref Range: 12 - 78 U/L 19    AST Latest Ref Range: 15 - 37 U/L 17    Alk. phosphatase Latest Ref Range: 45 - 117 U/L 92    Hemoglobin A1c, (calculated) Latest Ref Range: 4.0 - 5.6 % 6.3 (H)    Est. average glucose Latest Units: mg/dL 134    CULTURE, MRSA Unknown Rpt    TYPE & SCREEN Unknown Rpt    EKG, 12 LEAD, INITIAL Unknown  Rpt       Skin:   Denies open wounds, cuts, sores, rashes or other areas of concern in PAT assessment      Juventino Power NP     EKG from 6/2/20 reviewed with Dr. Carolynn Patten, anesthesiologist and compared with previous EKG from 10/26/18. Planned procedure, PMHx, functional status reviewed.  Pt ok to proceed with planned procedure per Dr. Carolynn Patten

## 2020-06-02 NOTE — PERIOP NOTES

## 2020-06-02 NOTE — PERIOP NOTES
Hibiclens/Chlorhexidine    Preventing Infections Before and After - Your Surgery    IMPORTANT INSTRUCTIONS    Please read and follow these instructions carefully. If you are unable to comply with the below instructions your procedure will be cancelled. Every Night for Three (3) nights before your surgery:  1. Shower with an antibacterial soap, such as Dial, or the soap provided at your preassessment appointment. A shower is better than a bath for cleaning your skin. 2. If needed, ask someone to help you reach all areas of your body. Dont forget to clean your belly button with every shower. The night before your surgery: If you lose your Hibiclens/chlorhexidine please contact surgery center or you can purchase it at a local pharmacy  1. On the night before your surgery, shower with an antibacterial soap, such as Dial, or the soap provided at your preassessment appointment. 2. With one packet of Hibiclens/Chlorhexidine in hand, turn water off.  3. Apply Hibiclens antiseptic skin cleanser with a clean, freshly washed washcloth. ? Gently apply to your body from chin to toes (except the genital area) and especially the area(s) where your incision(s) will be. ? Leave Hibiclens/Chlorhexidine on your skin for at least 20 seconds. CAUTION: If needed, Hibiclens/chlorhexidine may be used to clean the folds of skin of the legs (such as in the area of the groin) and on your buttocks and hips. However, do not use Hibiclens/Chlorhexidine above the neck or in the genital area (your bottom) or put inside any area of your body. 4. Turn the water back on and rinse. 5. Dry gently with a clean, freshly washed towel. 6. After your shower, do not use any powder, deodorant, perfumes or lotion. 7. Use clean, freshly washed towels and washcloths every time you shower. 8. Wear clean, freshly washed pajamas to bed the night before surgery. 9. Sleep on clean, freshly washed sheets.   10. Do not allow pets to sleep in your bed with you. The Morning of your surgery:  1. Shower again thoroughly with an antibacterial soap, such as Dial or the soap provided at your preassessment appointment. If needed, ask someone for help to reach all areas of your body. Dont forget to clean your belly button! Rinse. 2. Dry gently with a clean, freshly washed towel. 3. After your shower, do not use any powder, deodorant, perfumes or lotion prior to surgery. 4. Put on clean, freshly washed clothing. Tips to help prevent infections after your surgery:  1. Protect your surgical wound from germs:  ? Hand washing is the most important thing you and your caregivers can do to prevent infections. ? Keep your bandage clean and dry! ? Do not touch your surgical wound. 2. Use clean, freshly washed towels and washcloths every time you shower; do not share bath linens with others. 3. Until your surgical wound is healed, wear clothing and sleep on bed linens each day that are clean and freshly washed. 4. Do not allow pets to sleep in your bed with you or touch your surgical wound. 5. Do not smoke - smoking delays wound healing. This may be a good time to stop smoking. 6. If you have diabetes, it is important for you to manage your blood sugar levels properly before your surgery as well as after your surgery. Poorly managed blood sugar levels slow down wound healing and prevent you from healing completely. If you lose your Hibiclens/chlorhexidine, please call the [facility], or it is available for purchase at your pharmacy.                ___________________      ___________________      6/2/20 @ 4799  (Signature of Patient)          (Witness)                   (Date and Time)

## 2020-06-02 NOTE — PERIOP NOTES
Incentive Spirometer        Using the incentive spirometer helps expand the small air sacs of your lungs, helps you breathe deeply, and helps improve your lung function. Use your incentive spirometer twice a day (10 breaths each time) prior to surgery. How to Use Your Incentive Spirometer:  1. Hold the incentive spirometer in an upright position. 2. Breathe out as usual.   3. Place the mouthpiece in your mouth and seal your lips tightly around it. 4. Take a deep breath. Breathe in slowly and as deeply as possible. Keep the blue flow rate guide between the arrows. 5. Hold your breath as long as possible. Then exhale slowly and allow the piston to fall to the bottom of the column. 6. Rest for a few seconds and repeat steps one through five at least 10 times. PAT Tidal Volume____1250____  x___2___  Date_6/2/2020__    Jakie Kawasaki THE INCENTIVE SPIROMETER WITH YOU TO THE HOSPITAL ON THE DAY OF YOUR SURGERY. Opportunity given to ask and answer questions as well as to observe return demonstration.     Patient signature_____________________________              Witness____________________________

## 2020-06-02 NOTE — PERIOP NOTES
[facility]  Joint/Spine Preoperative Instructions    Surgery Date 6/11/2020          Time of Alex Abreu  Contact # [phone] cell    1. On the day of your surgery, please report to the Surgical Services Registration Desk and sign in at your designated time. The Surgery Center is located to the right of the Emergency Room. 2. You must have someone with you to drive you home. You should not drive a car for 24 hours following surgery. Please make arrangements for a friend or family member to stay with you for the first 24 hours after your surgery. 3. No food after midnight 6/10/20. Medications morning of surgery should be taken with a sip of water. Please follow pre-surgery drink instructions that were given at your Pre Admission Testing appointment. 4. We recommend you do not drink any alcoholic beverages for 24 hours before and after your surgery. 5. Contact your surgeons office for instructions on the following medications: non-steroidal anti-inflammatory drugs (i.e. Advil, Aleve), vitamins, and supplements. (Some surgeons will want you to stop these medications prior to surgery and others may allow you to take them)  **If you are currently taking Plavix, Coumadin, Aspirin and/or other blood-thinning agents, contact your surgeon for instructions. ** Your surgeon will partner with the physician prescribing these medications to determine if it is safe to stop or if you need to continue taking. Please do not stop taking these medications without instructions from your surgeon    6. Wear comfortable clothes. Wear glasses instead of contacts. Do not bring any money or jewelry. Please bring picture ID, insurance card, and any prearranged co-payment or hospital payment. Do not wear make-up, particularly mascara the morning of your surgery. Do not wear nail polish, particularly if you are having foot /hand surgery.   Wear your hair loose or down, no ponytails, buns, aracely pins or clips. All body piercings must be removed. Please shower with antibacterial soap for three consecutive days before and on the morning of surgery, but do not apply any lotions, powders or deodorants after the shower on the day of surgery. Please use a fresh towels after each shower. Please sleep in clean clothes and change bed linens the night before surgery. Please do not shave for 48 hours prior to surgery. Shaving of the face is acceptable. 7. You should understand that if you do not follow these instructions your surgery may be cancelled. If your physical condition changes (I.e. fever, cold or flu) please contact your surgeon as soon as possible. 8. It is important that you be on time. If a situation occurs where you may be late, please call [phone] (OR Holding Area). 9. If you have any questions and or problems, please call [phone] (Pre-admission Testing). 10. Your surgery time may be subject to change. You will receive a phone call the evening prior if your time changes. 11.  If having outpatient surgery, you must have someone to drive you here, stay with you during the duration of your stay, and to drive you home at time of discharge. 12.   In an effort to improve the efficiency, privacy, and safety for all of our Pre-op patients visitors are not allowed in the Holding area. Once you arrive and are registered your family/visitors will be asked to remain in your vehicle. The Pre-op staff will call you when they are ready for you to enter the building and will explain to you and your family/visitors that the Pre-op phase is beginning. At this time, if your procedure is outpatient, your family member will be asked to stay in their vehicle until the surgery is complete and you are ready for discharge. If you are going to be admitted after your surgery, once you are called to come inside the building, your family will be able to leave the parking lot.    At this time the staff will also ask for your designated spokesperson information in the event that the physician or staff need to provide an update or obtain any pertinent information. The designated spokesperson will be notified if the physician needs to speak to family during the pre-operative phase.and/or in the post op phase. Special Instructions:   TGOMD-67 testing scheduled for 6/7/2020 @ 0900. Patient must self-quarantine after Covid test obtained and up to date of surgery. Practice incentive spirometer per instructions and bring to hospital day of surgery. TAKE ALL MEDICATIONS THE DAY OF SURGERY EXCEPT:  None    I understand a pre-operative phone call will be made to verify my surgery time. In the event that I am not available, I give permission for a message to be left on my answering service and/or with another person?   yes         ___________________        __________   6/2/2020 @ 5686    (Signature of Patient)             (Witness)                (Date and Time)

## 2020-06-03 LAB
BACTERIA SPEC CULT: NORMAL
BACTERIA SPEC CULT: NORMAL
SERVICE CMNT-IMP: NORMAL

## 2020-06-07 ENCOUNTER — HOSPITAL ENCOUNTER (OUTPATIENT)
Dept: PREADMISSION TESTING | Age: 75
Discharge: HOME | End: 2020-06-07
Payer: MEDICARE

## 2020-06-07 PROCEDURE — 87635 SARS-COV-2 COVID-19 AMP PRB: CPT

## 2020-06-08 LAB
SARS-COV-2, COV2NT: NOT DETECTED
SPECIMEN SOURCE, FCOV2M: NORMAL

## 2020-06-10 ENCOUNTER — ANESTHESIA EVENT (OUTPATIENT)
Dept: SURGERY | Age: 75
DRG: 470 | End: 2020-06-10
Payer: MEDICARE

## 2020-06-11 ENCOUNTER — HOSPITAL ENCOUNTER (INPATIENT)
Age: 75
LOS: 1 days | Discharge: HOME HEALTH | DRG: 470 | End: 2020-06-12
Attending: ORTHOPAEDIC SURGERY | Admitting: ORTHOPAEDIC SURGERY
Payer: MEDICARE

## 2020-06-11 ENCOUNTER — ANESTHESIA (OUTPATIENT)
Dept: SURGERY | Age: 75
DRG: 470 | End: 2020-06-11
Payer: MEDICARE

## 2020-06-11 DIAGNOSIS — Z96.652 STATUS POST TOTAL LEFT KNEE REPLACEMENT: Primary | ICD-10-CM

## 2020-06-11 PROBLEM — Z96.659 STATUS POST TOTAL KNEE REPLACEMENT: Status: ACTIVE | Noted: 2020-06-11

## 2020-06-11 LAB
GLUCOSE BLD STRIP.AUTO-MCNC: 106 MG/DL (ref 65–100)
GLUCOSE BLD STRIP.AUTO-MCNC: 141 MG/DL (ref 65–100)
GLUCOSE BLD STRIP.AUTO-MCNC: 188 MG/DL (ref 65–100)
GLUCOSE BLD STRIP.AUTO-MCNC: 196 MG/DL (ref 65–100)
GLUCOSE BLD STRIP.AUTO-MCNC: 201 MG/DL (ref 65–100)
SERVICE CMNT-IMP: ABNORMAL

## 2020-06-11 PROCEDURE — 77030000032 HC CUF TRNQT ZIMM -B: Performed by: ORTHOPAEDIC SURGERY

## 2020-06-11 PROCEDURE — 74011000250 HC RX REV CODE- 250: Performed by: ORTHOPAEDIC SURGERY

## 2020-06-11 PROCEDURE — 76010000161 HC OR TIME 1 TO 1.5 HR INTENSV-TIER 1: Performed by: ORTHOPAEDIC SURGERY

## 2020-06-11 PROCEDURE — 77030031139 HC SUT VCRL2 J&J -A: Performed by: ORTHOPAEDIC SURGERY

## 2020-06-11 PROCEDURE — 77030033067 HC SUT PDO STRATFX SPIR J&J -B: Performed by: ORTHOPAEDIC SURGERY

## 2020-06-11 PROCEDURE — 82962 GLUCOSE BLOOD TEST: CPT

## 2020-06-11 PROCEDURE — 77030008684 HC TU ET CUF COVD -B: Performed by: NURSE ANESTHETIST, CERTIFIED REGISTERED

## 2020-06-11 PROCEDURE — 74011250637 HC RX REV CODE- 250/637: Performed by: ORTHOPAEDIC SURGERY

## 2020-06-11 PROCEDURE — 77030026438 HC STYL ET INTUB CARD -A: Performed by: NURSE ANESTHETIST, CERTIFIED REGISTERED

## 2020-06-11 PROCEDURE — 74011000250 HC RX REV CODE- 250: Performed by: NURSE ANESTHETIST, CERTIFIED REGISTERED

## 2020-06-11 PROCEDURE — 77030011640 HC PAD GRND REM COVD -A: Performed by: ORTHOPAEDIC SURGERY

## 2020-06-11 PROCEDURE — 77030036638 HC ACC KT GPS KNE V2 EXAC -D: Performed by: ORTHOPAEDIC SURGERY

## 2020-06-11 PROCEDURE — 0SRD0J9 REPLACEMENT OF LEFT KNEE JOINT WITH SYNTHETIC SUBSTITUTE, CEMENTED, OPEN APPROACH: ICD-10-PCS | Performed by: ORTHOPAEDIC SURGERY

## 2020-06-11 PROCEDURE — 74011250636 HC RX REV CODE- 250/636: Performed by: ORTHOPAEDIC SURGERY

## 2020-06-11 PROCEDURE — 76210000016 HC OR PH I REC 1 TO 1.5 HR: Performed by: ORTHOPAEDIC SURGERY

## 2020-06-11 PROCEDURE — 77030018846 HC SOL IRR STRL H20 ICUM -A: Performed by: ORTHOPAEDIC SURGERY

## 2020-06-11 PROCEDURE — 77030022704 HC SUT VLOC COVD -B: Performed by: ORTHOPAEDIC SURGERY

## 2020-06-11 PROCEDURE — 77030013079 HC BLNKT BAIR HGGR 3M -A: Performed by: NURSE ANESTHETIST, CERTIFIED REGISTERED

## 2020-06-11 PROCEDURE — 65270000029 HC RM PRIVATE

## 2020-06-11 PROCEDURE — 3E0T3BZ INTRODUCTION OF ANESTHETIC AGENT INTO PERIPHERAL NERVES AND PLEXI, PERCUTANEOUS APPROACH: ICD-10-PCS | Performed by: ANESTHESIOLOGY

## 2020-06-11 PROCEDURE — 77030028907 HC WRP KNEE WO BGS SOLM -B

## 2020-06-11 PROCEDURE — 77030033138 HC SUT PGA STRATFX J&J -B: Performed by: ORTHOPAEDIC SURGERY

## 2020-06-11 PROCEDURE — 74011250636 HC RX REV CODE- 250/636: Performed by: ANESTHESIOLOGY

## 2020-06-11 PROCEDURE — 77030019908 HC STETH ESOPH SIMS -A: Performed by: NURSE ANESTHETIST, CERTIFIED REGISTERED

## 2020-06-11 PROCEDURE — C1776 JOINT DEVICE (IMPLANTABLE): HCPCS | Performed by: ORTHOPAEDIC SURGERY

## 2020-06-11 PROCEDURE — 76060000033 HC ANESTHESIA 1 TO 1.5 HR: Performed by: ORTHOPAEDIC SURGERY

## 2020-06-11 PROCEDURE — 77030040361 HC SLV COMPR DVT MDII -B: Performed by: ORTHOPAEDIC SURGERY

## 2020-06-11 PROCEDURE — 74011250636 HC RX REV CODE- 250/636: Performed by: NURSE ANESTHETIST, CERTIFIED REGISTERED

## 2020-06-11 PROCEDURE — 74011250637 HC RX REV CODE- 250/637: Performed by: PHYSICIAN ASSISTANT

## 2020-06-11 PROCEDURE — 77030040922 HC BLNKT HYPOTHRM STRY -A

## 2020-06-11 PROCEDURE — 77030006835 HC BLD SAW SAG STRY -B: Performed by: ORTHOPAEDIC SURGERY

## 2020-06-11 PROCEDURE — 74011250636 HC RX REV CODE- 250/636: Performed by: PHYSICIAN ASSISTANT

## 2020-06-11 PROCEDURE — 97161 PT EVAL LOW COMPLEX 20 MIN: CPT

## 2020-06-11 PROCEDURE — 74011000258 HC RX REV CODE- 258: Performed by: PHYSICIAN ASSISTANT

## 2020-06-11 PROCEDURE — 77030019707 HC TBNG LAVG CRBJT KINM -C: Performed by: ORTHOPAEDIC SURGERY

## 2020-06-11 PROCEDURE — 77030013708 HC HNDPC SUC IRR PULS STRY –B: Performed by: ORTHOPAEDIC SURGERY

## 2020-06-11 PROCEDURE — 77030034479 HC ADH SKN CLSR PRINEO J&J -B: Performed by: ORTHOPAEDIC SURGERY

## 2020-06-11 PROCEDURE — 77030018836 HC SOL IRR NACL ICUM -A: Performed by: ORTHOPAEDIC SURGERY

## 2020-06-11 PROCEDURE — 77030036722: Performed by: ORTHOPAEDIC SURGERY

## 2020-06-11 PROCEDURE — 74011000250 HC RX REV CODE- 250: Performed by: PHYSICIAN ASSISTANT

## 2020-06-11 PROCEDURE — 97116 GAIT TRAINING THERAPY: CPT

## 2020-06-11 PROCEDURE — C1713 ANCHOR/SCREW BN/BN,TIS/BN: HCPCS | Performed by: ORTHOPAEDIC SURGERY

## 2020-06-11 PROCEDURE — 77030010785: Performed by: ORTHOPAEDIC SURGERY

## 2020-06-11 PROCEDURE — 77030018673: Performed by: ORTHOPAEDIC SURGERY

## 2020-06-11 DEVICE — IMPLANTABLE DEVICE: Type: IMPLANTABLE DEVICE | Site: KNEE | Status: FUNCTIONAL

## 2020-06-11 DEVICE — COMPONENT TOT KNEE CAPPED K1 STD HEMI CEM: Type: IMPLANTABLE DEVICE | Status: FUNCTIONAL

## 2020-06-11 DEVICE — IMPLANTABLE DEVICE
Type: IMPLANTABLE DEVICE | Site: KNEE | Status: FUNCTIONAL
Brand: TRULIANT

## 2020-06-11 DEVICE — IMPLANTABLE DEVICE
Type: IMPLANTABLE DEVICE | Site: KNEE | Status: FUNCTIONAL
Brand: OPTETRAK LOGIC

## 2020-06-11 DEVICE — COBALT HV BONE CEMENT 40GM
Type: IMPLANTABLE DEVICE | Site: KNEE | Status: FUNCTIONAL
Brand: DJO SURGICAL

## 2020-06-11 RX ORDER — HYDROMORPHONE HYDROCHLORIDE 2 MG/1
2 TABLET ORAL
Status: DISCONTINUED | OUTPATIENT
Start: 2020-06-11 | End: 2020-06-11

## 2020-06-11 RX ORDER — SODIUM CHLORIDE 9 MG/ML
125 INJECTION, SOLUTION INTRAVENOUS CONTINUOUS
Status: DISPENSED | OUTPATIENT
Start: 2020-06-11 | End: 2020-06-12

## 2020-06-11 RX ORDER — DIPHENHYDRAMINE HCL 12.5MG/5ML
12.5 LIQUID (ML) ORAL
Status: DISCONTINUED | OUTPATIENT
Start: 2020-06-11 | End: 2020-06-12 | Stop reason: HOSPADM

## 2020-06-11 RX ORDER — CELECOXIB 200 MG/1
200 CAPSULE ORAL 2 TIMES DAILY
Status: DISCONTINUED | OUTPATIENT
Start: 2020-06-11 | End: 2020-06-12 | Stop reason: HOSPADM

## 2020-06-11 RX ORDER — POTASSIUM CHLORIDE 750 MG/1
20 TABLET, FILM COATED, EXTENDED RELEASE ORAL 2 TIMES DAILY
Status: DISCONTINUED | OUTPATIENT
Start: 2020-06-11 | End: 2020-06-12 | Stop reason: HOSPADM

## 2020-06-11 RX ORDER — ROPIVACAINE HYDROCHLORIDE 5 MG/ML
INJECTION, SOLUTION EPIDURAL; INFILTRATION; PERINEURAL
Status: COMPLETED | OUTPATIENT
Start: 2020-06-11 | End: 2020-06-11

## 2020-06-11 RX ORDER — FAMOTIDINE 20 MG/1
20 TABLET, FILM COATED ORAL 2 TIMES DAILY
Status: DISCONTINUED | OUTPATIENT
Start: 2020-06-11 | End: 2020-06-12 | Stop reason: HOSPADM

## 2020-06-11 RX ORDER — SODIUM CHLORIDE, SODIUM LACTATE, POTASSIUM CHLORIDE, CALCIUM CHLORIDE 600; 310; 30; 20 MG/100ML; MG/100ML; MG/100ML; MG/100ML
50 INJECTION, SOLUTION INTRAVENOUS CONTINUOUS
Status: DISCONTINUED | OUTPATIENT
Start: 2020-06-11 | End: 2020-06-11 | Stop reason: HOSPADM

## 2020-06-11 RX ORDER — ROCURONIUM BROMIDE 10 MG/ML
INJECTION, SOLUTION INTRAVENOUS AS NEEDED
Status: DISCONTINUED | OUTPATIENT
Start: 2020-06-11 | End: 2020-06-11 | Stop reason: HOSPADM

## 2020-06-11 RX ORDER — ACETAMINOPHEN 325 MG/1
650 TABLET ORAL EVERY 6 HOURS
Status: DISCONTINUED | OUTPATIENT
Start: 2020-06-11 | End: 2020-06-12 | Stop reason: HOSPADM

## 2020-06-11 RX ORDER — NEOSTIGMINE METHYLSULFATE 1 MG/ML
INJECTION, SOLUTION INTRAVENOUS AS NEEDED
Status: DISCONTINUED | OUTPATIENT
Start: 2020-06-11 | End: 2020-06-11 | Stop reason: HOSPADM

## 2020-06-11 RX ORDER — ONDANSETRON 4 MG/1
4 TABLET, ORALLY DISINTEGRATING ORAL
Status: DISCONTINUED | OUTPATIENT
Start: 2020-06-13 | End: 2020-06-12 | Stop reason: HOSPADM

## 2020-06-11 RX ORDER — FENTANYL CITRATE 50 UG/ML
25 INJECTION, SOLUTION INTRAMUSCULAR; INTRAVENOUS
Status: DISCONTINUED | OUTPATIENT
Start: 2020-06-11 | End: 2020-06-11 | Stop reason: HOSPADM

## 2020-06-11 RX ORDER — ASPIRIN 81 MG/1
81 TABLET ORAL 2 TIMES DAILY
Qty: 60 TAB | Refills: 0 | Status: SHIPPED
Start: 2020-06-11 | End: 2020-07-11

## 2020-06-11 RX ORDER — HYDROMORPHONE HYDROCHLORIDE 2 MG/ML
INJECTION, SOLUTION INTRAMUSCULAR; INTRAVENOUS; SUBCUTANEOUS AS NEEDED
Status: DISCONTINUED | OUTPATIENT
Start: 2020-06-11 | End: 2020-06-11 | Stop reason: HOSPADM

## 2020-06-11 RX ORDER — TRANEXAMIC ACID 100 MG/ML
INJECTION, SOLUTION INTRAVENOUS AS NEEDED
Status: DISCONTINUED | OUTPATIENT
Start: 2020-06-11 | End: 2020-06-11 | Stop reason: HOSPADM

## 2020-06-11 RX ORDER — FACIAL-BODY WIPES
10 EACH TOPICAL DAILY PRN
Status: DISCONTINUED | OUTPATIENT
Start: 2020-06-13 | End: 2020-06-12 | Stop reason: HOSPADM

## 2020-06-11 RX ORDER — PROPOFOL 10 MG/ML
INJECTION, EMULSION INTRAVENOUS
Status: DISCONTINUED | OUTPATIENT
Start: 2020-06-11 | End: 2020-06-11 | Stop reason: HOSPADM

## 2020-06-11 RX ORDER — NALOXONE HYDROCHLORIDE 0.4 MG/ML
0.4 INJECTION, SOLUTION INTRAMUSCULAR; INTRAVENOUS; SUBCUTANEOUS AS NEEDED
Status: DISCONTINUED | OUTPATIENT
Start: 2020-06-11 | End: 2020-06-12 | Stop reason: HOSPADM

## 2020-06-11 RX ORDER — SODIUM CHLORIDE, SODIUM LACTATE, POTASSIUM CHLORIDE, CALCIUM CHLORIDE 600; 310; 30; 20 MG/100ML; MG/100ML; MG/100ML; MG/100ML
25 INJECTION, SOLUTION INTRAVENOUS CONTINUOUS
Status: DISCONTINUED | OUTPATIENT
Start: 2020-06-11 | End: 2020-06-11 | Stop reason: HOSPADM

## 2020-06-11 RX ORDER — ACETAMINOPHEN 325 MG/1
650 TABLET ORAL ONCE
Status: DISCONTINUED | OUTPATIENT
Start: 2020-06-11 | End: 2020-06-11 | Stop reason: HOSPADM

## 2020-06-11 RX ORDER — AMOXICILLIN 250 MG
1 CAPSULE ORAL 2 TIMES DAILY
Qty: 28 TAB | Refills: 0 | Status: SHIPPED
Start: 2020-06-11 | End: 2020-06-25

## 2020-06-11 RX ORDER — DEXAMETHASONE SODIUM PHOSPHATE 4 MG/ML
10 INJECTION, SOLUTION INTRA-ARTICULAR; INTRALESIONAL; INTRAMUSCULAR; INTRAVENOUS; SOFT TISSUE ONCE
Status: COMPLETED | OUTPATIENT
Start: 2020-06-12 | End: 2020-06-12

## 2020-06-11 RX ORDER — SODIUM CHLORIDE 0.9 % (FLUSH) 0.9 %
5-40 SYRINGE (ML) INJECTION EVERY 8 HOURS
Status: DISCONTINUED | OUTPATIENT
Start: 2020-06-11 | End: 2020-06-11 | Stop reason: HOSPADM

## 2020-06-11 RX ORDER — CELECOXIB 200 MG/1
400 CAPSULE ORAL ONCE
Status: COMPLETED | OUTPATIENT
Start: 2020-06-11 | End: 2020-06-11

## 2020-06-11 RX ORDER — HYDROMORPHONE HYDROCHLORIDE 1 MG/ML
0.2 INJECTION, SOLUTION INTRAMUSCULAR; INTRAVENOUS; SUBCUTANEOUS
Status: DISCONTINUED | OUTPATIENT
Start: 2020-06-11 | End: 2020-06-11 | Stop reason: HOSPADM

## 2020-06-11 RX ORDER — GLYCOPYRROLATE 0.2 MG/ML
INJECTION INTRAMUSCULAR; INTRAVENOUS AS NEEDED
Status: DISCONTINUED | OUTPATIENT
Start: 2020-06-11 | End: 2020-06-11 | Stop reason: HOSPADM

## 2020-06-11 RX ORDER — MIDAZOLAM HYDROCHLORIDE 1 MG/ML
INJECTION, SOLUTION INTRAMUSCULAR; INTRAVENOUS AS NEEDED
Status: DISCONTINUED | OUTPATIENT
Start: 2020-06-11 | End: 2020-06-11 | Stop reason: HOSPADM

## 2020-06-11 RX ORDER — TRAMADOL HYDROCHLORIDE 50 MG/1
50 TABLET ORAL
Status: DISCONTINUED | OUTPATIENT
Start: 2020-06-11 | End: 2020-06-12 | Stop reason: HOSPADM

## 2020-06-11 RX ORDER — BUMETANIDE 1 MG/1
0.5 TABLET ORAL DAILY
Status: DISCONTINUED | OUTPATIENT
Start: 2020-06-11 | End: 2020-06-12 | Stop reason: HOSPADM

## 2020-06-11 RX ORDER — FENTANYL CITRATE 50 UG/ML
INJECTION, SOLUTION INTRAMUSCULAR; INTRAVENOUS AS NEEDED
Status: DISCONTINUED | OUTPATIENT
Start: 2020-06-11 | End: 2020-06-11 | Stop reason: HOSPADM

## 2020-06-11 RX ORDER — SODIUM CHLORIDE 0.9 % (FLUSH) 0.9 %
5-40 SYRINGE (ML) INJECTION AS NEEDED
Status: DISCONTINUED | OUTPATIENT
Start: 2020-06-11 | End: 2020-06-11 | Stop reason: HOSPADM

## 2020-06-11 RX ORDER — ROPIVACAINE HYDROCHLORIDE 5 MG/ML
INJECTION, SOLUTION EPIDURAL; INFILTRATION; PERINEURAL AS NEEDED
Status: DISCONTINUED | OUTPATIENT
Start: 2020-06-11 | End: 2020-06-11 | Stop reason: HOSPADM

## 2020-06-11 RX ORDER — SODIUM CHLORIDE 9 MG/ML
INJECTION, SOLUTION INTRAVENOUS
Status: DISPENSED
Start: 2020-06-11 | End: 2020-06-11

## 2020-06-11 RX ORDER — HYDROMORPHONE HYDROCHLORIDE 1 MG/ML
0.5 INJECTION, SOLUTION INTRAMUSCULAR; INTRAVENOUS; SUBCUTANEOUS
Status: ACTIVE | OUTPATIENT
Start: 2020-06-11 | End: 2020-06-12

## 2020-06-11 RX ORDER — FENTANYL CITRATE 50 UG/ML
50 INJECTION, SOLUTION INTRAMUSCULAR; INTRAVENOUS AS NEEDED
Status: DISCONTINUED | OUTPATIENT
Start: 2020-06-11 | End: 2020-06-11 | Stop reason: HOSPADM

## 2020-06-11 RX ORDER — PREGABALIN 75 MG/1
75 CAPSULE ORAL ONCE
Status: COMPLETED | OUTPATIENT
Start: 2020-06-11 | End: 2020-06-11

## 2020-06-11 RX ORDER — SODIUM CHLORIDE 0.9 % (FLUSH) 0.9 %
5-40 SYRINGE (ML) INJECTION AS NEEDED
Status: DISCONTINUED | OUTPATIENT
Start: 2020-06-11 | End: 2020-06-12 | Stop reason: HOSPADM

## 2020-06-11 RX ORDER — POLYETHYLENE GLYCOL 3350 17 G/17G
17 POWDER, FOR SOLUTION ORAL DAILY
Status: DISCONTINUED | OUTPATIENT
Start: 2020-06-11 | End: 2020-06-12 | Stop reason: HOSPADM

## 2020-06-11 RX ORDER — ACETAMINOPHEN 500 MG
1000 TABLET ORAL ONCE
Status: COMPLETED | OUTPATIENT
Start: 2020-06-11 | End: 2020-06-11

## 2020-06-11 RX ORDER — LIDOCAINE HYDROCHLORIDE 20 MG/ML
INJECTION, SOLUTION EPIDURAL; INFILTRATION; INTRACAUDAL; PERINEURAL AS NEEDED
Status: DISCONTINUED | OUTPATIENT
Start: 2020-06-11 | End: 2020-06-11 | Stop reason: HOSPADM

## 2020-06-11 RX ORDER — ASPIRIN 81 MG/1
81 TABLET ORAL 2 TIMES DAILY
Status: DISCONTINUED | OUTPATIENT
Start: 2020-06-11 | End: 2020-06-12 | Stop reason: HOSPADM

## 2020-06-11 RX ORDER — LIDOCAINE HYDROCHLORIDE 10 MG/ML
0.1 INJECTION, SOLUTION EPIDURAL; INFILTRATION; INTRACAUDAL; PERINEURAL AS NEEDED
Status: DISCONTINUED | OUTPATIENT
Start: 2020-06-11 | End: 2020-06-11 | Stop reason: HOSPADM

## 2020-06-11 RX ORDER — ONDANSETRON 2 MG/ML
4 INJECTION INTRAMUSCULAR; INTRAVENOUS
Status: DISPENSED | OUTPATIENT
Start: 2020-06-11 | End: 2020-06-12

## 2020-06-11 RX ORDER — PROPOFOL 10 MG/ML
INJECTION, EMULSION INTRAVENOUS AS NEEDED
Status: DISCONTINUED | OUTPATIENT
Start: 2020-06-11 | End: 2020-06-11 | Stop reason: HOSPADM

## 2020-06-11 RX ORDER — ONDANSETRON 2 MG/ML
4 INJECTION INTRAMUSCULAR; INTRAVENOUS AS NEEDED
Status: DISCONTINUED | OUTPATIENT
Start: 2020-06-11 | End: 2020-06-11 | Stop reason: HOSPADM

## 2020-06-11 RX ORDER — SODIUM CHLORIDE 0.9 % (FLUSH) 0.9 %
5-40 SYRINGE (ML) INJECTION EVERY 8 HOURS
Status: DISCONTINUED | OUTPATIENT
Start: 2020-06-11 | End: 2020-06-12 | Stop reason: HOSPADM

## 2020-06-11 RX ORDER — AMOXICILLIN 250 MG
1 CAPSULE ORAL 2 TIMES DAILY
Status: DISCONTINUED | OUTPATIENT
Start: 2020-06-11 | End: 2020-06-12 | Stop reason: HOSPADM

## 2020-06-11 RX ORDER — SUCCINYLCHOLINE CHLORIDE 20 MG/ML
INJECTION INTRAMUSCULAR; INTRAVENOUS AS NEEDED
Status: DISCONTINUED | OUTPATIENT
Start: 2020-06-11 | End: 2020-06-11 | Stop reason: HOSPADM

## 2020-06-11 RX ORDER — ACETAMINOPHEN 325 MG/1
650 TABLET ORAL EVERY 6 HOURS
Qty: 112 TAB | Refills: 0 | Status: SHIPPED
Start: 2020-06-11 | End: 2020-06-25

## 2020-06-11 RX ORDER — DEXAMETHASONE SODIUM PHOSPHATE 4 MG/ML
INJECTION, SOLUTION INTRA-ARTICULAR; INTRALESIONAL; INTRAMUSCULAR; INTRAVENOUS; SOFT TISSUE AS NEEDED
Status: DISCONTINUED | OUTPATIENT
Start: 2020-06-11 | End: 2020-06-11 | Stop reason: HOSPADM

## 2020-06-11 RX ORDER — MIDAZOLAM HYDROCHLORIDE 1 MG/ML
1 INJECTION, SOLUTION INTRAMUSCULAR; INTRAVENOUS AS NEEDED
Status: DISCONTINUED | OUTPATIENT
Start: 2020-06-11 | End: 2020-06-11 | Stop reason: HOSPADM

## 2020-06-11 RX ORDER — DEXAMETHASONE SODIUM PHOSPHATE 100 MG/10ML
10 INJECTION INTRAMUSCULAR; INTRAVENOUS ONCE
Status: DISCONTINUED | OUTPATIENT
Start: 2020-06-12 | End: 2020-06-11 | Stop reason: SDUPTHER

## 2020-06-11 RX ORDER — EPHEDRINE SULFATE/0.9% NACL/PF 50 MG/5 ML
SYRINGE (ML) INTRAVENOUS AS NEEDED
Status: DISCONTINUED | OUTPATIENT
Start: 2020-06-11 | End: 2020-06-11 | Stop reason: HOSPADM

## 2020-06-11 RX ORDER — TRANEXAMIC ACID 100 MG/ML
INJECTION, SOLUTION INTRAVENOUS
Status: DISPENSED
Start: 2020-06-11 | End: 2020-06-11

## 2020-06-11 RX ORDER — PHENYLEPHRINE HCL IN 0.9% NACL 0.4MG/10ML
SYRINGE (ML) INTRAVENOUS AS NEEDED
Status: DISCONTINUED | OUTPATIENT
Start: 2020-06-11 | End: 2020-06-11 | Stop reason: HOSPADM

## 2020-06-11 RX ORDER — HYDROMORPHONE HYDROCHLORIDE 2 MG/1
2 TABLET ORAL
Qty: 60 TAB | Refills: 0 | Status: SHIPPED | OUTPATIENT
Start: 2020-06-11 | End: 2020-06-12

## 2020-06-11 RX ORDER — KETAMINE HYDROCHLORIDE 10 MG/ML
INJECTION, SOLUTION INTRAMUSCULAR; INTRAVENOUS AS NEEDED
Status: DISCONTINUED | OUTPATIENT
Start: 2020-06-11 | End: 2020-06-11 | Stop reason: HOSPADM

## 2020-06-11 RX ORDER — ONDANSETRON 2 MG/ML
INJECTION INTRAMUSCULAR; INTRAVENOUS AS NEEDED
Status: DISCONTINUED | OUTPATIENT
Start: 2020-06-11 | End: 2020-06-11 | Stop reason: HOSPADM

## 2020-06-11 RX ADMIN — MIDAZOLAM HYDROCHLORIDE 2 MG: 1 INJECTION, SOLUTION INTRAMUSCULAR; INTRAVENOUS at 07:15

## 2020-06-11 RX ADMIN — ACETAMINOPHEN 650 MG: 325 TABLET, FILM COATED ORAL at 19:00

## 2020-06-11 RX ADMIN — CELECOXIB 200 MG: 200 CAPSULE ORAL at 19:00

## 2020-06-11 RX ADMIN — Medication 2 MG: at 08:46

## 2020-06-11 RX ADMIN — CELECOXIB 400 MG: 200 CAPSULE ORAL at 06:31

## 2020-06-11 RX ADMIN — SODIUM CHLORIDE 125 ML/HR: 900 INJECTION, SOLUTION INTRAVENOUS at 09:35

## 2020-06-11 RX ADMIN — ACETAMINOPHEN 650 MG: 325 TABLET, FILM COATED ORAL at 12:53

## 2020-06-11 RX ADMIN — TRAMADOL HYDROCHLORIDE 50 MG: 50 TABLET, FILM COATED ORAL at 21:31

## 2020-06-11 RX ADMIN — SENNOSIDES AND DOCUSATE SODIUM 1 TABLET: 8.6; 5 TABLET ORAL at 19:00

## 2020-06-11 RX ADMIN — Medication 80 MCG: at 07:46

## 2020-06-11 RX ADMIN — HYDROMORPHONE HYDROCHLORIDE 0.5 MG: 2 INJECTION, SOLUTION INTRAMUSCULAR; INTRAVENOUS; SUBCUTANEOUS at 08:01

## 2020-06-11 RX ADMIN — ACETAMINOPHEN 1000 MG: 500 TABLET ORAL at 06:31

## 2020-06-11 RX ADMIN — PREGABALIN 75 MG: 75 CAPSULE ORAL at 06:31

## 2020-06-11 RX ADMIN — LIDOCAINE HYDROCHLORIDE 40 MG: 20 INJECTION, SOLUTION EPIDURAL; INFILTRATION; INTRACAUDAL; PERINEURAL at 07:42

## 2020-06-11 RX ADMIN — PROPOFOL 50 MCG/KG/MIN: 10 INJECTION, EMULSION INTRAVENOUS at 07:42

## 2020-06-11 RX ADMIN — ASPIRIN 81 MG: 81 TABLET, COATED ORAL at 19:00

## 2020-06-11 RX ADMIN — FAMOTIDINE 20 MG: 20 TABLET, FILM COATED ORAL at 19:00

## 2020-06-11 RX ADMIN — BUMETANIDE 0.5 MG: 1 TABLET ORAL at 15:28

## 2020-06-11 RX ADMIN — ONDANSETRON 4 MG: 2 INJECTION INTRAMUSCULAR; INTRAVENOUS at 18:01

## 2020-06-11 RX ADMIN — Medication 10 ML: at 15:29

## 2020-06-11 RX ADMIN — ROCURONIUM BROMIDE 10 MG: 10 INJECTION INTRAVENOUS at 07:42

## 2020-06-11 RX ADMIN — CEFAZOLIN 2 G: 1 INJECTION, POWDER, FOR SOLUTION INTRAMUSCULAR; INTRAVENOUS at 17:50

## 2020-06-11 RX ADMIN — FENTANYL CITRATE 50 MCG: 50 INJECTION, SOLUTION INTRAMUSCULAR; INTRAVENOUS at 07:42

## 2020-06-11 RX ADMIN — SUCCINYLCHOLINE CHLORIDE 120 MG: 20 INJECTION, SOLUTION INTRAMUSCULAR; INTRAVENOUS at 07:42

## 2020-06-11 RX ADMIN — POLYETHYLENE GLYCOL 3350 17 G: 17 POWDER, FOR SOLUTION ORAL at 12:53

## 2020-06-11 RX ADMIN — Medication 3 AMPULE: at 07:24

## 2020-06-11 RX ADMIN — ONDANSETRON HYDROCHLORIDE 4 MG: 2 INJECTION, SOLUTION INTRAMUSCULAR; INTRAVENOUS at 07:47

## 2020-06-11 RX ADMIN — HYDROMORPHONE HYDROCHLORIDE 2 MG: 2 TABLET ORAL at 15:27

## 2020-06-11 RX ADMIN — TRANEXAMIC ACID 1 G: 100 INJECTION, SOLUTION INTRAVENOUS at 07:35

## 2020-06-11 RX ADMIN — Medication 1 AMPULE: at 15:28

## 2020-06-11 RX ADMIN — DEXAMETHASONE SODIUM PHOSPHATE 6 MG: 4 INJECTION, SOLUTION INTRAMUSCULAR; INTRAVENOUS at 07:47

## 2020-06-11 RX ADMIN — ROPIVACAINE HYDROCHLORIDE 20 ML: 5 INJECTION, SOLUTION EPIDURAL; INFILTRATION; PERINEURAL at 07:15

## 2020-06-11 RX ADMIN — CEFAZOLIN 2 G: 1 INJECTION, POWDER, FOR SOLUTION INTRAMUSCULAR; INTRAVENOUS at 23:23

## 2020-06-11 RX ADMIN — PHENYLEPHRINE HYDROCHLORIDE 40 MCG/MIN: 10 INJECTION INTRAVENOUS at 07:48

## 2020-06-11 RX ADMIN — WATER 2 G: 1 INJECTION INTRAMUSCULAR; INTRAVENOUS; SUBCUTANEOUS at 07:48

## 2020-06-11 RX ADMIN — PROPOFOL 100 MG: 10 INJECTION, EMULSION INTRAVENOUS at 07:42

## 2020-06-11 RX ADMIN — Medication 1 AMPULE: at 21:31

## 2020-06-11 RX ADMIN — Medication 10 MG: at 08:51

## 2020-06-11 RX ADMIN — HYDROMORPHONE HYDROCHLORIDE 0.5 MG: 2 INJECTION, SOLUTION INTRAMUSCULAR; INTRAVENOUS; SUBCUTANEOUS at 07:59

## 2020-06-11 RX ADMIN — SODIUM CHLORIDE 125 ML/HR: 900 INJECTION, SOLUTION INTRAVENOUS at 17:56

## 2020-06-11 RX ADMIN — CELECOXIB 200 MG: 200 CAPSULE ORAL at 15:28

## 2020-06-11 RX ADMIN — POTASSIUM CHLORIDE 20 MEQ: 750 TABLET, FILM COATED, EXTENDED RELEASE ORAL at 19:00

## 2020-06-11 RX ADMIN — Medication 10 MG: at 07:45

## 2020-06-11 RX ADMIN — GLYCOPYRROLATE 0.2 MG: 0.2 INJECTION, SOLUTION INTRAMUSCULAR; INTRAVENOUS at 08:13

## 2020-06-11 RX ADMIN — KETAMINE HYDROCHLORIDE 30 MG: 10 INJECTION, SOLUTION INTRAMUSCULAR; INTRAVENOUS at 07:49

## 2020-06-11 RX ADMIN — ONDANSETRON 4 MG: 2 INJECTION INTRAMUSCULAR; INTRAVENOUS at 11:30

## 2020-06-11 RX ADMIN — TRANEXAMIC ACID 1000 MG: 1 INJECTION, SOLUTION INTRAVENOUS at 09:53

## 2020-06-11 RX ADMIN — GLYCOPYRROLATE 0.3 MG: 0.2 INJECTION, SOLUTION INTRAMUSCULAR; INTRAVENOUS at 08:46

## 2020-06-11 RX ADMIN — Medication 10 ML: at 21:32

## 2020-06-11 RX ADMIN — ACETAMINOPHEN 650 MG: 325 TABLET, FILM COATED ORAL at 23:22

## 2020-06-11 RX ADMIN — SODIUM CHLORIDE, SODIUM LACTATE, POTASSIUM CHLORIDE, AND CALCIUM CHLORIDE 25 ML/HR: 600; 310; 30; 20 INJECTION, SOLUTION INTRAVENOUS at 07:24

## 2020-06-11 RX ADMIN — FENTANYL CITRATE 50 MCG: 50 INJECTION, SOLUTION INTRAMUSCULAR; INTRAVENOUS at 07:15

## 2020-06-11 NOTE — PERIOP NOTES
Patient Covid 19 test done 6/7/2020 and was negative. Patient reports self quarantine, and reports no signs or symptoms.

## 2020-06-11 NOTE — DISCHARGE INSTRUCTIONS
Discharge Instructions: How to [address] [place] [address]  Surgery: Total Knee Replacement  Surgeon:   Johnny Coughlin MD  Surgery Date:  6/11/2020     To relieve pain:   Use ice/gel packs.  Put the ice pack directly over the wound, or anywhere you are hurting or swollen.  To control pain and swelling, keep ice on regularly, especially after physical activity.  The packs should stay cold for 3-4 hours. When it is not cold anymore, rotate with the packs in the freezer.  Elevate your leg. This will also keep swelling down.  Rest for at least 20 minutes between activity or exercises.  To keep track of your pain medications, write down what you take and when you take it.  The last dose of pain medication you got in the hospital was:       Medication    Dose    Date & Time           Choose your medications based on the pain scale below:     To keep your pain under control, take Tylenol every 6 hours for 14 days - even if you feel like you dont need it.  For mild to moderate pain (4-6 on pain scale), take one pain pill every 3-4 hours as needed.  For severe pain (7-10 on pain scale), take two pain pills every 3-4 hours as needed.  To prevent nausea, take your pain medications with food. Pain Scale                 As your pain lessens:     Slowly start taking less pain medication. You may do this by waiting longer between doses or by taking smaller doses.  Stop using the pain medications as soon as you no longer need it, usually in 2-3 weeks.  Aspirin   To prevent blood clots, you will need to take Aspirin 81 mg twice a day for 30 days.                To prevent stomach upset or bleeding:   Do not take non-steroidal anti-inflammatory medications (Ibuprofen, Advil, Motrin, Naproxen, etc.)    Take Pepcid 20 mg twice a day, or a similar home medication, while you are taking a blood thinner. OPSITE (Honeycomb dressing)     Keep your clear, waterproof dressing in place for 5-7 days after your leave the hospital.     If you are still having drainage, you will need to change your dressing in 5-7 days. You will be given one extra dressing to use at home.  If there is no more drainage from the wound, you may leave it open to the air.  You will have some swelling, warmth, and bruising around the knee and up and down the leg after surgery. This will may get worse in the first few days you are home and will slowly get better over the next few weeks. OPSITE DRESSING INSTRUCTIONS                  PRINEO DRESSING INSTRUCTIONS     Prineo is a type of skin glue and mesh that is keeping your wound together.  Leave this covering alone. Do not peel it off.  Do not apply oils or lotions over it.  You may shower with this dressing but do not soak it. Gently pat your wound dry when you get out of the shower.  DO NOTs:   Do not rub your surgical wound   Do not put lotion or oils on your wound.  Do not take a tub bath or go swimming until your doctor says it is ok.  You may shower with this dressing over your wound.  After showering pat the dressing dry.  If you have staples a home health nurse will remove them in about 10 days.  To increase and promote healing:   Stop Smoking (or at least cut back on       Smoking).  Eat a well-balanced diet (high in protein       and vitamin C).  If you have a poor appetite, drink Ensure, Glucerna, or         [place] Instant Breakfast for the next 30 days.  If you are diabetic, you should control your blood         sugars to prevent infection and help your wound         to heal.                     f you have a poor appetite, drink Ensure, Glucerna, or [place] Instant Breakfast for the next 30 days.      If you are diabetic, you should control your blood                                                sugars to prevent infection and help your wound                                                to heal.     Prevent Infection    1. Wash your hands.  This is the most important thing you or your caregiver can do.  Wash your hands with soap and water (or use the hand  we gave you) before you touch any wounds. 2. Shower.  Use the antibacterial soap we gave you when you take a shower.  Shower with this soap until your wounds are healed. 3.  Use clean sheets.  Put freshly cleaned sheets on your bed after surgery.  To keep the surgery site clean, do not allow pets to sleep with you while your wound is still healing.  To prevent constipation, stay active and drink plenty of fluid.  While using pain medications, you should also take stool softeners and laxatives, such as Pericolace       and Miralax.  If you are having too many bowel       Movements, then you may need       to stop taking the laxatives.  You should have a bowel movement 3-4 days        after surgery and then at least every other day while       on pain medication.  To improve your recovery, you must be active!  Use your walker and take short walks (in your home)         about every 2 hours during the day.  Try to increase how far you walk each day.  You can put as much weight on your leg as you can tolerate while walking.  To avoid getting a stiff knee, work on getting your knee bent and straight as soon as possible.  Home health physical therapy will come to your       home the day after you leave the hospital.  The       therapist will visit about 4 times over the next        couple of weeks to teach you how to get out of        bed, to safely walk in your home, and to do your        exercises.  NO DRIVING until your surgeon tells you it is ok.      You can return to work when cleared by a physician.  Please call your physician immediately if you have:     Constant bleeding from your wound.  Increasing redness or swelling around your wound (Some warmth, bruising and swelling is normal).  Change in wound drainage (increase in amount, color, or bad smell).  Change in mental status (unusual behavior   Temperature over 101.5 degrees Fahrenheit      Pain or redness in the calf (back of your lower leg - see picture)     Increased swelling of the thigh, ankle, calf, or foot.  Emergency: CALL 911 if you have:     Shortness of breath     Chest pain when you cough or taking a deep breath     Please call your surgeons office at [phone] for a follow up appointment. _   You should call as soon as you get home or the next day after discharge. Ask to make an appointment in 2 weeks.  If you have questions or concerns during normal business hours, you may reach Dr. Jaiden Santiago' Team at [phone].

## 2020-06-11 NOTE — PROGRESS NOTES
Ortho / Neurosurgery NP Note    POD# 0  s/p LEFT TOTAL KNEE ARTHROPLASTY WITH NAVIGATION     Pt resting in bed. Dizzy and nausea with PT. BP remained stable, not orthostatic during session. Nausea resolved with Zofran. Pain well controlled with prn Dilaudid. Tolerating clears and crackers. VSS Afebrile. Room air. Visit Vitals  /58   Pulse 76   Temp 97.2 °F (36.2 °C)   Resp 16   Ht 4' 11.75\" (1.518 m)   Wt 62.8 kg (138 lb 7.2 oz)   SpO2 97%   BMI 27.27 kg/m²       Voiding status: voiding  Output (mL)  Urine Voided: 200 ml (06/11/20 1345)  Last Bowel Movement Date: 06/10/20 (06/11/20 1035)      Labs    Lab Results   Component Value Date/Time    HGB 13.8 06/02/2020 08:25 AM      Lab Results   Component Value Date/Time    INR 1.0 06/02/2020 08:25 AM      Lab Results   Component Value Date/Time    Sodium 140 06/02/2020 08:25 AM    Potassium 4.0 06/02/2020 08:25 AM    Chloride 109 (H) 06/02/2020 08:25 AM    CO2 28 06/02/2020 08:25 AM    Glucose 117 (H) 06/02/2020 08:25 AM    BUN 19 06/02/2020 08:25 AM    Creatinine 0.63 06/02/2020 08:25 AM    Calcium 8.7 06/02/2020 08:25 AM     Recent Glucose Results:   Lab Results   Component Value Date/Time    GLUCPOC 188 (H) 06/11/2020 04:06 PM    GLUCPOC 196 (H) 06/11/2020 11:08 AM    GLUCPOC 106 (H) 06/11/2020 09:12 AM           Body mass index is 27.27 kg/m². : A BMI > 30 is classified as obesity and > 40 is classified as morbid obesity. Gauze and elastic bandage dressing c.d.i  Cryotherapy in place over incision  Calves soft and supple;  No pain with passive stretch  Sensation and motor intact - PF/DF/EHL intact 5/5  SCDs for mechanical DVT proph while in bed     PLAN:  1) PT BID - WBAT  2) Aspirin 81 mg PO BID for DVT Prophylaxis   3) GI Prophylaxis - Pepcid  4) Readniess for discharge:     [x] Vital Signs stable    [] Hgb stable - check in am    [x] + Voiding    [x] Wound intact, drainage minimal    [x] Tolerating PO intake     [] Cleared by PT (OT if applicable)     [] Stair training completed (if applicable)    [] Independent / Contact Guard Assist (household distance)     [] Bed mobility     [] Car transfers     [] ADLs    [x] Adequate pain control on oral medication alone     Discharge pending PT clearance. Plans to return home with 's support and HH. Rx e-prescribed per request.      Family Communication Note:     Called and spoke with patient's , Sara Pyle. Updated on patient's status and informed of plan for discharge tomorrow. He will be patient's primary caregiver at home as well as transportation home. Encouraged to be present for discharge teaching and gave estimated discharge time around 10a-12p tomorrow. Will provide screening desk with advocate's name, so he can be escorted to room and present for discharge teaching.      Clara Tran, NP

## 2020-06-11 NOTE — PROGRESS NOTES
Orthopedic End of Shift Note    Bedside shift change report given to Isabela Armando RN (oncoming nurse) by Chivo Gerber RN (offgoing nurse). Report included the following information SBAR, Kardex, OR Summary, Procedure Summary, Intake/Output, MAR, Accordion and Recent Results.      POD#     Significant issues during shift: N/V dizziness    Issues for Physician to address: none    Activity This Shift  (check all that apply) [] chair  [] dangle   [x] bathroom  [] bedside commode [] hallway  [] bedrest   Nausea/Vomiting [x] yes [] no     Voiding Status [x] void [] Vera [] I&O Cath   Bowel Movements [] yes [x] no     Foot Pumps or SCD [x] yes [] no    Ice Pack [x] yes    [] no    Incentive Spirometer [] yes [] no Volume:      Telemetry Monitoring   [] yes [x] no Rhythm:   Supplemental O2 [] yes [x] no Sat off O2:   97%

## 2020-06-11 NOTE — PROGRESS NOTES
Problem: Mobility Impaired (Adult and Pediatric)  Goal: *Acute Goals and Plan of Care (Insert Text)  Description: FUNCTIONAL STATUS PRIOR TO ADMISSION: Patient was independent and active without use of DME. Denies history of falls. Still driving. Retired. Enjoys reading. HOME SUPPORT PRIOR TO ADMISSION: The patient lived with  but did not require assist.    Physical Therapy Goals  Initiated 6/11/2020    1. Patient will move from supine to sit and sit to supine , scoot up and down and roll side to side in bed with modified independence within 4 days. 2. Patient will perform sit to stand with modified independence within 4 days. 3. Patient will ambulate with modified independence for 300 feet with the least restrictive device within 4 days. 5. Patient will perform home exercise program per protocol with independence within 4 days. 6. Patient will demonstrate AROM 0-90 degrees in operative joint within 4 days. Outcome: Progressing Towards Goal   PHYSICAL THERAPY EVALUATION  Patient: Ibis Reardon (29 y.o. female)  Date: 6/11/2020  Primary Diagnosis: Status post total knee replacement [Z96.659]  Procedure(s) (LRB):  LEFT TOTAL KNEE ARTHROPLASTY WITH NAVIGATION (Left) Day of Surgery   Precautions:   WBAT      ASSESSMENT  Based on the objective data described below, the patient presents with increased pain levels, decreased activity tolerance, altered gait pattern, decreased AROM, and overall impaired functional mobility on POD 0 following L TKA. Pt with antalgic, step-to gait pattern in addition to significantly decreased gait speed during ambulation trial of 10ft x2 w/ RW and CGAx1. Secondary to 9/10 pain in L knee, pt only able to tolerate brief ambulation trial. Pt c/o dizziness during mobility however VS remained stable on RA throughout. Current Level of Function Impacting Discharge (mobility/balance):  CGAx1 with use of rolling walker during ambulation    Functional Outcome Measure: The patient scored 55/100 on the Barthel Index outcome measure which is indicative of moderate impairment in ADLs and functional mobility. Other factors to consider for discharge: pain levels     Patient will benefit from skilled therapy intervention to address the above noted impairments. PLAN :  Recommendations and Planned Interventions: bed mobility training, transfer training, gait training, therapeutic exercises, patient and family training/education, and therapeutic activities      Frequency/Duration: Patient will be followed by physical therapy:  twice daily to address goals. Recommendation for discharge: (in order for the patient to meet his/her long term goals)  Physical therapy at least 2 days/week in the home     This discharge recommendation:  Has been made in collaboration with the attending provider and/or case management    IF patient discharges home will need the following DME: patient owns DME required for discharge         SUBJECTIVE:   Patient stated I am feeling nauseous and dizzy, fuzzy in my head.     OBJECTIVE DATA SUMMARY:   HISTORY:    Past Medical History:   Diagnosis Date    Arthritis     osteoarthritis    Chronic allergic rhinitis     Chronic pain     right knee    Diabetes (HCC)     diet controlled    GERD (gastroesophageal reflux disease)     Hypercholesterolemia     Hypertension     Nausea & vomiting     Pancreatitis      Past Surgical History:   Procedure Laterality Date    ABDOMEN SURGERY PROC UNLISTED  11/2019    gallbladder    HX ORTHOPAEDIC  9/18/13    RIGHT TOTAL KNEE ARTHROPLASTY    HX AISSATOU AND BSO      HX TUBAL LIGATION         Personal factors and/or comorbidities impacting plan of care:     Home Situation  Home Environment: Private residence  # Steps to Enter: 0  One/Two Story Residence: One story  Living Alone: No  Support Systems: Spouse/Significant Other/Partner  Patient Expects to be Discharged to[de-identified] Private residence  Current DME Used/Available at Home: Walker, rolling, Shower chair, Commode, bedside  Tub or Shower Type: Shower    EXAMINATION/PRESENTATION/DECISION MAKING:   Critical Behavior:  Neurologic State: Alert  Orientation Level: Oriented X4  Cognition: Follows commands     Hearing:     Skin:  dressing clean, dry, intact  Edema: none noted   Range Of Motion:  AROM: Generally decreased, functional                       Strength:    Strength: Generally decreased, functional                    Tone & Sensation:   Tone: Normal              Sensation: Intact               Coordination:  Coordination: Within functional limits  Vision:      Functional Mobility:  Bed Mobility:  Rolling: Independent  Supine to Sit: Independent  Sit to Supine: Independent  Scooting: Independent  Transfers:  Sit to Stand: Contact guard assistance  Stand to Sit: Contact guard assistance                       Balance:   Sitting: Intact  Standing: With support; Impaired(RW)  Standing - Static: Good;Constant support  Standing - Dynamic : Fair;Constant support  Ambulation/Gait Training:  Distance (ft): 20 Feet (ft)(10ft x2 w/ seated rest break b/t)  Assistive Device: Walker, rolling;Gait belt  Ambulation - Level of Assistance: Contact guard assistance        Gait Abnormalities: Antalgic; Step to gait              Speed/Marsha: Pace decreased (<100 feet/min)  Step Length: Left shortened;Right shortened                Functional Measure:  Barthel Index:    Bathin  Bladder: 10  Bowels: 10  Groomin  Dressin  Feeding: 10  Mobility: 0  Stairs: 0  Toilet Use: 5  Transfer (Bed to Chair and Back): 10  Total: 55/100       The Barthel ADL Index: Guidelines  1. The index should be used as a record of what a patient does, not as a record of what a patient could do. 2. The main aim is to establish degree of independence from any help, physical or verbal, however minor and for whatever reason. 3. The need for supervision renders the patient not independent.   4. A patient's performance should be established using the best available evidence. Asking the patient, friends/relatives and nurses are the usual sources, but direct observation and common sense are also important. However direct testing is not needed. 5. Usually the patient's performance over the preceding 24-48 hours is important, but occasionally longer periods will be relevant. 6. Middle categories imply that the patient supplies over 50 per cent of the effort. 7. Use of aids to be independent is allowed. Jody Bergeron., Barthel, D.W. (1448). Functional evaluation: the Barthel Index. [facility] (14)2. Adelaide Allan vinayak JJ [place], Aixa Nugent., Severa Mouse., [place], [address] (). Measuring the change indisability after inpatient rehabilitation; comparison of the responsiveness of the Barthel Index and Functional [place] Measure. Journal of Neurology, Neurosurgery, and Psychiatry, 66(4), 999-287. Rahat Kennedy, N.J.A, RJ Lazo.MARSHALL, & Karina Ayon, MCourtneyA. (2004.) Assessment of post-stroke quality of life in cost-effectiveness studies: The usefulness of the Barthel Index and the EuroQoL-5D. Quality of Life Research, 15, 014-18           Physical Therapy Evaluation Charge Determination   History Examination Presentation Decision-Making   MEDIUM  Complexity : 1-2 comorbidities / personal factors will impact the outcome/ POC  MEDIUM Complexity : 3 Standardized tests and measures addressing body structure, function, activity limitation and / or participation in recreation  MEDIUM Complexity : Evolving with changing characteristics  MEDIUM Complexity : FOTO score of 26-74      Based on the above components, the patient evaluation is determined to be of the following complexity level: MEDIUM    Pain Ratin/10 pain in L knee    Activity Tolerance:   Good  Please refer to the flowsheet for vital signs taken during this treatment.     After treatment patient left in no apparent distress:   Supine in bed, Heels elevated for pressure relief, and Call bell within reach    COMMUNICATION/EDUCATION:   The patients plan of care was discussed with: Registered nurse. Fall prevention education was provided and the patient/caregiver indicated understanding., Patient/family have participated as able in goal setting and plan of care. , and Patient/family agree to work toward stated goals and plan of care.     Thank you for this referral.  Dana Collins, PT, DPT   Time Calculation: 18 mins

## 2020-06-11 NOTE — OP NOTES
Danny Cazares MD - Adult Reconstruction and Total Joint Replacement    Jack Glez - MRN [id number] - : 1945 (76 y.o.)    Date: 2020    PREOPERATIVE DIAGNOSIS:  Left knee degenerative joint disease    POSTOPERATIVE DIAGNOSIS:  Same    PROCEDURE: Total knee replacement with imageless computer navigation    Implants Used:   Implant Name Type Inv. Item Serial No.  Lot No. LRB No. Used Action   CEMENT BNE [place] 40/20GM - SN/A  CEMENT BNE [place] 40/20GM N/A [facility] [id number] Left 1 Implanted   FEM PS CHERYL LT SZ 2.5 -- [employer]ANT - [id number]  FEM PS CHERYL LT SZ 2.5 -- [employer]ULIANT [id number] [employer] N/A Left 1 Implanted   TRAY FIT TIB CHERYL SZ 2.5F/2.5T -- [employer] REV - [id number]  TRAY FIT TIB CHERYL SZ 2.5F/2.5T -- [employer] [id number] [employer] N/A Left 1 Implanted   INSERT TIB PSC SZ2.5 9MM -- [employer] - [id number]  INSERT TIB PSC SZ2.5 9MM -- [employer] [id number] [employer] N/A Left 1 Implanted       Anesthesia: General + block / local    Pre-operative antibiotic: Ancef    Surgeon: Danny Cazares     Assist: YANNA Hooker (Performing all or most of the following assistant-at-surgery services including but not limited to: proper patient positioning, sterile/prep and draping, placement of instruments/trackers, operative exposure, minor portions of bone / soft tissue excision, final irrigation and debridement, deep and superficial closure, application of final dressings)    EBL: minimal    Drains: none     Specimens: none     Complications: none     Condition: stable to PACU     INDICATIONS: Longstanding knee pain unresponsive to conservative measures. The risks, benefits, and alternatives to the procedure were explained to the patient and they wished to proceed. They understood no guarantees could be given about the outcome of the procedure.     DESCRIPTION OF PROCEDURE:  The patient was brought in to the operating room and placed supine on a standard OR table. Anesthesia was provided by the anesthesia team without difficulty. A thigh tourniquet was applied to the operative limb which was then prepped and draped in the usual fashion. Pre-operative antibiotics were administered. An appropriate time-out was performed. The limb was exsanguinated with an Esmarch and tourniquet inflated. A standard medial parapatellar arthrotomy was used. The fat pad was excised. The patella was then subluxed laterally and attention turned to the femur. Navigation was used after the registration sequence to make the appropriate distal femoral cut, and drill for the lugs of the 4-in-1 guide. The femoral cut was confirmed with navigation. The cruciate ligaments were excised along with the anterior portions of the menisci. The  4-in-1 guide position was confirmed with navigation and pinned in parallel to the epicondylar axis and perpendicular to [place]'s line. The anterior, posterior and chamfer cuts were performed, protecting the collateral ligaments at all times. The posterior capsule was cleared and any osteophytes were removed. The box cut was then made. Attention was then turned to the tibia. The navigation system was used to perform the tibial cut perpendicular to the mechanical axis. The remainder of the menisci were excised and the tibia sized. The patella was noted to be in acceptable condition and was not resurfaced. Selective denervation was performed. Trial components were then placed and the knee taken through a range of motion and found to have excellent range of motion, stability, and ligamentous balance. The rotation of the tibial tray was marked and the trials removed. The trial tibial tray was reinserted and the tibial prepared for the keel of the tray. All cut surfaces were thoroughly lavaged and dried. The final implants were then cemented into place and excess cement removed with a curette.  The tibial tray liner was inserted into the locking mechanism and the knee reduced. It was again taken through a range of motion and found to be stable in all planes with excellent tracking of the patella. The wound was thoroughly lavaged again. The extensor mechanism was repaired with heavy interrupted suture and a running stitch. Periarticular injection was performed. Skin closure was then performed in layers. Sterile compressive dressings were applied. The patient was awakened, moved to the stretcher and taken to the recovery room in stable condition. At the conclusion of the procedure, all counts were correct. There no immediate complications.

## 2020-06-11 NOTE — ANESTHESIA PROCEDURE NOTES
Peripheral Block    Start time: 6/11/2020 7:15 AM  Performed by: Pippa Blevins MD  Authorized by: Pippa Blevins MD       Pre-procedure: Indications: at surgeon's request and post-op pain management    Preanesthetic Checklist: patient identified, risks and benefits discussed, site marked, timeout performed and patient being monitored    Timeout Time: 07:15          Block Type:   Block Type:   Adductor canal  Laterality:  Left  Monitoring:  Standard ASA monitoring, continuous pulse ox, frequent vital sign checks, heart rate, responsive to questions and oxygen  Injection Technique:  Single shot  Procedures: ultrasound guided    Patient Position: supine  Prep: chlorhexidine    Location:  Mid thigh  Needle Type:  Stimuplex  Needle Gauge:  21 G  Needle Localization:  Ultrasound guidance and anatomical landmarks    Assessment:  Number of attempts:  1  Injection Assessment:  Incremental injection every 5 mL, local visualized surrounding nerve on ultrasound, negative aspiration for blood, no paresthesia, no intravascular symptoms and ultrasound image on chart  Patient tolerance:  Patient tolerated the procedure well with no immediate complications

## 2020-06-11 NOTE — PERIOP NOTES
Handoff Report from Operating Room to PACU    Report received from Chava Brand RN and Valeria Hanson CRNA regarding Bobbi Ho. Surgeon(s):  Joslyn Denton MD  And Procedure(s) (LRB):  LEFT TOTAL KNEE ARTHROPLASTY WITH NAVIGATION (Left)  confirmed   with allergies and dressings discussed. Anesthesia type, drugs, patient history, complications, estimated blood loss, vital signs, intake and output, and last pain medication, lines, reversal medications and temperature were reviewed.

## 2020-06-11 NOTE — PERIOP NOTES
TRANSFER - OUT REPORT:    Verbal report given to Maximilian RN (name) on Kerrie Ferraro  being transferred to [facility] (unit) for routine post - op       Report consisted of patients Situation, Background, Assessment and   Recommendations(SBAR). Information from the following report(s) SBAR, Kardex, OR Summary, Procedure Summary, Intake/Output and MAR was reviewed with the receiving nurse. Lines:   Peripheral IV 06/11/20 Right Arm (Active)   Site Assessment Clean, dry, & intact 6/11/2020  9:05 AM   Phlebitis Assessment 0 6/11/2020  9:05 AM   Infiltration Assessment 0 6/11/2020  9:05 AM   Dressing Status Clean, dry, & intact 6/11/2020  9:05 AM   Dressing Type Tape;Transparent 6/11/2020  9:05 AM   Hub Color/Line Status Pink; Infusing 6/11/2020  9:05 AM        Opportunity for questions and clarification was provided. Patient transported with:   O2 @ 2 liters  Tech     Patient belonging bag on stretcher at time of transfer.

## 2020-06-11 NOTE — H&P
Aristides Hawthorne MD - Adult Reconstruction and Total Joint Replacement     Orthopaedic History and Physical        NAME: Herbert Rae       :  1945       MRN:  [id number]      Subjective:   Patient ID: Dave Cristobal is a 76 y.o. female. Chief Complaint: Pain of the Right Knee    Patient presents for pain of the right knee. She reports her pain is present with weightbearing and long periods of sitting. Her pain began within the right year but she also reports that her pain is similar to arthritic pain she felt in her L knee before undergoing an L TKA. She reports a previous steroid injection in December that provided great relief for a couple of weeks. No complaints of the contralateral side. Of note: patient has a hx of L TKA    Sx note: Patient notes that Oxycodone and Tramadol gives unwanted side effects. Patient Active Problem List    Diagnosis Date Noted    Oral thrush 2018    Diarrhea 2018    Pancreatitis 10/26/2018     Past Medical History:   Diagnosis Date    Arthritis     osteoarthritis    Chronic allergic rhinitis     Chronic pain     right knee    Diabetes ([address]     diet controlled    GERD (gastroesophageal reflux disease)     Hypercholesterolemia     Hypertension     Nausea & vomiting     Pancreatitis       Past Surgical History:   Procedure Laterality Date    ABDOMEN SURGERY PROC UNLISTED  2019    gallbladder    HX ORTHOPAEDIC  13    RIGHT TOTAL KNEE ARTHROPLASTY    HX AISSATOU AND BSO      HX TUBAL LIGATION        Prior to Admission medications    Medication Sig Start Date End Date Taking? Authorizing Provider   cholecalciferol (Vitamin D3) (1000 Units /25 mcg) tablet Take 1,000 Units by mouth daily. Yes Provider, Historical   vit B Cmplx 3-FA-Vit C-Biotin (NEPHRO DONNELL RX) 1- mg-mg-mcg tablet Take 1 Tab by mouth daily. Yes Provider, Historical   ZINC ACETATE PO Take 500 mg by mouth daily.    Yes Provider, Historical   cyanocobalamin (Vitamin B-12) 1,000 mcg tablet Take 5,000 mcg by mouth daily. Yes Provider, Historical   Magnesium Oxide 500 mg cap Take 500 mg by mouth daily. Yes Provider, Historical   chromium picolinate 400 mcg tab Take 800 mg by mouth daily. Yes Provider, Historical   ISRAEL ROOT, BULK, Take 550 mg by mouth daily. Yes Provider, Historical   ibuprofen (MOTRIN) 400 mg tablet Take 1 Tab by mouth three (3) times daily (with meals). May use over-the-counter equivalent instead. 12/5/18  Yes Paige Navarrete MD   polyethylene glycol [facility]) 17 gram/dose powder Take 17 g by mouth daily. 12/5/18  Yes Paige Navarrete MD   Biotin 2,500 mcg cap Take  by mouth. Yes Provider, Historical   ascorbic acid (LETHA-C PO) Take  by mouth. Yes Provider, Historical   coenzyme q10 (CO Q-10) 10 mg cap Take  by mouth. Yes Provider, Historical   famotidine (PEPCID) 20 mg tablet Take 1 Tab by mouth two (2) times a day. 11/3/18  Yes Salome Clay MD   L. acidoph & paracasei- S therm- Bifido (MICHAELLE-Q/RISAQUAD) 8 billion cell cap cap Take 1 Cap by mouth daily. 11/4/18  Yes Salome Clay MD   bumetanide (BUMEX) 0.5 mg tablet Take 1 Tab by mouth daily. 11/3/18  Yes Salome Clay MD   aspirin delayed-release 81 mg tablet Take 81 mg by mouth daily. Yes Other, MD Radha   amLODIPine (NORVASC) 5 mg tablet Take 5 mg by mouth daily. Indications: HYPERTENSION   Yes Provider, Historical   potassium chloride SR (KLOR-CON 10) 10 mEq tablet Take 20 mEq by mouth two (2) times a day. Yes Provider, Historical   fexofenadine-pseudoephedrine (Allegra-D 24 Hour) 180-240 mg per tablet Take 1 Tab by mouth daily. Provider, Historical   cycloSPORINE (RESTASIS) 0.05 % ophthalmic emulsion Administer 1 Drop to left eye two (2) times a day.  Indications: DRY EYE    Provider, Historical     Allergies   Allergen Reactions    Morphine (Pf) Vertigo    Oxycodone Nausea and Vomiting      Social History     Tobacco Use    Smoking status: Never Smoker    Smokeless tobacco: Never Used   Substance Use Topics    Alcohol use: No      Family History   Problem Relation Age of Onset    Cancer Father         lung        REVIEW OF SYSTEMS: A comprehensive review of systems was negative except for that written in the HPI. Objective:   Constitutional: She appears stated age. Pt is cooperative and is in no acute distress. Well nourished. Well developed. Body habitus is overweight. Body mass index is 26.95 kg/m². Assistive devices: none  Eyes: Sclera are nonicteric. Respiratory: No labored breathing. Cardiovascular: No marked edema. Well perfused extremities bilaterally. Skin: No marked skin ulcers. No lymphedema or skin abnormalities. Neurological: No marked sensory loss noted. Grossly neurovascularly intact. Both lower extremities are intact to distal sensory and motor function. Psychiatric: Alert and oriented x3. MUSCULOSKELETAL: Lumbar spine is nonfocal. No obvious LLD. 5/5 BLE strength. Left total knee incision is benign. Normal alignment. Full extension and flexion greater than 120 degrees with no instability. Right knee with significant joint line tenderness both medially and laterally. 0-120 degrees of motion. Valgus alignment that passively corrects. Mild crepitation. No effusion. Radiographs:         X-ray Knee Left 3 Views ([id number])    Result Date: 3/7/2020  Views: Standing AP, Lat, [place]. Impression: Significant tricompartmental degenerative changes with severe lateral joint space collapse. Valgus angle noted on x-ray. Multiple marginal osteophytes. No signs of fracture. Assessment:     ICD-10-CM   1. Primary osteoarthritis of right knee M17.11       Plan: At this time, I explained to the patient the potential risks of treating a valgus aligned arthritic knee, but reassured her that in her case, and due to her prior hx of TKA without complication, she should no expect any nerve problems.  Conservative treatments including activity modification, medication, and steroid injections have not successfully relieved pain. Surgery was discussed at length with the pt today. We went over all the pertinent risks, benefits, and alternatives to the procedure. They understand no guarantees can be made about the outcome and they would like to proceed. I discussed the pre-op total joint class and general recovery timeline with the pt. The pt understands the need for medical clearance. We will plan for the R TKA on 04/30/2020    Sx note: Patient notes that Oxycodone and Tramadol give unwanted side effects.     Scribed for Dr. Kamaljit Soler by YANNA De Oliveira

## 2020-06-11 NOTE — H&P
Date of Surgery Update:  Tegan San was seen and examined on the day of surgery prior to the procedure. There were no significant clinical changes since the completion of the History and Physical.    Exam today prior to surgery showed no acute cardiac findings, no respiratory difficulty, and no abdominal complaints or pain. This patient is a candidate for TXA. The patient was counseled at length about the risks of sherine Covid-19 during their perioperative period and any recovery window from their procedure. The patient was made aware that sherine Covid-19  may worsen their prognosis for recovering from their procedure and lend to a higher morbidity and/or mortality risk. All material risks, benefits, and reasonable alternatives including postponing the procedure were discussed. The patient does wish to proceed with the procedure at this time. Documentation of Medical Necessity:    Symptoms: pain with activity and at rest, antalgia, interferes with ADLs    Conservative Treatment: activity modification, multiple medications, injection    Physical Findings: valgus, pain at joint line, antalgia on ambulation, crepitation    Imaging: significant OA, sclerosis and osteophytes, valgus    Indications:   Failure of conservative treatments with daily pain and functional limitations. Appropriate imaging demonstrating significant disease. Appropriate physical findings consistent with significant degenerative joint disease. All pertinent risks, benefits, and alternatives to operative management including continued conservative care were explained at length. The patient has elected to proceed with appropriately indicated and medically necessary total joint arthroplasty. They understand no guarantees can be given about the outcome. *They will be admitted as an inpatient because of the need for intensive therapy and post-anesthesia monitoring.     Signed By: YANNA Figueroa June 11, 2020 7:37 AM

## 2020-06-11 NOTE — PERIOP NOTES
Patient: Vu Onofre MRN: [id number]  SSN: [id number]   YOB: 1945  Age: 76 y.o. Sex: female     Patient is status post Procedure(s):  LEFT TOTAL KNEE ARTHROPLASTY WITH NAVIGATION. Surgeon(s) and Role:     Gabriel Topete MD - Primary    Local/Dose/Irrigation:  20ml 0.5% ropivicaine                  Peripheral IV 06/11/20 Right Arm (Active)   Site Assessment Clean, dry, & intact 6/11/2020  6:26 AM   Phlebitis Assessment 0 6/11/2020  6:26 AM   Infiltration Assessment 0 6/11/2020  6:26 AM   Dressing Status Clean, dry, & intact 6/11/2020  6:26 AM   Dressing Type Transparent;Tape 6/11/2020  6:26 AM   Hub Color/Line Status Pink; Infusing 6/11/2020  6:26 AM            Airway - Endotracheal Tube 06/11/20 Oral (Active)                   Dressing/Packing:  Wound Knee Left Sutures, exofin fusion, 4x4s, ABD pad, cast padding, ace-Dressing Type: Topical skin adhesive/glue;4 x 4;ABD pad;Cast padding;Elastic bandage (06/11/20 0803)    Splint/Cast:  ]

## 2020-06-11 NOTE — ANESTHESIA PREPROCEDURE EVALUATION
Anesthetic History     PONV          Review of Systems / Medical History  Patient summary reviewed, nursing notes reviewed and pertinent labs reviewed    Pulmonary  Within defined limits                 Neuro/Psych   Within defined limits           Cardiovascular    Hypertension              Exercise tolerance: >4 METS     GI/Hepatic/Renal     GERD           Endo/Other    Diabetes    Arthritis     Other Findings              Physical Exam    Airway  Mallampati: I  TM Distance: 4 - 6 cm  Neck ROM: normal range of motion   Mouth opening: Normal     Cardiovascular  Regular rate and rhythm,  S1 and S2 normal,  no murmur, click, rub, or gallop             Dental  No notable dental hx       Pulmonary  Breath sounds clear to auscultation               Abdominal  GI exam deferred       Other Findings            Anesthetic Plan    ASA: 2  Anesthesia type: general and regional - saphenous block    Monitoring Plan: BIS      Induction: Intravenous  Anesthetic plan and risks discussed with: Patient

## 2020-06-12 VITALS
WEIGHT: 138.45 LBS | HEART RATE: 72 BPM | HEIGHT: 60 IN | OXYGEN SATURATION: 95 % | BODY MASS INDEX: 27.18 KG/M2 | SYSTOLIC BLOOD PRESSURE: 134 MMHG | TEMPERATURE: 97.9 F | DIASTOLIC BLOOD PRESSURE: 53 MMHG | RESPIRATION RATE: 18 BRPM

## 2020-06-12 LAB
ANION GAP SERPL CALC-SCNC: 6 MMOL/L (ref 5–15)
BUN SERPL-MCNC: 13 MG/DL (ref 6–20)
BUN/CREAT SERPL: 17 (ref 12–20)
CALCIUM SERPL-MCNC: 8 MG/DL (ref 8.5–10.1)
CHLORIDE SERPL-SCNC: 103 MMOL/L (ref 97–108)
CO2 SERPL-SCNC: 26 MMOL/L (ref 21–32)
CREAT SERPL-MCNC: 0.76 MG/DL (ref 0.55–1.02)
GLUCOSE BLD STRIP.AUTO-MCNC: 109 MG/DL (ref 65–100)
GLUCOSE SERPL-MCNC: 138 MG/DL (ref 65–100)
HGB BLD-MCNC: 12.1 G/DL (ref 11.5–16)
POTASSIUM SERPL-SCNC: 3.6 MMOL/L (ref 3.5–5.1)
SERVICE CMNT-IMP: ABNORMAL
SODIUM SERPL-SCNC: 135 MMOL/L (ref 136–145)

## 2020-06-12 PROCEDURE — 97116 GAIT TRAINING THERAPY: CPT

## 2020-06-12 PROCEDURE — 74011250637 HC RX REV CODE- 250/637: Performed by: PHYSICIAN ASSISTANT

## 2020-06-12 PROCEDURE — 97530 THERAPEUTIC ACTIVITIES: CPT

## 2020-06-12 PROCEDURE — 94760 N-INVAS EAR/PLS OXIMETRY 1: CPT

## 2020-06-12 PROCEDURE — 36415 COLL VENOUS BLD VENIPUNCTURE: CPT

## 2020-06-12 PROCEDURE — 74011250637 HC RX REV CODE- 250/637: Performed by: ORTHOPAEDIC SURGERY

## 2020-06-12 PROCEDURE — 82962 GLUCOSE BLOOD TEST: CPT

## 2020-06-12 PROCEDURE — 80048 BASIC METABOLIC PNL TOTAL CA: CPT

## 2020-06-12 PROCEDURE — 85018 HEMOGLOBIN: CPT

## 2020-06-12 PROCEDURE — 74011250636 HC RX REV CODE- 250/636: Performed by: ORTHOPAEDIC SURGERY

## 2020-06-12 RX ORDER — TRAMADOL HYDROCHLORIDE 50 MG/1
50-100 TABLET ORAL
Qty: 40 TAB | Refills: 0 | Status: SHIPPED | OUTPATIENT
Start: 2020-06-12 | End: 2020-06-19

## 2020-06-12 RX ADMIN — Medication 10 ML: at 06:32

## 2020-06-12 RX ADMIN — TRAMADOL HYDROCHLORIDE 50 MG: 50 TABLET, FILM COATED ORAL at 03:39

## 2020-06-12 RX ADMIN — POTASSIUM CHLORIDE 20 MEQ: 750 TABLET, FILM COATED, EXTENDED RELEASE ORAL at 08:55

## 2020-06-12 RX ADMIN — ACETAMINOPHEN 650 MG: 325 TABLET, FILM COATED ORAL at 11:00

## 2020-06-12 RX ADMIN — ACETAMINOPHEN 650 MG: 325 TABLET, FILM COATED ORAL at 06:32

## 2020-06-12 RX ADMIN — SENNOSIDES AND DOCUSATE SODIUM 1 TABLET: 8.6; 5 TABLET ORAL at 08:54

## 2020-06-12 RX ADMIN — BUMETANIDE 0.5 MG: 1 TABLET ORAL at 08:55

## 2020-06-12 RX ADMIN — POLYETHYLENE GLYCOL 3350 17 G: 17 POWDER, FOR SOLUTION ORAL at 08:54

## 2020-06-12 RX ADMIN — CELECOXIB 200 MG: 200 CAPSULE ORAL at 08:55

## 2020-06-12 RX ADMIN — FAMOTIDINE 20 MG: 20 TABLET, FILM COATED ORAL at 08:55

## 2020-06-12 RX ADMIN — Medication 1 AMPULE: at 08:57

## 2020-06-12 RX ADMIN — DEXAMETHASONE SODIUM PHOSPHATE 10 MG: 4 INJECTION, SOLUTION INTRAMUSCULAR; INTRAVENOUS at 08:56

## 2020-06-12 RX ADMIN — TRAMADOL HYDROCHLORIDE 50 MG: 50 TABLET, FILM COATED ORAL at 09:56

## 2020-06-12 RX ADMIN — ASPIRIN 81 MG: 81 TABLET, COATED ORAL at 08:55

## 2020-06-12 NOTE — PROGRESS NOTES
Pt and  given education regarding discharge instructions, prescriptions, prescription literature, and extra dressings. Opportunities for questions given. PIV taken out with cath tip intact.  [facility] utilized to transport pt down to [facility] to be discharged home with personal belongings,  and home health

## 2020-06-12 NOTE — PROGRESS NOTES
Bedside shift change report given to Mariah Encarnacion RN (oncoming nurse) by Bety Bonds RN (offgoing nurse). Report included the following information SBAR, Kardex, OR Summary, Intake/Output and MAR.

## 2020-06-12 NOTE — PROGRESS NOTES
Ortho / Neurosurgery NP Note    POD# 1  s/p LEFT TOTAL KNEE ARTHROPLASTY WITH NAVIGATION     Pt resting in chair  Cleared therapy and no dizziness this morning. Nausea resolved with Zofran yesterday - none since  Did not tolerate Dilaudid. Tolerated Tramadol w/o issue - will change Rx escribe to [facility]   Tolerating clears and crackers. VSS Afebrile. Room air. Visit Vitals  /53 (BP 1 Location: Left arm, BP Patient Position: Sitting)   Pulse 72   Temp 97.9 °F (36.6 °C)   Resp 18   Ht 4' 11.75\" (1.518 m)   Wt 62.8 kg (138 lb 7.2 oz)   SpO2 95%   BMI 27.27 kg/m²       Voiding status: voiding  Output (mL)  Urine Voided: 300 ml (06/12/20 0801)  Last Bowel Movement Date: 06/10/20 (06/11/20 2000)      Labs    Lab Results   Component Value Date/Time    HGB 12.1 06/12/2020 03:42 AM      Lab Results   Component Value Date/Time    INR 1.0 06/02/2020 08:25 AM      Lab Results   Component Value Date/Time    Sodium 135 (L) 06/12/2020 03:42 AM    Potassium 3.6 06/12/2020 03:42 AM    Chloride 103 06/12/2020 03:42 AM    CO2 26 06/12/2020 03:42 AM    Glucose 138 (H) 06/12/2020 03:42 AM    BUN 13 06/12/2020 03:42 AM    Creatinine 0.76 06/12/2020 03:42 AM    Calcium 8.0 (L) 06/12/2020 03:42 AM     Recent Glucose Results:   Lab Results   Component Value Date/Time     (H) 06/12/2020 03:42 AM    GLUCPOC 109 (H) 06/12/2020 07:47 AM    GLUCPOC 141 (H) 06/11/2020 09:29 PM    GLUCPOC 188 (H) 06/11/2020 04:06 PM           Body mass index is 27.27 kg/m². : A BMI > 30 is classified as obesity and > 40 is classified as morbid obesity. Gauze and elastic bandage dressing removed  [place] underneath c.d.i  Cryotherapy in place over incision  Calves soft and supple;  No pain with passive stretch  Sensation and motor intact - PF/DF/EHL intact 5/5  SCDs for mechanical DVT proph while in bed     PLAN:  1) PT BID - WBAT  2) Aspirin 81 mg PO BID for DVT Prophylaxis   3) GI Prophylaxis - Pepcid  4) Readniess for discharge:     [x] Vital Signs stable    [x] Hgb stable    [x] + Voiding    [x] Wound intact, drainage minimal    [x] Tolerating PO intake     [x] Cleared by PT (OT if applicable)     [x] Stair training completed (if applicable)    [x] Independent / Contact Guard Assist (household distance)     [x] Bed mobility     [x] Car transfers     [x] ADLs    [x] Adequate pain control on oral medication alone     Discharge home today -   coming to pick her up.     Jay Grandchild, NP

## 2020-06-12 NOTE — PHYSICIAN ADVISORY
Short Stay Review       Pt Name:  Toña Dos Santos   MR#  [id number]   [facility]#   [id number]   [address]  [phone]  @ [facility]   Hospitalization date  6/11/2020  5:43 AM  No discharge date for patient encounter. Current Attending Physician  Lizette Conner MD     A discharge order has been placed for this episode of hospital care for Ms. Toña Dos Santos; since this hospital stay is less than two midnights, I reviewed Ms. Mary Bruce's chart. Ms. Mary Bruce's healthcare insurance/benefit include:  Payor: [place] MEDICARE / Plan: VA MEDICARE PART A & B / Product Type: Medicare /     Utilization Review related case summary:   Age  76 y.o.   BMI  Body mass index is 27.27 kg/m². Functional status ASA Class  2   PMHx includes  DM2, HTN, OA, GERD    EKG  NSR with possible inferior ischemia - no telemetry ordered, or  other events documented during this episode of hospital care. Echo     Hospital course  Uncomplicated TKA    PT OT evaluation     Other Social/logistical issues     Risk of deterioration at the time this patient was hospitalized     Moderate            On the basis of chart review, this patient's hospitalization status      is appropriate for INPATIENT        The information in this document is a recommendation to be used for utilization review and utilization management purposes only. This recommendation is not an order. The recommendation is made based on the information reviewed at the time of the referral, is pursuant to the [place] [facility] Conditions of Participation (42 CFR Part 482), and is neither a judgment nor an assessment with regard to the appropriateness or quality of clinical care. For all Managed Care patients:  The Criteria are intended solely for use as screening guidelines with respect to the medical appropriateness of healthcare services and not for final clinical or payment determinations concerning the type or level of medical care provided, or proposed to be provided, to a patient. They help the reviewers determine whether a patient is appropriate for observation or inpatient admission at the time a decision to admit the patient is being made. All efforts are made to apply the pertinent payor criteria ([employer] or [employer]) as well as the clinical judgements based on the information reviewed at the time of the referral.\" Nothing in this document may be used to limit, alter, or affect clinical services provided to the patient named below.       Fercho Coppola MD MPH FACP   Cell: [phone]  Physician [address] [place] [address]   Utilization Review, Care Management         CSN:  [id number]   TATE:   [id number]  Admitted on :  6/11/2020   Discharge order

## 2020-06-12 NOTE — PROGRESS NOTES
Nausea has now subsided for 6 hours. Able to tolerate fluids and Tramadol for pain control.    Moving self very well in bed and onto bedpan due to earlier dizziness and Nausea

## 2020-06-12 NOTE — DISCHARGE SUMMARY
Ortho Discharge Summary    Patient ID:  Laura Kennedy  [id number]  female  76 y.o.  1945    Admit date: 6/11/2020    Discharge date: 6/12/2020    Admitting Physician: Santos Martinez MD     Consulting Physician(s):   Treatment Team: Attending Provider: Karla Mauro MD    Date of Surgery:   6/11/2020     Preoperative Diagnosis:  LEFT KNEE OSTEOARTHRITIS    Postoperative Diagnosis:   LEFT KNEE OSTEOARTHRITIS    Procedure(s):     LEFT TOTAL KNEE ARTHROPLASTY WITH NAVIGATION     Anesthesia Type:   General     Surgeon: Karla Mauro MD                            HPI:  Pt is a 76 y.o. female who has a history of LEFT KNEE OSTEOARTHRITIS  with pain and limitations of activities of daily living who presents at this time for a left TKA following the failure of conservative management. PMH:   Past Medical History:   Diagnosis Date    Arthritis     osteoarthritis    Chronic allergic rhinitis     Chronic pain     right knee    Diabetes (HCC)     diet controlled    GERD (gastroesophageal reflux disease)     Hypercholesterolemia     Hypertension     Nausea & vomiting     Pancreatitis        Body mass index is 27.27 kg/m². : A BMI > 30 is classified as obesity and > 40 is classified as morbid obesity. Medications upon admission :   Prior to Admission Medications   Prescriptions Last Dose Informant Patient Reported? Taking? Biotin 2,500 mcg cap 6/4/2020 at Unknown time  Yes Yes   Sig: Take  by mouth. ISRAEL ROOT, BULK, 6/4/2020 at Unknown time  Yes Yes   Sig: Take 550 mg by mouth daily. L. acidoph & paracasei- S therm- Bifido (MICHAELLE-Q/RISAQUAD) 8 billion cell cap cap 6/4/2020 at Unknown time  No Yes   Sig: Take 1 Cap by mouth daily. Magnesium Oxide 500 mg cap 6/4/2020 at Unknown time  Yes Yes   Sig: Take 500 mg by mouth daily. ZINC ACETATE PO 6/4/2020 at Unknown time  Yes Yes   Sig: Take 500 mg by mouth daily.    amLODIPine (NORVASC) 5 mg tablet 6/11/2020 at 0515  Yes Yes   Sig: Take 5 mg by mouth daily. Indications: HYPERTENSION   ascorbic acid (LETHA-C PO) 6/4/2020 at Unknown time  Yes Yes   Sig: Take  by mouth. aspirin delayed-release 81 mg tablet 6/4/2020 at Unknown time  Yes Yes   Sig: Take 81 mg by mouth daily. bumetanide (BUMEX) 0.5 mg tablet 6/10/2020 at Unknown time  No Yes   Sig: Take 1 Tab by mouth daily. cholecalciferol (Vitamin D3) (1000 Units /25 mcg) tablet 6/4/2020 at Unknown time  Yes Yes   Sig: Take 1,000 Units by mouth daily. chromium picolinate 400 mcg tab 6/4/2020 at Unknown time  Yes Yes   Sig: Take 800 mg by mouth daily. coenzyme q10 (CO Q-10) 10 mg cap 6/4/2020 at Unknown time  Yes Yes   Sig: Take  by mouth.   cyanocobalamin (Vitamin B-12) 1,000 mcg tablet 6/4/2020 at Unknown time  Yes Yes   Sig: Take 5,000 mcg by mouth daily. cycloSPORINE (RESTASIS) 0.05 % ophthalmic emulsion Not Taking at Unknown time  Yes No   Sig: Administer 1 Drop to left eye two (2) times a day. Indications: DRY EYE   famotidine (PEPCID) 20 mg tablet 6/11/2020 at 0515  No Yes   Sig: Take 1 Tab by mouth two (2) times a day. fexofenadine-pseudoephedrine (Allegra-D 24 Hour) 180-240 mg per tablet 6/9/2020  Yes No   Sig: Take 1 Tab by mouth daily. ibuprofen (MOTRIN) 400 mg tablet 6/4/2020 at Unknown time  No Yes   Sig: Take 1 Tab by mouth three (3) times daily (with meals). May use over-the-counter equivalent instead. polyethylene glycol (MIRALAX) 17 gram/dose powder 6/4/2020 at Unknown time  No Yes   Sig: Take 17 g by mouth daily. potassium chloride SR (KLOR-CON 10) 10 mEq tablet 6/11/2020 at 0515  Yes Yes   Sig: Take 20 mEq by mouth two (2) times a day. vit B Cmplx 3-FA-Vit C-Biotin (NEPHRO DONNELL RX) 1- mg-mg-mcg tablet 6/4/2020 at Unknown time  Yes Yes   Sig: Take 1 Tab by mouth daily. Facility-Administered Medications: None        Allergies: Allergies   Allergen Reactions    Morphine (Pf) Vertigo    Oxycodone Nausea and Vomiting        Hospital Course:   The patient underwent surgery. Complications:  None; patient tolerated the procedure well. Was taken to the PACU in stable condition and then transferred to the ortho floor. Perioperative Antibiotics:  Ancef     Postoperative Pain Management:  Dilaudid - did not tolerate well  Sending home with RX for tramadol (escribed)    DVT Prophylaxis: Aspirin 81    Postoperative transfusions:    Number of units banked PRBCs =   none     Post Op complications: none    Hemoglobin at discharge:    Lab Results   Component Value Date/Time    HGB 12.1 06/12/2020 03:42 AM    INR 1.0 06/02/2020 08:25 AM       Dressing was changed on POD # 1. Incision - clean, dry and intact. No significant erythema or swelling. Neurovascular exam found to be within normal limits. Wound appears to be healing without any evidence of infection. Physical Therapy started following surgery and participated in bed mobility, transfers and ambulation. Gait:  Gait  Speed/Marsha: Pace decreased (<100 feet/min)  Step Length: Left shortened, Right shortened  Gait Abnormalities: Antalgic, Step to gait  Ambulation - Level of Assistance: Contact guard assistance  Distance (ft): 20 Feet (ft)(10ft x2 w/ seated rest break b/t)  Assistive Device: Walker, rolling, Gait belt                 Discharged to: Home. Condition on Discharge:   stable    Discharge instructions:  - Anticoagulate with Aspirin 81 BID  - Take pain medications as prescribed  - Resume pre hospital diet      - Discharge activity: activity as tolerated  - Ambulate with assistive device as needed. - Weight bearing status WBAT  - Wound Care Keep wound clean and dry. See discharge instruction sheet. -DISCHARGE MEDICATION LIST     Current Discharge Medication List      START taking these medications    Details   traMADoL (ULTRAM) 50 mg tablet Take 1-2 Tabs by mouth every six (6) hours as needed for Pain for up to 7 days. Max Daily Amount: 400 mg.   Qty: 40 Tab, Refills: 0    Associated Diagnoses: Status post total left knee replacement      acetaminophen (TYLENOL) 325 mg tablet Take 2 Tabs by mouth every six (6) hours for 14 days. Qty: 112 Tab, Refills: 0      senna-docusate (PERICOLACE) 8.6-50 mg per tablet Take 1 Tab by mouth two (2) times a day for 14 days. Qty: 28 Tab, Refills: 0         CONTINUE these medications which have CHANGED    Details   aspirin delayed-release 81 mg tablet Take 1 Tab by mouth two (2) times a day for 30 days. Qty: 60 Tab, Refills: 0         CONTINUE these medications which have NOT CHANGED    Details   cholecalciferol (Vitamin D3) (1000 Units /25 mcg) tablet Take 1,000 Units by mouth daily. vit B Cmplx 3-FA-Vit C-Biotin (NEPHRO DONNELL RX) 1- mg-mg-mcg tablet Take 1 Tab by mouth daily. ZINC ACETATE PO Take 500 mg by mouth daily. cyanocobalamin (Vitamin B-12) 1,000 mcg tablet Take 5,000 mcg by mouth daily. Magnesium Oxide 500 mg cap Take 500 mg by mouth daily. chromium picolinate 400 mcg tab Take 800 mg by mouth daily. ISRAEL ROOT, BULK, Take 550 mg by mouth daily. Biotin 2,500 mcg cap Take  by mouth. ascorbic acid (LETHA-C PO) Take  by mouth.      coenzyme q10 (CO Q-10) 10 mg cap Take  by mouth. famotidine (PEPCID) 20 mg tablet Take 1 Tab by mouth two (2) times a day. Qty: 60 Tab, Refills: 1      L. acidoph & paracasei- S therm- Bifido (MICHAELLE-Q/RISAQUAD) 8 billion cell cap cap Take 1 Cap by mouth daily. Qty: 30 Cap, Refills: 1      bumetanide (BUMEX) 0.5 mg tablet Take 1 Tab by mouth daily. Qty: 30 Tab, Refills: 1      amLODIPine (NORVASC) 5 mg tablet Take 5 mg by mouth daily. Indications: HYPERTENSION      potassium chloride SR (KLOR-CON 10) 10 mEq tablet Take 20 mEq by mouth two (2) times a day. fexofenadine-pseudoephedrine (Allegra-D 24 Hour) 180-240 mg per tablet Take 1 Tab by mouth daily. cycloSPORINE (RESTASIS) 0.05 % ophthalmic emulsion Administer 1 Drop to left eye two (2) times a day.  Indications: DRY EYE         STOP taking these medications       ibuprofen (MOTRIN) 400 mg tablet Comments:   Reason for Stopping:         polyethylene glycol (MIRALAX) 17 gram/dose powder Comments:   Reason for Stopping:            per medical continuation form      -Follow up in office in 2 weeks      Signed:  Luisana Ambrocio.  Arti Krause, ACNP-BC, ONP-C  Orthopaedic Nurse Practitioner    6/12/2020  10:06 AM

## 2020-06-12 NOTE — PROGRESS NOTES
Problem: Patient Education: Go to Patient Education Activity  Goal: Patient/Family Education  Outcome: Progressing Towards Goal     Problem: Falls - Risk of  Goal: *Absence of Falls  Description: Document Umang Davila Fall Risk and appropriate interventions in the flowsheet.   Outcome: Progressing Towards Goal  Note: Fall Risk Interventions:  Mobility Interventions: Assess mobility with egress test, Communicate number of staff needed for ambulation/transfer, OT consult for ADLs, Patient to call before getting OOB, PT Consult for mobility concerns, Strengthening exercises (ROM-active/passive), Utilize walker, cane, or other assistive device, PT Consult for assist device competence, Utilize gait belt for transfers/ambulation         Medication Interventions: Assess postural VS orthostatic hypotension, Evaluate medications/consider consulting pharmacy, Patient to call before getting OOB, Teach patient to arise slowly, Utilize gait belt for transfers/ambulation    Elimination Interventions: Call light in reach, Elevated toilet seat, Patient to call for help with toileting needs, Toilet paper/wipes in reach, Toileting schedule/hourly rounds, Stay With Me (per policy)              Problem: Patient Education: Go to Patient Education Activity  Goal: Patient/Family Education  Outcome: Progressing Towards Goal     Problem: Knee Replacement: Day of Surgery/Unit  Goal: Off Pathway (Use only if patient is Off Pathway)  Outcome: Resolved/Met  Goal: Activity/Safety  Outcome: Resolved/Met  Goal: Consults, if ordered  Outcome: Resolved/Met  Goal: Diagnostic Test/Procedures  Outcome: Resolved/Met  Goal: Nutrition/Diet  Outcome: Resolved/Met  Goal: Medications  Outcome: Resolved/Met  Goal: Respiratory  Outcome: Resolved/Met  Goal: Treatments/Interventions/Procedures  Outcome: Resolved/Met  Goal: Psychosocial  Outcome: Resolved/Met  Goal: *Initiate mobility  Outcome: Resolved/Met  Goal: *Optimal pain control at patient's stated goal  Outcome: Resolved/Met  Goal: *Hemodynamically stable  Outcome: Resolved/Met     Problem: Knee Replacement: Post-Op Day 1  Goal: Off Pathway (Use only if patient is Off Pathway)  Outcome: Progressing Towards Goal  Goal: Activity/Safety  Outcome: Progressing Towards Goal  Goal: Diagnostic Test/Procedures  Outcome: Progressing Towards Goal  Goal: Nutrition/Diet  Outcome: Progressing Towards Goal  Goal: Medications  Outcome: Progressing Towards Goal  Goal: Respiratory  Outcome: Progressing Towards Goal  Goal: Treatments/Interventions/Procedures  Outcome: Progressing Towards Goal  Goal: Psychosocial  Outcome: Progressing Towards Goal  Goal: Discharge Planning  Outcome: Progressing Towards Goal  Goal: *Demonstrates progressive activity  Outcome: Progressing Towards Goal  Goal: *Optimal pain control at patient's stated goal  Outcome: Progressing Towards Goal  Goal: *Hemodynamically stable  Outcome: Progressing Towards Goal  Goal: *Discharge plan identified  Outcome: Progressing Towards Goal

## 2020-06-12 NOTE — PROGRESS NOTES
Transition of Care   · Home Health: At home Care  · Ortho follow-up   ·  to provide assistance at discharge. Reason for Admission:   Left total knee replacement                   RUR Score:  8%                   Plan for utilizing home health:     Home health. At [facility]. FOC offered, copy signed and placed on chart. PCP: First and Last name:  Jack Gibson    Name of Practice:    Are you a current patient: Yes/No: Yes    Approximate date of last visit: May 26th    Can you participate in a virtual visit with your PCP:  Yes                     Current Advanced Directive/Advance Care Plan: Pt does have ACP, however is not on Pt's file. Transition of Care Plan:   Pt to discharge home with [facility]. At [facility] has accepted. Pt is a 76year old female admitted on 6/11/2020. Pt is alert and oriented x4 during initial assessment. Pt lives in a 1 story home with a step-up entrance. Pt has access to a cane walker shower chair and bedside commode at home. Pt has previous history of HH about 7 years ago, but could not recall the name of agency. Pt reports to have adequate family support. Preferred pharmacy is [facility] on [address]  And mail order with [facility] for routine medications. Care Management Interventions  PCP Verified by CM: Yes  Last Visit to PCP: 05/26/20  Mode of Transport at Discharge:  Other (see comment)(; private vehicle)  Transition of Care Consult (CM Consult): Discharge Planning, [address]: No  Reason Outside [place]: Physician referred to specific agency  Discharge Durable Medical Equipment: No  Physical Therapy Consult: Yes  Occupational Therapy Consult: Yes  Current Support Network: Lives with Spouse  Confirm Follow Up Transport: Family  The Plan for Transition of Care is Related to the Following Treatment Goals : Home health   The Patient and/or Patient Representative was Provided with a Choice of Provider and Agrees with the Discharge Plan?: Yes  Name of the Patient Representative Who was Provided with a Choice of Provider and Agrees with the Discharge Plan: Patient   Freedom of Choice List was Provided with Basic Dialogue that Supports the Patient's Individualized Plan of Care/Goals, Treatment Preferences and Shares the Quality Data Associated with the Providers?: Yes  Discharge Location  Discharge Placement: Home with home health(At home care)      Jefferson Aviles, [facility], [address] [place] [address]

## 2020-06-12 NOTE — PROGRESS NOTES
Problem: Mobility Impaired (Adult and Pediatric)  Goal: *Acute Goals and Plan of Care (Insert Text)  Description: FUNCTIONAL STATUS PRIOR TO ADMISSION: Patient was independent and active without use of DME. Denies history of falls. Still driving. Retired. Enjoys reading. HOME SUPPORT PRIOR TO ADMISSION: The patient lived with  but did not require assist.    Physical Therapy Goals  Initiated 6/11/2020    1. Patient will move from supine to sit and sit to supine , scoot up and down and roll side to side in bed with modified independence within 4 days. 2. Patient will perform sit to stand with modified independence within 4 days. 3. Patient will ambulate with modified independence for 300 feet with the least restrictive device within 4 days. 5. Patient will perform home exercise program per protocol with independence within 4 days. 6. Patient will demonstrate AROM 0-90 degrees in operative joint within 4 days. Outcome: Progressing Towards Goal   PHYSICAL THERAPY TREATMENT  Patient: Laura Kennedy (86 y.o. female)  Date: 6/12/2020  Diagnosis: Status post total knee replacement [Z96.659]   <principal problem not specified>  Procedure(s) (LRB):  LEFT TOTAL KNEE ARTHROPLASTY WITH NAVIGATION (Left) 1 Day Post-Op  Precautions: WBAT  Chart, physical therapy assessment, plan of care and goals were reviewed. ASSESSMENT  Patient continues with skilled PT services and is progressing towards goals, demonstrating modified independence w/ all aspects of functional mobility this date. Despite reports of 9/10 pain in L knee, pt tolerated therapy session well and mobilized at a modified independent level with use of rolling walker. Pt with antalgic, step-to gait pattern however able to correct with verbal cueing/education. Pt declined need to perform stair training, stating she had no steps to enter/exit home or within home.  At this time, pt is safe to discharge home w/ assist of  and HHPT.    Current Level of Function Impacting Discharge (mobility/balance): modified independence w/ use of rolling walker    Other factors to consider for discharge: pain levels         PLAN :  Patient continues to benefit from skilled intervention to address the above impairments. Continue treatment per established plan of care. to address goals. Recommendation for discharge: (in order for the patient to meet his/her long term goals)  Physical therapy at least 2 days/week in the home     This discharge recommendation:  Has been made in collaboration with the attending provider and/or case management    IF patient discharges home will need the following DME: patient owns DME required for discharge       SUBJECTIVE:   Patient stated It feels tight.     OBJECTIVE DATA SUMMARY:   Critical Behavior:  Neurologic State: Alert, Eyes open spontaneously  Orientation Level: Oriented X4  Cognition: Follows commands     Functional Mobility Training:  Bed Mobility:  Rolling: Independent  Supine to Sit: Independent     Scooting: Independent        Transfers:  Sit to Stand: Modified independent  Stand to Sit: Modified independent        Bed to Chair: Modified independent                    Balance:  Sitting: Intact  Standing: Intact; With support(RW)  Standing - Static: Good;Constant support  Standing - Dynamic : Good;Constant support  Ambulation/Gait Training:  Distance (ft): 200 Feet (ft)  Assistive Device: Walker, rolling;Gait belt  Ambulation - Level of Assistance: Modified independent        Gait Abnormalities: Antalgic; Step to gait              Speed/Marsha: Pace decreased (<100 feet/min)                            Therapeutic Exercises: Ankle pumps, heel slides, LAQ, knee flexion stretch    Pain Ratin/10 pain in knee    Activity Tolerance:   Good  Please refer to the flowsheet for vital signs taken during this treatment.     After treatment patient left in no apparent distress:   Sitting in chair and Call bell within reach    COMMUNICATION/COLLABORATION:   The patients plan of care was discussed with: Registered nurse, Case management, and NP .      Levi Brock, PT, DPT   Time Calculation: 29 mins

## 2022-03-18 PROBLEM — R19.7 DIARRHEA: Status: ACTIVE | Noted: 2018-11-03

## 2022-03-19 PROBLEM — Z96.659 STATUS POST TOTAL KNEE REPLACEMENT: Status: ACTIVE | Noted: 2020-06-11

## 2022-03-19 PROBLEM — K85.90 PANCREATITIS: Status: ACTIVE | Noted: 2018-10-26

## 2022-03-20 PROBLEM — B37.0 ORAL THRUSH: Status: ACTIVE | Noted: 2018-11-03

## 2023-05-11 RX ORDER — AMLODIPINE BESYLATE 5 MG/1
TABLET ORAL DAILY
COMMUNITY

## 2023-05-11 RX ORDER — FAMOTIDINE 20 MG/1
TABLET, FILM COATED ORAL 2 TIMES DAILY
COMMUNITY
Start: 2018-11-03

## 2023-05-11 RX ORDER — BIOTIN 1 MG
TABLET ORAL DAILY
COMMUNITY

## 2023-05-11 RX ORDER — FOLIC ACID 0.8 MG
500 TABLET ORAL DAILY
COMMUNITY

## 2023-05-11 RX ORDER — BUMETANIDE 0.5 MG/1
TABLET ORAL DAILY
COMMUNITY
Start: 2018-11-03

## 2023-05-11 RX ORDER — POTASSIUM CHLORIDE 750 MG/1
TABLET, FILM COATED, EXTENDED RELEASE ORAL 2 TIMES DAILY
COMMUNITY

## 2023-05-11 RX ORDER — FEXOFENADINE HCL AND PSEUDOEPHEDRINE HCI 180; 240 MG/1; MG/1
1 TABLET, EXTENDED RELEASE ORAL DAILY
COMMUNITY

## 2023-05-11 RX ORDER — CYCLOSPORINE 0.5 MG/ML
1 EMULSION OPHTHALMIC 2 TIMES DAILY
COMMUNITY

## (undated) DEVICE — REM POLYHESIVE ADULT PATIENT RETURN ELECTRODE: Brand: VALLEYLAB

## (undated) DEVICE — PREP KIT PEEL PTCH POVIDONE IOD

## (undated) DEVICE — INFECTION CONTROL KIT SYS

## (undated) DEVICE — SOLUTION IRRIG 3000ML 0.9% SOD CHL FLX CONT 0797208] ICU MEDICAL INC]

## (undated) DEVICE — DEVON™ KNEE AND BODY STRAP 60" X 3" (1.5 M X 7.6 CM): Brand: DEVON

## (undated) DEVICE — SURGICAL PROCEDURE KIT GEN LAPAROSCOPY LF

## (undated) DEVICE — UNIVERSAL FIXATION CANNULA: Brand: VERSAONE

## (undated) DEVICE — SMOKE EVACUATION PENCIL: Brand: VALLEYLAB

## (undated) DEVICE — STERILE POLYISOPRENE POWDER-FREE SURGICAL GLOVES WITH EMOLLIENT COATING: Brand: PROTEXIS

## (undated) DEVICE — STERILE POLYISOPRENE POWDER-FREE SURGICAL GLOVES: Brand: PROTEXIS

## (undated) DEVICE — SYSTEM SKIN CLSR 22CM DERMBND PRINEO

## (undated) DEVICE — PIN EXT FIX SCHNZ 3 MM

## (undated) DEVICE — SYR 50ML LR LCK 1ML GRAD NSAF --

## (undated) DEVICE — SUTURE VCRL SZ 0 L27IN ABSRB UD L36MM CT-1 1/2 CIR J260H

## (undated) DEVICE — CEMENT MIXING SYSTEM WITH FEMORAL BREAKWAY NOZZLE: Brand: REVOLUTION

## (undated) DEVICE — STRAP,POSITIONING,KNEE/BODY,FOAM,4X60": Brand: MEDLINE

## (undated) DEVICE — DRAPE,REIN 53X77,STERILE: Brand: MEDLINE

## (undated) DEVICE — Device

## (undated) DEVICE — PADDING CAST W6INXL4YD COT COHESIVE HND TEARABLE SPEC 100

## (undated) DEVICE — BAG SPEC RETRV 275ML 10ML DISPOSABLE RELIACATCH

## (undated) DEVICE — TUBESET BNE PREP CO2 CARBOJET

## (undated) DEVICE — SUTURE STRATAFIX SPRL SZ 1 L14IN ABSRB VLT L48CM CTX 1/2 SXPD2B405

## (undated) DEVICE — SUTURE ABSORBABLE MONOFILAMENT 2-0 WND CLOSURE GRN V-LOC 180 VLOCL0315

## (undated) DEVICE — 4-PORT MANIFOLD: Brand: NEPTUNE 2

## (undated) DEVICE — NEEDLE HYPO 22GA L1.5IN BLK S STL HUB POLYPR SHLD REG BVL

## (undated) DEVICE — HANDLE LT SNAP ON ULT DURABLE LENS FOR TRUMPF ALC DISPOSABLE

## (undated) DEVICE — NEEDLE HYPO 18GA L1.5IN PNK S STL HUB POLYPR SHLD REG BVL

## (undated) DEVICE — DRAPE XR C ARM 41X74IN LF --

## (undated) DEVICE — (D)PREP SKN CHLRAPRP APPL 26ML -- CONVERT TO ITEM 371833

## (undated) DEVICE — SOLUTION IV 500ML 0.9% SOD CHL FLX CONT

## (undated) DEVICE — SYR 20ML LL STRL LF --

## (undated) DEVICE — SOLUTION IRRIG 1000ML H2O STRL BLT

## (undated) DEVICE — BLADELESS OPTICAL TROCAR WITH FIXATION CANNULA: Brand: VERSAPORT

## (undated) DEVICE — GOWN,SIRUS,NONRNF,SETINSLV,2XL,18/CS: Brand: MEDLINE

## (undated) DEVICE — TRAP FLUID BUFFALO FLTR

## (undated) DEVICE — BANDAGE COMPR M W6INXL10YD WHT BGE VELC E MTRX HK AND LOOP

## (undated) DEVICE — Z DISCONTINUED USE 2717541 SUTURE STRATAFIX SZ 3-0 L30CM NONABSORBABLE UD L26MM FS 3/8

## (undated) DEVICE — SUTURE VCRL SZ 1 L27IN ABSRB VLT L36MM CT-1 1/2 CIR J341H

## (undated) DEVICE — APPLIER CLP M L L11.4IN DIA10MM ENDOSCP ROT MULT FOR LIG

## (undated) DEVICE — BLADELESS OPTICAL TROCAR WITH FIXATION CANNULA: Brand: VERSAONE

## (undated) DEVICE — ELECTRODE ES 36CM LAP FLAT L HK COAT DISP CLEANCOAT

## (undated) DEVICE — 1200 GUARD II KIT W/5MM TUBE W/O VAC TUBE: Brand: GUARDIAN

## (undated) DEVICE — APPLICATOR BNDG 1MM ADH PREMIERPRO EXOFIN

## (undated) DEVICE — 3M™ IOBAN™ 2 ANTIMICROBIAL INCISE DRAPE 6650EZ: Brand: IOBAN™ 2

## (undated) DEVICE — DEVICE TRNSF SPIK STL 2008S] MICROTEK MEDICAL INC]

## (undated) DEVICE — SYRINGE MED 20ML STD CLR PLAS LUERLOCK TIP N CTRL DISP

## (undated) DEVICE — PREP SKN CHLRAPRP APL 26ML STR --

## (undated) DEVICE — SUTURE MCRYL SZ 4-0 L27IN ABSRB UD L19MM PS-2 1/2 CIR PRIM Y426H

## (undated) DEVICE — ZIMMER® STERILE DISPOSABLE TOURNIQUET CUFF WITH PROTECTIVE SLEEVE AND PLC, DUAL PORT, SINGLE BLADDER, 34 IN. (86 CM)

## (undated) DEVICE — GARMENT,MEDLINE,DVT,INT,CALF,MED, GEN2: Brand: MEDLINE

## (undated) DEVICE — STRYKER PERFORMANCE SERIES SAGITTAL BLADE: Brand: STRYKER PERFORMANCE SERIES

## (undated) DEVICE — HANDPIECE SET WITH COAXIAL HIGH FLOW TIP AND SUCTION TUBE: Brand: INTERPULSE

## (undated) DEVICE — KENDALL SCD EXPRESS SLEEVES, KNEE LENGTH, MEDIUM: Brand: KENDALL SCD

## (undated) DEVICE — PACK SURG PROC KNEE USER GPS

## (undated) DEVICE — SUTURE SZ 0 27IN 5/8 CIR UR-6  TAPER PT VIOLET ABSRB VICRYL J603H

## (undated) DEVICE — SET CHOLANGIOGRAPHY 4FR L60CM W/ ARW KARLAN BLLN CATH CRV

## (undated) DEVICE — Z DISCONTINUED NO SUB IDED SET EXTN W/ 4 W STPCOCK M SPIN LOK 36IN